# Patient Record
Sex: FEMALE | Race: BLACK OR AFRICAN AMERICAN | NOT HISPANIC OR LATINO | Employment: UNEMPLOYED | URBAN - METROPOLITAN AREA
[De-identification: names, ages, dates, MRNs, and addresses within clinical notes are randomized per-mention and may not be internally consistent; named-entity substitution may affect disease eponyms.]

---

## 2017-01-04 ENCOUNTER — DOCUMENTATION (OUTPATIENT)
Dept: BARIATRICS/WEIGHT MGMT | Facility: HOSPITAL | Age: 37
End: 2017-01-04

## 2017-01-29 ENCOUNTER — APPOINTMENT (OUTPATIENT)
Dept: PREADMISSION TESTING | Facility: HOSPITAL | Age: 37
End: 2017-01-29

## 2017-01-29 LAB
DEPRECATED RDW RBC AUTO: 39.7 FL (ref 37–54)
ERYTHROCYTE [DISTWIDTH] IN BLOOD BY AUTOMATED COUNT: 12.8 % (ref 11.3–14.5)
HBA1C MFR BLD: 8.8 % (ref 4.8–5.6)
HCT VFR BLD AUTO: 43.9 % (ref 34.5–44)
HGB BLD-MCNC: 15.1 G/DL (ref 11.5–15.5)
MCH RBC QN AUTO: 29.4 PG (ref 27–31)
MCHC RBC AUTO-ENTMCNC: 34.4 G/DL (ref 32–36)
MCV RBC AUTO: 85.4 FL (ref 80–99)
PLATELET # BLD AUTO: 207 10*3/MM3 (ref 150–450)
PMV BLD AUTO: 9.9 FL (ref 6–12)
RBC # BLD AUTO: 5.14 10*6/MM3 (ref 3.89–5.14)
WBC NRBC COR # BLD: 6.52 10*3/MM3 (ref 3.5–10.8)

## 2017-01-29 PROCEDURE — 36415 COLL VENOUS BLD VENIPUNCTURE: CPT

## 2017-01-29 PROCEDURE — 93005 ELECTROCARDIOGRAM TRACING: CPT

## 2017-01-29 PROCEDURE — 93010 ELECTROCARDIOGRAM REPORT: CPT | Performed by: INTERNAL MEDICINE

## 2017-01-29 PROCEDURE — 83036 HEMOGLOBIN GLYCOSYLATED A1C: CPT | Performed by: ANESTHESIOLOGY

## 2017-01-29 PROCEDURE — 85027 COMPLETE CBC AUTOMATED: CPT | Performed by: ANESTHESIOLOGY

## 2017-01-29 RX ORDER — MELOXICAM 7.5 MG/1
7.5 TABLET ORAL AS NEEDED
COMMUNITY
Start: 2013-03-15 | End: 2018-07-21 | Stop reason: HOSPADM

## 2017-01-29 RX ORDER — TIZANIDINE 4 MG/1
TABLET ORAL
Refills: 5 | COMMUNITY
Start: 2016-11-16 | End: 2017-10-10

## 2017-01-29 RX ORDER — PRAVASTATIN SODIUM 40 MG
TABLET ORAL
Refills: 5 | COMMUNITY
Start: 2016-11-06 | End: 2017-01-29

## 2017-01-29 RX ORDER — FLUOXETINE HYDROCHLORIDE 20 MG/1
3 CAPSULE ORAL DAILY
COMMUNITY
Start: 2013-03-15 | End: 2017-01-29

## 2017-01-29 RX ORDER — IBUPROFEN 800 MG/1
800 TABLET ORAL AS NEEDED
COMMUNITY
Start: 2016-12-27 | End: 2018-07-21 | Stop reason: HOSPADM

## 2017-01-29 RX ORDER — HYDROXYZINE HYDROCHLORIDE 25 MG/1
TABLET, FILM COATED ORAL
COMMUNITY
Start: 2013-03-15 | End: 2017-01-29

## 2017-01-29 RX ORDER — BUSPIRONE HYDROCHLORIDE 15 MG/1
TABLET ORAL 2 TIMES DAILY
COMMUNITY
Start: 2013-03-15 | End: 2017-01-29

## 2017-01-29 RX ORDER — BLOOD-GLUCOSE METER
KIT MISCELLANEOUS
COMMUNITY
Start: 2016-12-17 | End: 2017-01-29

## 2017-01-29 RX ORDER — WARFARIN SODIUM 7.5 MG/1
TABLET ORAL
COMMUNITY
Start: 2013-03-15 | End: 2017-01-29

## 2017-01-29 RX ORDER — TRAZODONE HYDROCHLORIDE 50 MG/1
50 TABLET ORAL
COMMUNITY
Start: 2013-03-15 | End: 2017-01-29

## 2017-02-01 ENCOUNTER — ANESTHESIA EVENT (OUTPATIENT)
Dept: GASTROENTEROLOGY | Facility: HOSPITAL | Age: 37
End: 2017-02-01

## 2017-02-01 RX ORDER — FAMOTIDINE 20 MG/1
20 TABLET, FILM COATED ORAL ONCE
Status: CANCELLED | OUTPATIENT
Start: 2017-02-01 | End: 2017-02-01

## 2017-02-01 RX ORDER — LIDOCAINE HYDROCHLORIDE 10 MG/ML
1 INJECTION, SOLUTION EPIDURAL; INFILTRATION; INTRACAUDAL; PERINEURAL ONCE
Status: CANCELLED | OUTPATIENT
Start: 2017-02-01 | End: 2017-02-01

## 2017-02-01 RX ORDER — SODIUM CHLORIDE 0.9 % (FLUSH) 0.9 %
1-10 SYRINGE (ML) INJECTION AS NEEDED
Status: CANCELLED | OUTPATIENT
Start: 2017-02-01

## 2017-02-01 RX ORDER — SODIUM CHLORIDE, SODIUM LACTATE, POTASSIUM CHLORIDE, CALCIUM CHLORIDE 600; 310; 30; 20 MG/100ML; MG/100ML; MG/100ML; MG/100ML
9 INJECTION, SOLUTION INTRAVENOUS CONTINUOUS
Status: CANCELLED | OUTPATIENT
Start: 2017-02-01

## 2017-02-02 ENCOUNTER — HOSPITAL ENCOUNTER (OUTPATIENT)
Facility: HOSPITAL | Age: 37
Setting detail: HOSPITAL OUTPATIENT SURGERY
Discharge: HOME | End: 2017-02-02
Attending: SURGERY | Admitting: SURGERY

## 2017-02-02 ENCOUNTER — ANESTHESIA (OUTPATIENT)
Dept: GASTROENTEROLOGY | Facility: HOSPITAL | Age: 37
End: 2017-02-02

## 2017-02-02 VITALS
TEMPERATURE: 98 F | OXYGEN SATURATION: 98 % | SYSTOLIC BLOOD PRESSURE: 110 MMHG | HEART RATE: 93 BPM | DIASTOLIC BLOOD PRESSURE: 59 MMHG | RESPIRATION RATE: 18 BRPM

## 2017-02-02 DIAGNOSIS — K21.9 GASTROESOPHAGEAL REFLUX DISEASE, ESOPHAGITIS PRESENCE NOT SPECIFIED: ICD-10-CM

## 2017-02-02 LAB
B-HCG UR QL: NEGATIVE
GLUCOSE BLDC GLUCOMTR-MCNC: 230 MG/DL (ref 70–130)
GLUCOSE BLDC GLUCOMTR-MCNC: NORMAL MG/DL (ref 70–130)
INTERNAL NEGATIVE CONTROL: NORMAL
INTERNAL POSITIVE CONTROL: REACTIVE
Lab: NORMAL

## 2017-02-02 PROCEDURE — 88305 TISSUE EXAM BY PATHOLOGIST: CPT | Performed by: SURGERY

## 2017-02-02 PROCEDURE — 25010000002 PROPOFOL 10 MG/ML EMULSION: Performed by: NURSE ANESTHETIST, CERTIFIED REGISTERED

## 2017-02-02 PROCEDURE — 43239 EGD BIOPSY SINGLE/MULTIPLE: CPT | Performed by: SURGERY

## 2017-02-02 PROCEDURE — 82962 GLUCOSE BLOOD TEST: CPT

## 2017-02-02 RX ORDER — FAMOTIDINE 10 MG/ML
20 INJECTION, SOLUTION INTRAVENOUS ONCE
Status: COMPLETED | OUTPATIENT
Start: 2017-02-02 | End: 2017-02-02

## 2017-02-02 RX ORDER — NICOTINE 21 MG/24HR
1 PATCH, TRANSDERMAL 24 HOURS TRANSDERMAL EVERY 24 HOURS
COMMUNITY
End: 2018-04-06

## 2017-02-02 RX ORDER — SODIUM CHLORIDE 0.9 % (FLUSH) 0.9 %
1-10 SYRINGE (ML) INJECTION AS NEEDED
Status: DISCONTINUED | OUTPATIENT
Start: 2017-02-02 | End: 2017-02-02 | Stop reason: HOSPADM

## 2017-02-02 RX ORDER — PROPOFOL 10 MG/ML
VIAL (ML) INTRAVENOUS AS NEEDED
Status: DISCONTINUED | OUTPATIENT
Start: 2017-02-02 | End: 2017-02-02 | Stop reason: SURG

## 2017-02-02 RX ORDER — LIDOCAINE HYDROCHLORIDE 10 MG/ML
INJECTION, SOLUTION INFILTRATION; PERINEURAL AS NEEDED
Status: DISCONTINUED | OUTPATIENT
Start: 2017-02-02 | End: 2017-02-02 | Stop reason: SURG

## 2017-02-02 RX ORDER — SODIUM CHLORIDE 9 MG/ML
150 INJECTION, SOLUTION INTRAVENOUS CONTINUOUS
Status: DISCONTINUED | OUTPATIENT
Start: 2017-02-02 | End: 2017-02-02 | Stop reason: HOSPADM

## 2017-02-02 RX ADMIN — FAMOTIDINE 20 MG: 10 INJECTION, SOLUTION INTRAVENOUS at 08:12

## 2017-02-02 RX ADMIN — SODIUM CHLORIDE: 9 INJECTION, SOLUTION INTRAVENOUS at 10:17

## 2017-02-02 RX ADMIN — PROPOFOL 250 MG: 10 INJECTION, EMULSION INTRAVENOUS at 10:26

## 2017-02-02 RX ADMIN — LIDOCAINE HYDROCHLORIDE 100 MG: 10 INJECTION, SOLUTION INFILTRATION; PERINEURAL at 10:20

## 2017-02-02 RX ADMIN — SODIUM CHLORIDE 150 ML/HR: 9 INJECTION, SOLUTION INTRAVENOUS at 08:13

## 2017-02-02 NOTE — PLAN OF CARE
Problem: GI Endoscopy (Adult)  Goal: Signs and Symptoms of Listed Potential Problems Will be Absent or Manageable (GI Endoscopy)  Outcome: Outcome(s) achieved Date Met:  02/02/17

## 2017-02-02 NOTE — BRIEF OP NOTE
ESOPHAGOGASTRODUODENOSCOPY  Procedure Note    Carlos Heller  2/2/2017    Pre-op Diagnosis:   Gastroesophageal reflux disease, esophagitis presence not specified [K21.9]    Post-op Diagnosis:     Post-Op Diagnosis Codes:     * Gastroesophageal reflux disease, esophagitis presence not specified [K21.9]    Procedure/CPT® Codes:  CT EGD TRANSORAL BIOPSY SINGLE/MULTIPLE [82396]    Procedure(s):  ESOPHAGOGASTRODUODENOSCOPY    Surgeon(s):  Jean Cardozo MD    Anesthesia: Monitor Anesthesia Care    Staff:   Circulator: Windy Da Silva RN  Endo Technician: Arlene Jones  Endo Nurse: Latia Encarnacion RN    Estimated Blood Loss: * No values recorded between 2/2/2017 10:16 AM and 2/2/2017 10:25 AM *    Specimens:                  ID Type Source Tests Collected by Time Destination   A : Antrum bx. Tissue Stomach TISSUE EXAM Jean Cardozo MD 2/2/2017 1023    B : Dist. Esoph. Bx. Tissue Esophagus, Distal TISSUE EXAM Jean Cardozo MD 2/2/2017 1023          Drains:           Findings:       Complications: none      Jean Cardozo MD     Date: 2/2/2017  Time: 10:25 AM

## 2017-02-02 NOTE — H&P
Chief Complaint: GERD, pursuing LSG        History of Present Illness: Carlos Heller is a 36 y.o. female who presents today for evaluation, education and consultation regarding weight loss surgery. The patient is interested in sleeve gastrectomy       Carlos has been overweight for at least 15 years, has been 35 pounds or more overweight for at least 15 years, has been 100 pounds or more overweight for 15 or more years and started dieting at age teenager. The most weight Carlos lost was 30 pounds and maintained the weight loss for 2 months.      Unsupervised Diet Attempts: Cabbage Soup      Supervised Diet Attempts: None      Over-the-Counter or Prescribed Medications for Weight Loss: Wellbutrin, Dexatrim and Prozac             Medical History              Past Medical History   Diagnosis Date   • Carpal tunnel syndrome            on Neurontin   • Fatigue      • GERD (gastroesophageal reflux disease)      • Hx MRSA infection          on (L) leg, treated w/ I&D, IV abx.   • Hyperlipidemia      • Joint pain      • Morbid obesity      • Pulmonary embolism          unknown etiology, tx w/ coumadin   • Sleep apnea            suspected, not yet tested   • Tobacco use      • Type 2 diabetes mellitus            dx , follows w/ Endocrinology, no insulin             Surgical History                Past Surgical History   Procedure Laterality Date   • Abdominal hysterectomy             d/t postpartum bleeding   • Bilateral breast reduction       • Carpal tunnel release Left    •  section    ,    • Laparoscopic salpingoopherectomy             for tubal pregnancy   • Other surgical history               denies anesthesia issues           Allergies no known allergies      Current Outpatient Prescriptions:   • gabapentin (NEURONTIN) 300 MG capsule, Take 300 mg by mouth Daily., Disp: , Rfl:   • glipiZIDE (GLUCOTROL) 10 MG tablet, Take 10 mg by mouth 2 (Two) Times a Day Before  Meals., Disp: , Rfl:   • linagliptin (TRADJENTA) 5 MG tablet tablet, Take 5 mg by mouth Daily., Disp: , Rfl:   • omeprazole (priLOSEC) 20 MG capsule, Take 20 mg by mouth Daily., Disp: , Rfl:   • pravastatin (PRAVACHOL) 10 MG tablet, Take 10 mg by mouth Daily., Disp: , Rfl:     Social History    Social History                 Social History   • Marital status: Unknown         Spouse name: N/A   • Number of children: N/A   • Years of education: N/A               Occupational History   • Caregiver w/ [employer]                    Social History Main Topics   • Smoking status: Current Every Day Smoker         Packs/day: 0.50         Years: 20.00         Types: Cigarettes   • Smokeless tobacco: Never Used   • Alcohol use No   • Drug use: No   • Sexual activity: Not on file            Other Topics Concern   • Not on file             Social History Narrative      Lives in [place], [place] w/ 3 children.                    Family History   Problem Relation Age of Onset   • Diabetes Mother 35   • Hypertension Brother      • Sleep apnea Brother                  Review of Systems:  Constitutional: The patient reports fatigue, weight gain and denies fevers and chills.  Cardiovascular: The patient reports HLD and denies HTN, heart disease and DVT.  Respiratory: The patient reports apnea and PE.  Gastrointestinal: The patient reports heartburn and denies liver disease.  Genitourinary: The patient denies renal insufficiency.   Musculoskeletal: The patient reports joint pain and denies fibromyalgia and autoimmune disease.  Neurological: The patient denies seizure and stroke.  Psychiatric: The patient reports anxiety and denies depression and bipolar disorder.  Endocrine: The patient reports diabetes and denies thyroid disease.  Hematologic: The patient denies anemia and bleeding disorder.  Skin: The patient reports MRSA.      Physical Exam:  Vital Signs:  Weight: (!) 321 lb 8 oz (146 kg)   Body mass index is 50.35 kg/(m^2).  Temp:  99 °F (37.2 °C)   Heart Rate: 79   BP: 137/88       Physical Exam   Constitutional: She is oriented to person, place, and time. She appears well-developed and well-nourished.   HENT:   Head: Normocephalic and atraumatic.   Eyes: No scleral icterus.   Neck: Neck supple.   Cardiovascular: Normal rate and regular rhythm.   No murmur heard.  Pulmonary/Chest: Breath sounds normal. No respiratory distress. She has no wheezes. She has no rales.   Abdominal: Soft. Bowel sounds are normal. She exhibits no distension and no mass. There is no tenderness. No hernia.   Scars: Periumbilical, pfannenstiel   Musculoskeletal: Normal range of motion.   Neurological: She is alert and oriented to person, place, and time.   Skin: Skin is warm and dry.   Psychiatric: She has a normal mood and affect.   Vitals reviewed.                    Patient Active Problem List   Diagnosis   • Type 2 diabetes mellitus   • Tobacco use   • Sleep apnea   • Pulmonary embolism   • Morbid obesity   • Joint pain   • Hyperlipidemia   • Hx MRSA infection   • GERD (gastroesophageal reflux disease)   • Fatigue           Assessment:      Adolfomagaly Heller is a 36 y.o. year old female with medically complicated obesity pursuing sleeve gastrectomy.      Weight loss surgery is deemed medically necessary given the following obesity related comorbidities including sleep disturbance possible apnea, GERD, diabetes, hyperlipidemia, and joint pain with current Weight: (!) 321 lb 8 oz (146 kg) and Body mass index is 50.35 kg/(m^2)..          Plan:  EGD for further eval of GERD.          NORA Rangel for Dr. Cas CHIRINOS

## 2017-02-02 NOTE — OP NOTE
DATE OF PROCEDURE:  02/02/2017    PREOPERATIVE DIAGNOSIS:  Gastroesophageal reflux disease.      POSTOPERATIVE DIAGNOSIS: Gastroesophageal reflux disease.     PROCEDURE PERFORMED: Esophagogastroduodenoscopy with biopsy x two.     SURGEON: Jean Cardozo MD     ANESTHESIA: MAC.     SPECIMENS: Antrum and distal esophageal biopsies.     COMPLICATIONS: None.     FINDINGS: Fairly unremarkable exam. Z-line 40 cm. No obvious hiatal hernia.     INDICATIONS: This is a 36-year-old super morbidly obese black female interested in laparoscopic sleeve gastrectomy.  Both she and her  are well aware I do not offer sleeve gastrectomy to anyone who cannot be tobacco and secondhand smoke-free for at least 2 weeks preoperatively and 6 weeks postoperatively as the risk of leak is prohibitive. They voiced understanding and still wished to proceed. She has a long-standing history of severe GE reflux disease, not controlled with proton pump inhibitors. She also takes TUMS on a regular basis without much relief. No prior upper GI evaluation. No dysphagia. Please see our office notes. Risks, benefits and alternative therapies were discussed and she wished to proceed with diagnostic possible therapeutic EGD.     DESCRIPTION OF PROCEDURE:  The patient was brought to the endoscopy suite and placed supine upon the stretcher. A bite-block was placed. She was sedated by the anesthesiology staff and a standard flexible endoscope was advanced under direct visualization into the posterior pharynx. The vocal cords appeared normal. The esophagus was intubated. The endoscope advanced easily through the esophagus and no strictures, no retained secretions and no tertiary spasms. On reaching the distal esophagus, no visible hiatal hernia, Chaidez's esophagus or gross esophagitis. The gastric cavity was entered. The gastric mucosa appeared grossly normal. The pylorus was readily identified. It was not deformed. It was not in spasm. It was  easily intubated, and the endoscope advanced through the 1st and 2nd portions of the duodenum.  Duodenal mucosa was unremarkable. The endoscope was withdrawn to the antrum and a biopsy was obtained. There was some prepyloric antritis. The patient does take anti-inflammatories. Retroflexed examination revealed no significant abnormalities of the cardia, body or fundus. No visible hiatal hernia from the retroflex view. The endoscope was withdrawn to the Z line. It was 40 cm from the incisors. Photo documentation of the area and a biopsy above the Z line was obtained. The endoscope was slowly withdrawn. The remainder of examination showed no new abnormalities. The patient tolerated the procedure well without complication and was taken to the recovery room in stable condition.           MD LILLIAM Perez/leroy  DD: 02/02/2017 10:31:38  DT: 02/02/2017 12:28:52  Voice Rec. ID #[id number]  Voice Original ID #[id number]  Doc ID #[id number]  Rev. #0  cc:

## 2017-02-02 NOTE — ANESTHESIA POSTPROCEDURE EVALUATION
Patient: Carlos Heller    Procedure Summary     Date Anesthesia Start Anesthesia Stop Room / [facility]    02/02/17 1017 1032  [facility]       Procedure Diagnosis Surgeon Provider    ESOPHAGOGASTRODUODENOSCOPY (N/A Esophagus) Gastroesophageal reflux disease, esophagitis presence not specified  (Gastroesophageal reflux disease, esophagitis presence not specified [K21.9]) MD Anastacio Perez MD          Anesthesia Type: general  Last vitals  /59 (02/02/17 1030)    Temp 98 °F (36.7 °C) (02/02/17 1030)    Pulse 93 (02/02/17 1030)   Resp 18 (02/02/17 1030)    SpO2 98 % (02/02/17 1030)      Post Anesthesia Care and Evaluation    Patient location during evaluation: PACU  Patient participation: complete - patient participated  Level of consciousness: awake and alert  Pain score: 0  Pain management: adequate  Airway patency: patent  Anesthetic complications: No anesthetic complications  PONV Status: none  Cardiovascular status: hemodynamically stable and acceptable  Respiratory status: nonlabored ventilation, acceptable and nasal cannula  Hydration status: acceptable

## 2017-02-02 NOTE — ANESTHESIA PREPROCEDURE EVALUATION
Anesthesia Evaluation      Airway   Mallampati: I  TM distance: >3 FB  Neck ROM: full  no difficulty expected  Dental      Pulmonary    (+) pulmonary embolism, sleep apnea,   Cardiovascular     Rhythm: regular  Rate: normal    Neuro/Psych  GI/Hepatic/Renal/Endo    (+) morbid obesity, GERD, diabetes mellitus,     Musculoskeletal     Abdominal    Substance History      OB/GYN          Other                             Anesthesia Plan    ASA 3     general     intravenous induction   Anesthetic plan and risks discussed with patient.    Plan discussed with CRNA.

## 2017-02-03 LAB
CYTO UR: NORMAL
LAB AP CASE REPORT: NORMAL
LAB AP CLINICAL INFORMATION: NORMAL
Lab: NORMAL
PATH REPORT.FINAL DX SPEC: NORMAL
PATH REPORT.GROSS SPEC: NORMAL

## 2017-10-10 ENCOUNTER — OFFICE VISIT (OUTPATIENT)
Dept: RETAIL CLINIC | Facility: CLINIC | Age: 37
End: 2017-10-10

## 2017-10-10 VITALS — RESPIRATION RATE: 20 BRPM | TEMPERATURE: 98.1 F | HEART RATE: 78 BPM | OXYGEN SATURATION: 99 %

## 2017-10-10 DIAGNOSIS — M54.50 ACUTE LEFT-SIDED LOW BACK PAIN WITHOUT SCIATICA: ICD-10-CM

## 2017-10-10 DIAGNOSIS — S39.012A STRAIN OF LUMBAR REGION, INITIAL ENCOUNTER: Primary | ICD-10-CM

## 2017-10-10 LAB
BILIRUB BLD-MCNC: NEGATIVE MG/DL
CLARITY, POC: CLEAR
COLOR UR: YELLOW
GLUCOSE UR STRIP-MCNC: ABNORMAL MG/DL
KETONES UR QL: NEGATIVE
LEUKOCYTE EST, POC: NEGATIVE
NITRITE UR-MCNC: NEGATIVE MG/ML
PH UR: 5.5 [PH] (ref 5–8)
PROT UR STRIP-MCNC: NEGATIVE MG/DL
RBC # UR STRIP: NEGATIVE /UL
SP GR UR: 1.02 (ref 1–1.03)
UROBILINOGEN UR QL: NORMAL

## 2017-10-10 PROCEDURE — 99213 OFFICE O/P EST LOW 20 MIN: CPT | Performed by: NURSE PRACTITIONER

## 2017-10-10 PROCEDURE — 81003 URINALYSIS AUTO W/O SCOPE: CPT | Performed by: NURSE PRACTITIONER

## 2017-10-10 RX ORDER — CYCLOBENZAPRINE HCL 10 MG
10 TABLET ORAL 3 TIMES DAILY PRN
Qty: 20 TABLET | Refills: 0 | Status: SHIPPED | OUTPATIENT
Start: 2017-10-10 | End: 2018-04-06

## 2017-10-10 NOTE — PATIENT INSTRUCTIONS
Back Pain, Adult  Back pain is very common in adults. The cause of back pain is rarely dangerous and the pain often gets better over time. The cause of your back pain may not be known. Some common causes of back pain include:  · Strain of the muscles or ligaments supporting the spine.  · Wear and tear (degeneration) of the spinal disks.  · Arthritis.  · Direct injury to the back.  For many people, back pain may return. Since back pain is rarely dangerous, most people can learn to manage this condition on their own.  HOME CARE INSTRUCTIONS  Watch your back pain for any changes. The following actions may help to lessen any discomfort you are feeling:  · Remain active. It is stressful on your back to sit or  one place for long periods of time. Do not sit, drive, or  one place for more than 30 minutes at a time. Take short walks on even surfaces as soon as you are able. Try to increase the length of time you walk each day.  · Exercise regularly as directed by your health care provider. Exercise helps your back heal faster. It also helps avoid future injury by keeping your muscles strong and flexible.  · Do not stay in bed. Resting more than 1-2 days can delay your recovery.  · Pay attention to your body when you bend and lift. The most comfortable positions are those that put less stress on your recovering back. Always use proper lifting techniques, including:    Bending your knees.    Keeping the load close to your body.    Avoiding twisting.  · Find a comfortable position to sleep. Use a firm mattress and lie on your side with your knees slightly bent. If you lie on your back, put a pillow under your knees.  · Avoid feeling anxious or stressed. Stress increases muscle tension and can worsen back pain. It is important to recognize when you are anxious or stressed and learn ways to manage it, such as with exercise.  · Take medicines only as directed by your health care provider. Over-the-counter  medicines to reduce pain and inflammation are often the most helpful. Your health care provider may prescribe muscle relaxant drugs. These medicines help dull your pain so you can more quickly return to your normal activities and healthy exercise.  · Apply ice to the injured area:    Put ice in a plastic bag.    Place a towel between your skin and the bag.    Leave the ice on for 20 minutes, 2-3 times a day for the first 2-3 days. After that, ice and heat may be alternated to reduce pain and spasms.  · Maintain a healthy weight. Excess weight puts extra stress on your back and makes it difficult to maintain good posture.  SEEK MEDICAL CARE IF:  · You have pain that is not relieved with rest or medicine.  · You have increasing pain going down into the legs or buttocks.  · You have pain that does not improve in one week.  · You have night pain.  · You lose weight.  · You have a fever or chills.  SEEK IMMEDIATE MEDICAL CARE IF:   · You develop new bowel or bladder control problems.  · You have unusual weakness or numbness in your arms or legs.  · You develop nausea or vomiting.  · You develop abdominal pain.  · You feel faint.     This information is not intended to replace advice given to you by your health care provider. Make sure you discuss any questions you have with your health care provider.     Document Released: 12/18/2006 Document Revised: 01/08/2016 Document Reviewed: 04/21/2015  [employer] Interactive Patient Education ©2017 [employer]    Back Injury Prevention  Back injuries can be very painful. They can also be difficult to heal. After having one back injury, you are more likely to injure your back again. It is important to learn how to avoid injuring or re-injuring your back. The following tips can help you to prevent a back injury.  WHAT SHOULD I KNOW ABOUT PHYSICAL FITNESS?  · Exercise for 30 minutes per day on most days of the week or as directed by your health care provider. Make sure to:    Do  "aerobic exercises, such as walking, jogging, biking, or swimming.    Do exercises that increase balance and strength, such as lucille chi and yoga. These can decrease your risk of falling and injuring your back.    Do stretching exercises to help with flexibility.    Try to develop strong abdominal muscles. Your abdominal muscles provide a lot of the support that is needed by your back.  · Maintain a healthy weight.  This helps to decrease your risk of a back injury.  WHAT SHOULD I KNOW ABOUT MY DIET?  · Talk with your health care provider about your overall diet. Take supplements and vitamins only as directed by your health care provider.  · Talk with your health care provider about how much calcium and vitamin D you need each day. These nutrients help to prevent weakening of the bones (osteoporosis). Osteoporosis can cause broken (fractured) bones, which lead to back pain.  · Include good sources of calcium in your diet, such as dairy products, green leafy vegetables, and products that have had calcium added to them (fortified).  · Include good sources of vitamin D in your diet, such as milk and foods that are fortified with vitamin D.  WHAT SHOULD I KNOW ABOUT MY POSTURE?  · Sit up straight and stand up straight. Avoid leaning forward when you sit or hunching over when you stand.  · Choose chairs that have good low-back (lumbar) support.  · If you work at a desk, sit close to it so you do not need to lean over. Keep your chin tucked in. Keep your neck drawn back, and keep your elbows bent at a right angle. Your arms should look like the letter \"L.\"  · Sit high and close to the steering wheel when you drive. Add a lumbar support to your car seat, if needed.  · Avoid sitting or standing in one position for very long. Take breaks to get up, stretch, and walk around at least one time every hour. Take breaks every hour if you are driving for long periods of time.  · Sleep on your side with your knees slightly bent, or " sleep on your back with a pillow under your knees. Do not lie on the front of your body to sleep.  WHAT SHOULD I KNOW ABOUT LIFTING, TWISTING, AND REACHING?  Lifting and Heavy Lifting  · Avoid heavy lifting, especially repetitive heavy lifting. If you must do heavy lifting:    Stretch before lifting.    Work slowly.    Rest between lifts.    Use a tool such as a cart or a odessa to move objects if one is available.    Make several small trips instead of carrying one heavy load.    Ask for help when you need it, especially when moving big objects.  · Follow these steps when lifting:    Stand with your feet shoulder-width apart.    Get as close to the object as you can. Do not try to  a heavy object that is far from your body.    Use handles or lifting straps if they are available.    Bend at your knees. Squat down, but keep your heels off the floor.    Keep your shoulders pulled back, your chin tucked in, and your back straight.    Lift the object slowly while you tighten the muscles in your legs, abdomen, and buttocks. Keep the object as close to the center of your body as possible.  · Follow these steps when putting down a heavy load:    Stand with your feet shoulder-width apart.    Lower the object slowly while you tighten the muscles in your legs, abdomen, and buttocks. Keep the object as close to the center of your body as possible.    Keep your shoulders pulled back, your chin tucked in, and your back straight.    Bend at your knees. Squat down, but keep your heels off the floor.    Use handles or lifting straps if they are available.  Twisting and Reaching  · Avoid lifting heavy objects above your waist.  · Do not twist at your waist while you are lifting or carrying a load. If you need to turn, move your feet.  · Do not bend over without bending at your knees.  · Avoid reaching over your head, across a table, or for an object on a high surface.  WHAT ARE SOME OTHER TIPS?  · Avoid wet floors and icy  ground. Keep sidewalks clear of ice to prevent falls.  · Do not sleep on a mattress that is too soft or too hard.  · Keep items that are used frequently within easy reach.  · Put heavier objects on shelves at waist level, and put lighter objects on lower or higher shelves.  · Find ways to decrease your stress, such as exercise, massage, or relaxation techniques. Stress can build up in your muscles. Tense muscles are more vulnerable to injury.  · Talk with your health care provider if you feel anxious or depressed. These conditions can make back pain worse.  · Wear flat heel shoes with cushioned soles.  · Avoid sudden movements.  · Use both shoulder straps when carrying a backpack.  · Do not use any tobacco products, including cigarettes, chewing tobacco, or electronic cigarettes. If you need help quitting, ask your health care provider.     This information is not intended to replace advice given to you by your health care provider. Make sure you discuss any questions you have with your health care provider.     Document Released: 01/25/2006 Document Revised: 04/10/2017 Document Reviewed: 12/22/2015  [employer] Patient Education ©2017 [employer]      Continue Ibuprofen every 8 hours.  You may alternate with tylenol in between if needed.  Add flexeril.  Caution due to risk of drowsiness. Follow up with PCP if symptoms persist or worsen.  If you develop other symptoms such as fever,increased pain, blood in urine, etc - you should go to ER.

## 2017-10-10 NOTE — PROGRESS NOTES
"Rodrigo Heller is a 37 y.o. female.   Chief Complaint   Patient presents with   • Back Pain      HPI Comments: Spent the weekend moving heavy furniture/beds, cleaning, etc.   Then 2-3 days ago started having left lower back pain.  Pain worse with movement (worse with bending and twisting of trunk, position change from sitting to laying).   Taking Ibuprofen 800mg every 6-8 hours and does relieve pain.  Pain does not radiate into hip or legs.   Very localized.  Has not had any urinary symptoms, but was worried about possible UTI.   Has hx of recurrent \"skin boils\", and PCP has given her rx for bactrim to have on hand.  Started having skin flare up 4 days ago on legs (better now), so she started bactrim.   No fever or other symptoms.     Back Pain   Pertinent negatives include no abdominal pain, chest pain, dysuria, fever or pelvic pain.        The following portions of the patient's history were reviewed and updated as appropriate: allergies, current medications, past family history, past medical history, past social history, past surgical history and problem list.    Current Outpatient Prescriptions:   •  cyclobenzaprine (FLEXERIL) 10 MG tablet, Take 1 tablet by mouth 3 (Three) Times a Day As Needed for Muscle Spasms., Disp: 20 tablet, Rfl: 0  •  gabapentin (NEURONTIN) 300 MG capsule, Take 600 mg by mouth As Needed (muscle pain)., Disp: , Rfl:   •  ibuprofen (ADVIL,MOTRIN) 800 MG tablet, Take 800 mg by mouth As Needed for mild pain (1-3)., Disp: , Rfl:   •  linagliptin (TRADJENTA) 5 MG tablet tablet, Take 5 mg by mouth Daily., Disp: , Rfl:   •  meloxicam (MOBIC) 7.5 MG tablet, Take 7.5 mg by mouth As Needed., Disp: , Rfl:   •  nicotine (NICODERM CQ) 21 MG/24HR patch, Place 1 patch on the skin Daily., Disp: , Rfl:   •  omeprazole (priLOSEC) 20 MG capsule, Take 20 mg by mouth 2 (Two) Times a Day As Needed (heartburn)., Disp: , Rfl:   •  pravastatin (PRAVACHOL) 10 MG tablet, Take 80 mg by mouth " Daily., Disp: , Rfl:     Review of Systems   Constitutional: Negative for appetite change, chills, fatigue and fever.   HENT: Negative for congestion and sore throat.    Respiratory: Negative for cough, chest tightness and shortness of breath.    Cardiovascular: Negative for chest pain.   Gastrointestinal: Negative for abdominal distention, abdominal pain, constipation, diarrhea, nausea and vomiting.   Endocrine: Negative for polydipsia, polyphagia and polyuria.   Genitourinary: Negative for decreased urine volume, difficulty urinating, dysuria, flank pain, frequency, genital sores, hematuria, pelvic pain, urgency, vaginal bleeding, vaginal discharge and vaginal pain.   Musculoskeletal: Positive for back pain. Negative for gait problem.   Skin: Negative for rash.   Neurological: Negative for dizziness.     Pulse 78  Temp 98.1 °F (36.7 °C)  Resp 20  SpO2 99%    Objective   Allergies   Allergen Reactions   • Influenza Vaccines GI Intolerance       Physical Exam   Constitutional: Vital signs are normal. She is cooperative. She does not appear ill. No distress.   HENT:   Mouth/Throat: Uvula is midline, oropharynx is clear and moist and mucous membranes are normal.   Eyes: Conjunctivae are normal.   Neck: Full passive range of motion without pain.   Cardiovascular: Regular rhythm.    Pulmonary/Chest: Effort normal and breath sounds normal.   Abdominal: Normal appearance and bowel sounds are normal. There is no tenderness.   Musculoskeletal:        Arms:  No CVA tenderness    Also, no current tenderness on exam.  Full ROM.  But, pt reports has pain in indicated area when it is time for Ibuprofen again   Neurological: She is alert.       Assessment/Plan   Carlos was seen today for back pain.    Diagnoses and all orders for this visit:    Strain of lumbar region, initial encounter    Acute left-sided low back pain without sciatica  -     POCT urinalysis dipstick, automated    Other orders  -     cyclobenzaprine  (FLEXERIL) 10 MG tablet; Take 1 tablet by mouth 3 (Three) Times a Day As Needed for Muscle Spasms.

## 2018-03-15 ENCOUNTER — DOCUMENTATION (OUTPATIENT)
Dept: BARIATRICS/WEIGHT MGMT | Facility: CLINIC | Age: 38
End: 2018-03-15

## 2018-03-15 DIAGNOSIS — E11.69 DIABETES MELLITUS TYPE 2 IN OBESE (HCC): ICD-10-CM

## 2018-03-15 DIAGNOSIS — R06.00 DYSPNEA, UNSPECIFIED TYPE: Primary | ICD-10-CM

## 2018-03-15 DIAGNOSIS — R10.13 DYSPEPSIA: ICD-10-CM

## 2018-03-15 DIAGNOSIS — R53.83 FATIGUE, UNSPECIFIED TYPE: ICD-10-CM

## 2018-03-15 DIAGNOSIS — E66.9 DIABETES MELLITUS TYPE 2 IN OBESE (HCC): ICD-10-CM

## 2018-04-05 DIAGNOSIS — E66.9 DIABETES MELLITUS TYPE 2 IN OBESE (HCC): ICD-10-CM

## 2018-04-05 DIAGNOSIS — R10.13 DYSPEPSIA: ICD-10-CM

## 2018-04-05 DIAGNOSIS — R06.00 DYSPNEA, UNSPECIFIED TYPE: ICD-10-CM

## 2018-04-05 DIAGNOSIS — E11.69 DIABETES MELLITUS TYPE 2 IN OBESE (HCC): ICD-10-CM

## 2018-04-05 DIAGNOSIS — R53.83 FATIGUE, UNSPECIFIED TYPE: ICD-10-CM

## 2018-04-06 ENCOUNTER — OFFICE VISIT (OUTPATIENT)
Dept: BARIATRICS/WEIGHT MGMT | Facility: CLINIC | Age: 38
End: 2018-04-06

## 2018-04-06 ENCOUNTER — DOCUMENTATION (OUTPATIENT)
Dept: BARIATRICS/WEIGHT MGMT | Facility: CLINIC | Age: 38
End: 2018-04-06

## 2018-04-06 VITALS
DIASTOLIC BLOOD PRESSURE: 86 MMHG | RESPIRATION RATE: 18 BRPM | WEIGHT: 293 LBS | HEIGHT: 65 IN | HEART RATE: 67 BPM | BODY MASS INDEX: 48.82 KG/M2 | TEMPERATURE: 97.8 F | SYSTOLIC BLOOD PRESSURE: 146 MMHG | OXYGEN SATURATION: 99 %

## 2018-04-06 DIAGNOSIS — M79.7 FIBROMYALGIA: ICD-10-CM

## 2018-04-06 DIAGNOSIS — Z87.891 FORMER SMOKER: ICD-10-CM

## 2018-04-06 DIAGNOSIS — E78.5 HYPERLIPIDEMIA, UNSPECIFIED HYPERLIPIDEMIA TYPE: Primary | ICD-10-CM

## 2018-04-06 DIAGNOSIS — E11.8 TYPE 2 DIABETES MELLITUS WITH COMPLICATION, WITHOUT LONG-TERM CURRENT USE OF INSULIN (HCC): ICD-10-CM

## 2018-04-06 DIAGNOSIS — G56.00 CARPAL TUNNEL SYNDROME, UNSPECIFIED LATERALITY: ICD-10-CM

## 2018-04-06 DIAGNOSIS — F32.A DEPRESSION, UNSPECIFIED DEPRESSION TYPE: ICD-10-CM

## 2018-04-06 DIAGNOSIS — E66.01 MORBID OBESITY (HCC): ICD-10-CM

## 2018-04-06 DIAGNOSIS — G47.33 OSA (OBSTRUCTIVE SLEEP APNEA): ICD-10-CM

## 2018-04-06 DIAGNOSIS — G47.30 SLEEP APNEA, UNSPECIFIED TYPE: ICD-10-CM

## 2018-04-06 DIAGNOSIS — I26.99 OTHER PULMONARY EMBOLISM WITHOUT ACUTE COR PULMONALE, UNSPECIFIED CHRONICITY (HCC): ICD-10-CM

## 2018-04-06 DIAGNOSIS — K21.9 GASTROESOPHAGEAL REFLUX DISEASE, ESOPHAGITIS PRESENCE NOT SPECIFIED: ICD-10-CM

## 2018-04-06 DIAGNOSIS — M25.50 ARTHRALGIA, UNSPECIFIED JOINT: ICD-10-CM

## 2018-04-06 DIAGNOSIS — R53.83 OTHER FATIGUE: ICD-10-CM

## 2018-04-06 PROCEDURE — 99214 OFFICE O/P EST MOD 30 MIN: CPT | Performed by: PHYSICIAN ASSISTANT

## 2018-04-06 RX ORDER — INSULIN GLARGINE 100 [IU]/ML
INJECTION, SOLUTION SUBCUTANEOUS
Refills: 2 | COMMUNITY
Start: 2018-03-16 | End: 2018-07-21 | Stop reason: HOSPADM

## 2018-04-06 RX ORDER — DICLOFENAC SODIUM 75 MG/1
75 TABLET, DELAYED RELEASE ORAL 2 TIMES DAILY
Refills: 1 | COMMUNITY
Start: 2018-02-26 | End: 2018-06-19

## 2018-04-06 RX ORDER — DULOXETIN HYDROCHLORIDE 30 MG/1
30 CAPSULE, DELAYED RELEASE ORAL DAILY
COMMUNITY
Start: 2018-04-04 | End: 2021-08-05

## 2018-04-06 RX ORDER — ERGOCALCIFEROL 1.25 MG/1
CAPSULE ORAL
Refills: 1 | COMMUNITY
Start: 2018-03-20 | End: 2021-08-05

## 2018-04-06 NOTE — PROGRESS NOTES
[facility]  [address]  [facility] [address] [phone]      Patient  Name:  Carlos Heller  :  1980      Date of Visit: 2018      Chief Complaint:  weight gain; unable to maintain weight loss    History of Present Illness:  Carlos Heller is a 37 y.o. female who presents today for evaluation, education and consultation regarding weight loss surgery. The patient is interested in sleeve gastrectomy.     Carlos has been overweight for at least 10 years, has been 35 pounds or more overweight for at least 15 years, has been 100 pounds or more overweight for 15 or more years and started dieting at age 20.      Previous diet attempts include: Low Fat, Cabbage Soup and Slim Fast; None; Wellbutrin.  The most weight Carlos lost was 50 pounds with stopped drinking pop but was only able to maintain that weight loss for 2 months.  Her maximum lifetime weight is 350 pounds.    As above, patient has been overweight for many years, with numerous failed dietary/weight loss attempts.  She now has obesity related comorbidities and as such has decided to pursue weight loss surgery. Pursuing WLS to live a healthier life.  Her cousin has also had WLS with Dr. Cardozo.     Patient has h/o PE in , unknown etiology.   Hospitalized x one week. Treated with warfarin x 6 months. Did not see hematology.  No anticoagulation currently. No DVT since.      She does have chronic joint pain which she feels is related to obesity. Has had corticosteroid injections of knee, last 3/2018. Also with carpal tunnel, steroid injection of hand 2018. Taking daily NSAIDS.  Ibuprofen, Voltaren and mobic all on med list, patient is a little uncertain which she is taking, mobic seemingly a new Rx.     GI history of GERD, not fully controlled on BID PPI, does take tums prn. Patient had EGD apart of bariatric workup 2017 fairly unremarkable. No findings of HH.   Otherwise, no GI complaints of nausea, vomiting, abd pain, dysphagia, bowel issues. Still has GB.     She is a former smoker. 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 She has a couple client's currently smoking.     All past medical, surgical, social and family history have been obtained and discussed as pertinent to bariatric surgery as below.     Past Medical History:   Diagnosis Date   • Carpal tunnel syndrome     NSAIDS daily, also gets steroid injection of right hand, last2018   • Depression    • Fatigue    • Fibromyalgia     patient thinks related to obesity   • Former smoker    • GERD (gastroesophageal reflux disease)     omeprazole BID and tums prn.  EGD with Dr. Cardozo.  No h/o HH or h pylori.    • Hx MRSA infection     on (L) leg, treated w/ I&D, IV abx.      • Hyperlipidemia    • Joint pain     back, shoulder, hips. Recented Rx mobic. Steroid Injection of  L knee 3/2018   • Morbid obesity    • ADDI (obstructive sleep apnea)     semi compliant with CPAP   • Pulmonary embolism     unknown etiology, tx w/ coumadin   • Type 2 diabetes mellitus     dx , follows w/ Endocrinology, on insulin since  checks sugar once a week   • Wears glasses      Past Surgical History:   Procedure Laterality Date   • ABDOMINAL HYSTERECTOMY      d/t postpartum bleeding  with ,  partial    • BILATERAL BREAST REDUCTION     • CARPAL TUNNEL RELEASE Left    •  SECTION  , 2009    x2, horizontal   • ENDOSCOPY N/A 2017    Procedure: ESOPHAGOGASTRODUODENOSCOPY;  Surgeon: Jean Cardozo MD;  [facility]: [facility] ENDOSCOPY;  Service:    • LAPAROSCOPIC SALPINGOOPHERECTOMY      for tubal pregnancy   • OTHER SURGICAL HISTORY      denies anesthesia issues   • WISDOM TOOTH EXTRACTION         Allergies   Allergen Reactions   • Influenza Vaccines GI Intolerance   • Wellbutrin [Bupropion] Palpitations       Current Outpatient Prescriptions:    •  DULoxetine (CYMBALTA) 30 MG capsule, , Disp: , Rfl:   •  ibuprofen (ADVIL,MOTRIN) 800 MG tablet, Take 800 mg by mouth As Needed for mild pain (1-3)., Disp: , Rfl:   •  Insulin Glulisine (APIDRA IJ), Inject  as directed., Disp: , Rfl:   •  LANTUS SOLOSTAR 100 UNIT/ML injection pen, INJECT 40 UNITS EVERY A.M. AND 40 UNITS EVERY BEDTIME, Disp: , Rfl: 2  •  meloxicam (MOBIC) 7.5 MG tablet, Take 7.5 mg by mouth As Needed., Disp: , Rfl:   •  omeprazole (priLOSEC) 20 MG capsule, Take 20 mg by mouth 2 (Two) Times a Day As Needed (heartburn)., Disp: , Rfl:   •  pravastatin (PRAVACHOL) 10 MG tablet, Take 80 mg by mouth Daily., Disp: , Rfl:   •  vitamin D (ERGOCALCIFEROL) 65429 units capsule capsule, TAKE 1 CAPSULE(S) EVERY WEEK BY ORAL ROUTE FOR 30 DAYS., Disp: , Rfl: 1  •  diclofenac (VOLTAREN) 75 MG EC tablet, Take 75 mg by mouth 2 (Two) Times a Day., Disp: , Rfl: 1    Social History     Social History   • Marital status: Single     Spouse name: N/A   • Number of children: 3   • Years of education: GED     Occupational History   • Caregiver w/ [employer]      Social History Main Topics   • Smoking status: Former Smoker     Packs/day: 0.50     Years: 20.00     Types: Cigarettes     Quit date: 01/2017   • Smokeless tobacco: Never Used   • Alcohol use No   • Drug use: No   • Sexual activity: Not on file      Comment: no hormones     Other Topics Concern   • Not on file     Social History Narrative    Lives in [place], [place] w/ 3 children.  Works as caregiver.      Family History   Problem Relation Age of Onset   • Diabetes Mother 35   • Hypertension Brother    • Sleep apnea Brother    • No Known Problems Father    • No Known Problems Sister    • No Known Problems Maternal Grandmother    • No Known Problems Maternal Grandfather    • No Known Problems Paternal Grandmother    • No Known Problems Paternal Grandfather        Review of Systems:  Constitutional:  The patient reports fatigue, weight gain and denies fevers and  chills.  Cardiovascular:  The patient reports HLD, DVT and denies HTN, CP, MI, heart disease and edema.  Respiratory:  The patient reports apnea, PE and denies asthma.  Gastrointestinal:  The patient reports heartburn and denies pancreatitis, liver disease and IBS.  Genitourinary:  The patient denies renal insufficiency.    Musculoskeletal:  The patient reports joint pain, back pain, fibromyalgia, arthritis and denies autoimmune disease.  Neurological:  The patient reports none and denies seizure and stroke.  Psychiatric:  The patient reports depression and denies anxiety and bipolar disorder.  Endocrine:  The patient reports diabetes and denies thyroid disease and gout.  Hematologic:  The patient reports none and denies anemia and bleeding disorder.  Skin:  The patient reports MRSA.    Physical Exam:  Vital Signs:  Weight: (!) 152 kg (335 lb 0.2 oz)   Body mass index is 56.62 kg/m².  Temp: 97.8 °F (36.6 °C)   Heart Rate: 67   BP: 146/86     Physical Exam   Constitutional: She is oriented to person, place, and time. She appears well-developed and well-nourished.   HENT:   Head: Normocephalic and atraumatic.   Mouth/Throat: Oropharynx is clear and moist.   Eyes: EOM are normal.   Neck: Normal range of motion. Neck supple. No thyromegaly present.   Cardiovascular: Normal rate, regular rhythm and normal heart sounds.    Pulmonary/Chest: Effort normal and breath sounds normal. No respiratory distress. She has no wheezes.   Abdominal: Soft. Bowel sounds are normal. She exhibits no distension. There is no tenderness.   Lower lap scars.  Pfannenstiel.    Musculoskeletal: Normal range of motion.   Neurological: She is alert and oriented to person, place, and time.   Skin: Skin is warm and dry.   Psychiatric: She has a normal mood and affect. Her behavior is normal. Judgment and thought content normal.   Vitals reviewed.      Patient Active Problem List   Diagnosis   • Type 2 diabetes mellitus   • Sleep apnea   • Morbid  obesity   • Joint pain   • Hyperlipidemia   • Hx MRSA infection   • GERD (gastroesophageal reflux disease)   • Fatigue   • Former smoker   • Carpal tunnel syndrome   • ADDI (obstructive sleep apnea)   • Pulmonary embolism   • Depression   • Fibromyalgia   • Wears glasses       Assessment:    Carlos Heller is a 37 y.o. year old female with medically complicated obesity pursuing sleeve gastrectomy.    Weight loss surgery is deemed medically necessary given the following obesity related comorbidities including sleep apnea, diabetes, dyslipidemia, osteoarthritis, back pain, knee pain, fibromyalgia, GERD and depression with current Weight: (!) 152 kg (335 lb 0.2 oz) and Body mass index is 56.62 kg/m²..    Plan:  The consultation plan and program requirements were reviewed with the patient.  The patient has been advised that a letter of medical support must be obtained from her primary care physician or referring provider. A psychological evaluation will be arranged.  A nutritional evaluation will be performed.  The patient was advised to start a high protein and low carbohydrate diet.  Necessary lifestyle modifications were discussed.  Instructions on how to access WOODROW was given to the patient.  WOODROW is an internet based educational video that explains the surgical procedure chosen and answers basic questions regarding that procedure.     Preoperative testing will include: CBC, CMP, Fasting Lipids, TSH, HgA1C, H.Pylori, Pulmonary Function Testing, CXR, EKG and EGD. EGD was completed 2/2017 by Dr. Cardozo.    Additional preop clearances required prior to surgery: Cardiac.  Patient already has cardiac clearance.     The patient has been educated on expected postoperative lifestyle changes, including commitment to high protein diet, vitamin regimen, and exercise program.  They are aware that support groups are encouraged for optimal weight loss results. Patient understands that bariatric surgery is not cosmetic  surgery but rather a tool to help make a lifelong commitment to lifestyle changes including diet, exercise, behavior modifications, and healthy habits. The procedure was discussed with the patient and all questions were answered. The importance of avoiding ASA/ NSAIDS/ steroids/ tobacco/ hormones/ immunomodulators perioperatively was discussed. 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         Madeline Adamson PA-C

## 2018-04-06 NOTE — PROGRESS NOTES
"Weight Loss Surgery  Presurgical Nutrition Assessment     Carlos Heller  2018  [id number]  [id number]  1980  female    Surgery desired: Sleeve Gastrectomy    Ht 163.8 cm (64.5 \"); Wt 152 kg (335 #); BMI 56.6  Past Medical History:   Diagnosis Date   • Carpal tunnel syndrome     NSAIDS daily, also gets steroid injection of right hand, last2018   • Depression    • Fatigue    • Fibromyalgia     patient thinks related to obesity   • Former smoker    • GERD (gastroesophageal reflux disease)     omeprazole BID and tums prn.  EGD with Dr. Cardozo.  No h/o HH or h pylori.    • Hx MRSA infection     on (L) leg, treated w/ I&D, IV abx.      • Hyperlipidemia    • Joint pain     back, shoulder, hips. Recented Rx mobic. Steroid Injection of  L knee 3/2018   • Morbid obesity    • ADDI (obstructive sleep apnea)     semi compliant with CPAP   • Pulmonary embolism     unknown etiology, tx w/ coumadin   • Type 2 diabetes mellitus     dx , follows w/ Endocrinology, on insulin since  checks sugar once a week   • Wears glasses      Past Surgical History:   Procedure Laterality Date   • ABDOMINAL HYSTERECTOMY      d/t postpartum bleeding  with ,  partial    • BILATERAL BREAST REDUCTION     • CARPAL TUNNEL RELEASE Left    •  SECTION  , 2009    x2, horizontal   • ENDOSCOPY N/A 2017    Procedure: ESOPHAGOGASTRODUODENOSCOPY;  Surgeon: Jean Cardozo MD;  [facility]: [facility] ENDOSCOPY;  Service:    • LAPAROSCOPIC SALPINGOOPHERECTOMY      for tubal pregnancy   • OTHER SURGICAL HISTORY      denies anesthesia issues   • WISDOM TOOTH EXTRACTION       Allergies   Allergen Reactions   • Influenza Vaccines GI Intolerance   • Wellbutrin [Bupropion] Palpitations       Current Outpatient Prescriptions:   •  diclofenac (VOLTAREN) 75 MG EC tablet, Take 75 mg by mouth 2 (Two) Times a Day., Disp: , Rfl: 1  •  DULoxetine (CYMBALTA) 30 MG capsule, , Disp: , Rfl:   •  ibuprofen " (ADVIL,MOTRIN) 800 MG tablet, Take 800 mg by mouth As Needed for mild pain (1-3)., Disp: , Rfl:   •  Insulin Glulisine (APIDRA IJ), Inject  as directed., Disp: , Rfl:   •  LANTUS SOLOSTAR 100 UNIT/ML injection pen, INJECT 40 UNITS EVERY A.M. AND 40 UNITS EVERY BEDTIME, Disp: , Rfl: 2  •  meloxicam (MOBIC) 7.5 MG tablet, Take 7.5 mg by mouth As Needed., Disp: , Rfl:   •  omeprazole (priLOSEC) 20 MG capsule, Take 20 mg by mouth 2 (Two) Times a Day As Needed (heartburn)., Disp: , Rfl:   •  pravastatin (PRAVACHOL) 10 MG tablet, Take 80 mg by mouth Daily., Disp: , Rfl:   •  vitamin D (ERGOCALCIFEROL) 30897 units capsule capsule, TAKE 1 CAPSULE(S) EVERY WEEK BY ORAL ROUTE FOR 30 DAYS., Disp: , Rfl: 1      Nutrition Assessment    Estimated energy needs:  2210 kcal    Estimated calories for weight loss:  1500 kcal    IBW (Pounds):  145 #        Excess body weight (Pounds):  185 #       Nutrition Recall  24 Hour recall: (B) (L) (D) -  Reviewed and discussed with patient.  Usually has pkg of 6 cheese crax /c a banana for brkfast and 1 cup coffee /c mocha cream.  Excessive intake of processed food:  Hot dog /c bun & 1 slice personal pan pepperoni pizza) at noon for lunch; 3 pm Ham [place] & chips; 8 pm chicken sydnee pasta salad /c Ranch dressing.Four mo ago gave up drinking 3-20 oz bottles of Pepsi, but has substituted Hawaiian punch.         Exercise  never      Education    Provided information packet re:  Sleeve Gastrectomy  1. Reviewed guidelines for higher protein, limited carbohydrate diet to promote weight loss.  Encouraged patient to incorporate these principles of healthy eating from now until approximately 2 weeks prior to bariatric surgery date, when an even lower carbohydrate “liver-shrinking” regimen will be followed. (Information sheet re pre-op diet given).  Explained that after recovery from surgery this diet will again be followed to ensure further loss and for weight maintenance.    2. Encouraged patient  to choose an acceptable protein supplement powder or shake for post-surgery liquid diet.  Provided product guidelines and examples.    3. Explained importance of goal setting to help in changing eating behaviors that are not conducive to weight loss.  Targeted several on a worksheet which also included spaces for patient to work on issues specific to them.  4. Provided follow-up options for support, including contact information for dietitians here, if desired.  Web-based support information and apps for smart phones and computers given.  Noted that monthly support group is offered at this clinic, and that support is associated with successful weight loss.    Recommend that team proceed with surgery and follow per protocol.      Nutrition Goals   Dietary Guidelines per information packet as described above  Protein goal:  grams per day   Carbohydrate goal:  100-140 grams per day  Eliminate soda, sweet tea, etc.     Exercise Goals  Continue current exercise routine   Add 15-30 minutes of activity per day as tolerated      Corie Espinoza, [place]  04/06/2018  3:22 PM

## 2018-04-09 LAB
ALBUMIN SERPL-MCNC: 4.3 G/DL (ref 3.2–4.8)
ALBUMIN/GLOB SERPL: 1.7 G/DL (ref 1.5–2.5)
ALP SERPL-CCNC: 55 U/L (ref 25–100)
ALT SERPL-CCNC: 39 U/L (ref 7–40)
AST SERPL-CCNC: 26 U/L (ref 0–33)
BILIRUB SERPL-MCNC: 1 MG/DL (ref 0.3–1.2)
BUN SERPL-MCNC: 10 MG/DL (ref 9–23)
BUN/CREAT SERPL: 11.1 (ref 7–25)
CALCIUM SERPL-MCNC: 9.5 MG/DL (ref 8.7–10.4)
CHLORIDE SERPL-SCNC: 105 MMOL/L (ref 99–109)
CHOLEST SERPL-MCNC: 193 MG/DL (ref 0–200)
CO2 SERPL-SCNC: 27 MMOL/L (ref 20–31)
CREAT SERPL-MCNC: 0.9 MG/DL (ref 0.6–1.3)
ERYTHROCYTE [DISTWIDTH] IN BLOOD BY AUTOMATED COUNT: 13.4 % (ref 11.3–14.5)
GFR SERPLBLD CREATININE-BSD FMLA CKD-EPI: 71 ML/MIN/1.73
GFR SERPLBLD CREATININE-BSD FMLA CKD-EPI: 86 ML/MIN/1.73
GLOBULIN SER CALC-MCNC: 2.6 GM/DL
GLUCOSE SERPL-MCNC: 145 MG/DL (ref 70–100)
H PYLORI IGA SER-ACNC: <9 UNITS (ref 0–8.9)
H PYLORI IGG SER IA-ACNC: 0.33 INDEX VALUE (ref 0–0.79)
H PYLORI IGM SER-ACNC: <9 UNITS (ref 0–8.9)
HBA1C MFR BLD: 7.8 % (ref 4.8–5.6)
HCT VFR BLD AUTO: 43.8 % (ref 34.5–44)
HDLC SERPL-MCNC: 44 MG/DL (ref 40–60)
HGB BLD-MCNC: 14.5 G/DL (ref 11.5–15.5)
LDLC SERPL CALC-MCNC: 110 MG/DL (ref 0–100)
MCH RBC QN AUTO: 28.7 PG (ref 27–31)
MCHC RBC AUTO-ENTMCNC: 33.1 G/DL (ref 32–36)
MCV RBC AUTO: 86.7 FL (ref 80–99)
PLATELET # BLD AUTO: 244 10*3/MM3 (ref 150–450)
POTASSIUM SERPL-SCNC: 4.2 MMOL/L (ref 3.5–5.5)
PROT SERPL-MCNC: 6.9 G/DL (ref 5.7–8.2)
RBC # BLD AUTO: 5.05 10*6/MM3 (ref 3.89–5.14)
SODIUM SERPL-SCNC: 141 MMOL/L (ref 132–146)
TRIGL SERPL-MCNC: 196 MG/DL (ref 0–150)
TSH SERPL DL<=0.005 MIU/L-ACNC: 1.25 MIU/ML (ref 0.35–5.35)
VLDLC SERPL CALC-MCNC: 39.2 MG/DL
WBC # BLD AUTO: 7.03 10*3/MM3 (ref 3.5–10.8)

## 2018-06-08 DIAGNOSIS — R53.83 FATIGUE, UNSPECIFIED TYPE: ICD-10-CM

## 2018-06-08 DIAGNOSIS — R06.00 DYSPNEA, UNSPECIFIED TYPE: ICD-10-CM

## 2018-06-08 DIAGNOSIS — R06.00 DYSPNEA, UNSPECIFIED TYPE: Primary | ICD-10-CM

## 2018-06-19 ENCOUNTER — CONSULT (OUTPATIENT)
Dept: BARIATRICS/WEIGHT MGMT | Facility: CLINIC | Age: 38
End: 2018-06-19

## 2018-06-19 VITALS
OXYGEN SATURATION: 99 % | WEIGHT: 293 LBS | DIASTOLIC BLOOD PRESSURE: 86 MMHG | BODY MASS INDEX: 48.82 KG/M2 | HEART RATE: 56 BPM | RESPIRATION RATE: 18 BRPM | SYSTOLIC BLOOD PRESSURE: 139 MMHG | TEMPERATURE: 97.2 F | HEIGHT: 65 IN

## 2018-06-19 DIAGNOSIS — E66.01 MORBID OBESITY WITH BODY MASS INDEX (BMI) OF 50.0 TO 59.9 IN ADULT (HCC): Primary | ICD-10-CM

## 2018-06-19 PROCEDURE — 99214 OFFICE O/P EST MOD 30 MIN: CPT | Performed by: SURGERY

## 2018-07-01 RX ORDER — SCOLOPAMINE TRANSDERMAL SYSTEM 1 MG/1
1 PATCH, EXTENDED RELEASE TRANSDERMAL ONCE
Status: CANCELLED | OUTPATIENT
Start: 2018-07-01 | End: 2018-07-01

## 2018-07-01 RX ORDER — ACETAMINOPHEN 325 MG/1
650 TABLET ORAL ONCE
Status: CANCELLED | OUTPATIENT
Start: 2018-07-01 | End: 2018-07-01

## 2018-07-01 RX ORDER — PANTOPRAZOLE SODIUM 40 MG/10ML
40 INJECTION, POWDER, LYOPHILIZED, FOR SOLUTION INTRAVENOUS ONCE
Status: CANCELLED | OUTPATIENT
Start: 2018-07-01

## 2018-07-01 RX ORDER — CHLORHEXIDINE GLUCONATE 0.12 MG/ML
15 RINSE ORAL ONCE
Status: CANCELLED | OUTPATIENT
Start: 2018-07-01

## 2018-07-01 RX ORDER — SODIUM CHLORIDE 9 MG/ML
150 INJECTION, SOLUTION INTRAVENOUS CONTINUOUS
Status: CANCELLED | OUTPATIENT
Start: 2018-07-01

## 2018-07-01 RX ORDER — SODIUM CHLORIDE 0.9 % (FLUSH) 0.9 %
1-10 SYRINGE (ML) INJECTION AS NEEDED
Status: CANCELLED | OUTPATIENT
Start: 2018-07-01

## 2018-07-01 NOTE — PROGRESS NOTES
"[facility] BARIATRIC SURGERY  [address]  [facility] [id number] [phone]      Patient  Name:  Carlos Heller  :  1980      Date of Visit: 18    Chief Complaint:  weight gain; unable to maintain weight loss.  Preop LSG    History of Present Illness:  Carlos Heller is a 37 y.o. female who presents today for evaluation, education and consultation regarding weight loss surgery. Since last seen 18 she has lost 1 lb.  The patient returns for final visit prior to LSG.  Original intake evaluation   reviewed.  Aware at her BMI LSG may be 1st stage proc.  Was going to do this last year but couldn't quit smoking.  I did her cousin's LSG 2 years ago \"she stays on me\"  Says she's \"real thin\".   The patient has had issues with morbid obesity for years and only temporary success with non-surgical methods of weight loss.  The patient is seeking LSG to help with the morbid obesity related conditions of depression, fatigue, fibromyalgia, GERD, HLD, joint pain, ADDI, hx PE, IDDM, elevated ALT, thyrmegaly.  She is exposed to 2nd hand smoke at work but says she will avoid for the requisite period.  Patient voiced understanding that it is my strong recommendation NOT to proceed with LSG if cannot be tobacco and 2nd hand smoke free for at least 2 wks pre and 6 wks postoperatively as the risk of leak is prohibitive.  Also explained that I am aware of one delayed leak where the only risk factor was nicotene and I recommended they avoid this as well for 2 weeks prior and 6 weeks after LSG to minimize the risk of leak.      From 's eval :    Patient has h/o PE in , unknown etiology.   Hospitalized x one week. Treated with warfarin x 6 months. Did not see hematology.  No anticoagulation currently. No DVT since.       She does have chronic joint pain which she feels is related to obesity. Has had corticosteroid injections of knee, last 3/2018. Also with " carpal tunnel, steroid injection of hand 2018. Taking daily NSAIDS.  Ibuprofen, Voltaren and mobic all on med list, patient is a little uncertain which she is taking, mobic seemingly a new Rx.      GI history of GERD, not fully controlled on BID PPI, does take tums prn. Patient had EGD apart of bariatric workup 2017 fairly unremarkable. No findings of HH.  Otherwise, no GI complaints of nausea, vomiting, abd pain, dysphagia, bowel issues. Still has GB.      She is a former smoker. 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 She has a couple client's currently smoking.       Past Medical History:   Diagnosis Date   • Carpal tunnel syndrome     NSAIDS daily, also gets steroid injection of right hand, last2018   • Depression    • Elevated ALT measurement    • Fatigue    • Fibromyalgia     patient thinks related to obesity   • Former smoker    • GERD (gastroesophageal reflux disease)     omeprazole BID and tums prn.  EGD GDW  40 cm, no HH, path sugg IGNACIO. serum h. pyl neg   • Hx MRSA infection     on (L) leg, treated w/ I&D, IV abx.      • Hyperlipidemia    • Joint pain     back, shoulder, hips. Recented Rx mobic. Steroid Injection of  L knee 3/2018   • Morbid obesity    • ADDI (obstructive sleep apnea)     semi compliant with CPAP   • Pulmonary embolism     unknown etiology, tx w/ coumadin   • Type 2 diabetes mellitus     dx , follows w/ Endocrinology, on insulin since  checks sugar once a week   • Wears glasses      Past Surgical History:   Procedure Laterality Date   • ABDOMINAL HYSTERECTOMY      d/t postpartum bleeding  with ,  partial    • BILATERAL BREAST REDUCTION     • CARPAL TUNNEL RELEASE Left    •  SECTION  , 2009    x2, horizontal   • ENDOSCOPY N/A 2017    Procedure: ESOPHAGOGASTRODUODENOSCOPY;  Surgeon: Jean Cardozo MD;  [facility];  Service:    • LAPAROSCOPIC SALPINGOOPHERECTOMY   2000    for tubal pregnancy   • OTHER SURGICAL HISTORY      denies anesthesia issues   • WISDOM TOOTH EXTRACTION  1998       Allergies   Allergen Reactions   • Influenza Vaccines GI Intolerance   • Wellbutrin [Bupropion] Palpitations       Current Outpatient Prescriptions:   •  DULoxetine (CYMBALTA) 30 MG capsule, , Disp: , Rfl:   •  Insulin Glulisine (APIDRA IJ), Inject  as directed., Disp: , Rfl:   •  LANTUS SOLOSTAR 100 UNIT/ML injection pen, INJECT 40 UNITS EVERY A.M. AND 40 UNITS EVERY BEDTIME, Disp: , Rfl: 2  •  omeprazole (priLOSEC) 20 MG capsule, Take 20 mg by mouth 2 (Two) Times a Day As Needed (heartburn)., Disp: , Rfl:   •  pravastatin (PRAVACHOL) 10 MG tablet, Take 80 mg by mouth Daily., Disp: , Rfl:   •  vitamin D (ERGOCALCIFEROL) 28685 units capsule capsule, TAKE 1 CAPSULE(S) EVERY WEEK BY ORAL ROUTE FOR 30 DAYS., Disp: , Rfl: 1  •  ibuprofen (ADVIL,MOTRIN) 800 MG tablet, Take 800 mg by mouth As Needed for mild pain (1-3)., Disp: , Rfl:   •  meloxicam (MOBIC) 7.5 MG tablet, Take 7.5 mg by mouth As Needed., Disp: , Rfl:     Social History     Social History   • Marital status: Single     Spouse name: N/A   • Number of children: 3   • Years of education: GED     Occupational History   • Caregiver w/ [employer]      Social History Main Topics   • Smoking status: Former Smoker     Packs/day: 0.50     Years: 20.00     Types: Cigarettes     Quit date: 01/2017   • Smokeless tobacco: Never Used   • Alcohol use No   • Drug use: No   • Sexual activity: Not on file      Comment: no hormones     Other Topics Concern   • Not on file     Social History Narrative    Lives in [place], [place] w/ 3 children.  Works as caregiver.      Family History   Problem Relation Age of Onset   • Diabetes Mother 35   • Hypertension Brother    • Sleep apnea Brother    • No Known Problems Father    • No Known Problems Sister    • No Known Problems Maternal Grandmother    • No Known Problems Maternal Grandfather    • No Known Problems  Paternal Grandmother    • No Known Problems Paternal Grandfather        Review of Systems   Constitutional: Positive for fatigue. Negative for chills, diaphoresis, fever, unexpected weight gain and unexpected weight loss.   HENT: Negative for congestion and facial swelling.    Eyes: Negative for blurred vision, double vision and discharge.   Respiratory: Negative for chest tightness, shortness of breath and stridor.    Cardiovascular: Negative for chest pain, palpitations and leg swelling.   Gastrointestinal: Negative for blood in stool.   Endocrine: Negative for polydipsia.   Genitourinary: Negative for hematuria.   Musculoskeletal: Positive for arthralgias.   Skin: Negative for color change.   Allergic/Immunologic: Negative for immunocompromised state.   Neurological: Negative for confusion.   Psychiatric/Behavioral: Negative for self-injury.       I have reviewed the ROS and confirm that it's accurate today.    Physical Exam:  Vital Signs:  Weight: (!) 152 kg (334 lb 0.2 oz)   Body mass index is 56.45 kg/m².  Temp: 97.2 °F (36.2 °C)   Heart Rate: 56   BP: 139/86     Physical Exam   Constitutional: She is oriented to person, place, and time. She appears well-developed and well-nourished.   HENT:   Head: Normocephalic and atraumatic.   Nose: Nose normal.   Eyes: Conjunctivae and EOM are normal. Pupils are equal, round, and reactive to light.   Neck: Normal range of motion. Neck supple. Carotid bruit is not present. No tracheal deviation present. Thyromegaly present.   Cardiovascular: Normal rate, regular rhythm and normal heart sounds.    Pulmonary/Chest: Effort normal and breath sounds normal. No respiratory distress.   Abdominal: Soft. She exhibits no distension. There is no hepatosplenomegaly. There is no tenderness.   pfannenstiel   Musculoskeletal: Normal range of motion. She exhibits no edema or deformity.   Neurological: She is alert and oriented to person, place, and time. No cranial nerve deficit.  Coordination normal.   Skin: Skin is warm and dry. No rash noted.   Tattoo left foot   Psychiatric: She has a normal mood and affect. Her behavior is normal. Judgment and thought content normal.   Vitals reviewed.      Patient Active Problem List   Diagnosis   • Type 2 diabetes mellitus   • Sleep apnea   • Morbid obesity   • Joint pain   • Hyperlipidemia   • Hx MRSA infection   • GERD (gastroesophageal reflux disease)   • Fatigue   • Former smoker   • Carpal tunnel syndrome   • ADDI (obstructive sleep apnea)   • Pulmonary embolism   • Depression   • Fibromyalgia   • Wears glasses   Psych Nadine Stroud PhD 1/18, 2/18 approp  [facility]    Assessment:    Carlos Heller is a 37 y.o. year old female with medically complicated obesity.    Weight loss surgery is deemed medically necessary given the following obesity related comorbidities including depression, fatigue, fibromyalgia, GERD, HLD, joint pain, ADDI, hx PE, IDDM, elevated ALT, thyrmegaly with current Weight: (!) 152 kg (334 lb 0.2 oz) and Body mass index is 56.45 kg/m²..    Patient is aware that surgery is a tool, and that weight loss is not guaranteed but only seen in the context of appropriate use, follow up and exercise.    The patient was present for an approximately a 2.5 hour discussion of the purpose of weight loss surgery, how WLS is a tool to assist in achieving weight loss goals, the most common complications and how best to avoid them, and the strategies for short and long term weight loss.  Ample opportunity to discuss questions was available both in group and during the time of individual examination.    I reviewed CBC, CMP, EKG, CXR, EGD, HP, Cardiac Clearance, PFT's, Psych evaluation and RUTHY.  Please see scanned records that I have reviewed and signed off on today.  All of this in addition to the patient's unique history and exam has been taken into consideration in determining their appropriate candidacy for weight loss  "surgery.    Complications  of laparoscopic/possible robotic gastric sleeve were discussed. The patient is well aware of the potential complications of surgery that include but not limited to bleeding, infections, deep venous thrombosis, pulmonary embolism, pulmonary complications such as pneumonia, cardiac events, hernias, small bowel obstruction, damage to the spleen or other organs, bowel injury, disfiguring scars, failure to lose weight, need for additional surgery, conversion to an open procedure, and death. Patient is also aware of complications which apply in this particular procedure that can include but are not limited to a \"leak\" at the staple line which in some instances may require conversion to gastric bypass.    The patient is aware if a hiatal hernia is encountered, it likely will be repaired.  R/B/A Rx to hiatal hernia repair were discussed as outlined in our long consent form.  Briefly risks in addition to those for LSG include recurrent hernia, IGNACIO, dysphagia, esophageal injury, pneumothorax, injury to the vagus nerves, injury to the thoracic duct, aorta or vena cava.    I discussed avoiding all tobacco products and second hand smoke at least 2 weeks pre-operatively and 6 weeks post-operatively to minimize the risk of sleeve leak.  This included discussing the importance of avoiding even secondhand smoke as the risk of leak is increased.  Examples discussed:  I made it very clear that the patient understands they should avoid even riding in a car where someone has previously smoked in the last 2 weeks, living in a house where someone smokes (even if it's in a separate room/patio/attached garage, etc.) we discussed that they should not have a conversation with a group of people who are smoking even if it's outside.  They can be around wood burning fires and barbecue.  I told them I do not know if marijuana has a same effects but my overall recommendation is to avoid it for 2 weeks prior in 6 weeks " after surgery.  They also are aware that nicotine may also increase the risk of leak and I strongly encouraged him to avoid that as well for 2 weeks prior in 6 weeks after surgery.    Discussed the risks, benefits and alternative therapies at great length as outlined in our extensive consent forms, consent videos, and educational teaching process under the direction of the center's .    A copy of the patient's signed informed consent is on file.    R/B/A Rx discussed to postop anticoagulation incl but not limited to bleeding, drug reaction, venothromboembolic events, etc. and agreeable to taking post op  Eliquis 2.5 mg po Q 12 hrs #42      Plan:  Laparoscopic sleeve gastrectomy, poss lap HHR.  Check PCR.  Tobacco concerns as above.  Super MO. IDDM. Semi-compliant with CPAP.  All increase risk.        Jean Cardozo MD

## 2018-07-02 PROBLEM — E66.01 MORBID OBESITY WITH BODY MASS INDEX (BMI) OF 50.0 TO 59.9 IN ADULT: Status: ACTIVE | Noted: 2018-07-02

## 2018-07-17 ENCOUNTER — APPOINTMENT (OUTPATIENT)
Dept: PREADMISSION TESTING | Facility: HOSPITAL | Age: 38
End: 2018-07-17

## 2018-07-17 DIAGNOSIS — E66.01 MORBID OBESITY WITH BODY MASS INDEX (BMI) OF 50.0 TO 59.9 IN ADULT (HCC): ICD-10-CM

## 2018-07-17 LAB
ABO GROUP BLD: NORMAL
BLD GP AB SCN SERPL QL: NEGATIVE
DEPRECATED RDW RBC AUTO: 39.7 FL (ref 37–54)
ERYTHROCYTE [DISTWIDTH] IN BLOOD BY AUTOMATED COUNT: 13.1 % (ref 11.3–14.5)
HBA1C MFR BLD: 8.1 % (ref 4.8–5.6)
HCT VFR BLD AUTO: 42.5 % (ref 34.5–44)
HGB BLD-MCNC: 14.6 G/DL (ref 11.5–15.5)
MCH RBC QN AUTO: 28.7 PG (ref 27–31)
MCHC RBC AUTO-ENTMCNC: 34.4 G/DL (ref 32–36)
MCV RBC AUTO: 83.5 FL (ref 80–99)
MRSA DNA SPEC QL NAA+PROBE: NEGATIVE
PLATELET # BLD AUTO: 234 10*3/MM3 (ref 150–450)
PMV BLD AUTO: 10.3 FL (ref 6–12)
RBC # BLD AUTO: 5.09 10*6/MM3 (ref 3.89–5.14)
RH BLD: POSITIVE
T&S EXPIRATION DATE: NORMAL
WBC NRBC COR # BLD: 6.11 10*3/MM3 (ref 3.5–10.8)

## 2018-07-17 PROCEDURE — 85027 COMPLETE CBC AUTOMATED: CPT | Performed by: ANESTHESIOLOGY

## 2018-07-17 PROCEDURE — 86900 BLOOD TYPING SEROLOGIC ABO: CPT | Performed by: SURGERY

## 2018-07-17 PROCEDURE — 36415 COLL VENOUS BLD VENIPUNCTURE: CPT

## 2018-07-17 PROCEDURE — 87641 MR-STAPH DNA AMP PROBE: CPT | Performed by: SURGERY

## 2018-07-17 PROCEDURE — 86901 BLOOD TYPING SEROLOGIC RH(D): CPT | Performed by: SURGERY

## 2018-07-17 PROCEDURE — 83036 HEMOGLOBIN GLYCOSYLATED A1C: CPT | Performed by: ANESTHESIOLOGY

## 2018-07-17 PROCEDURE — 86850 RBC ANTIBODY SCREEN: CPT | Performed by: SURGERY

## 2018-07-17 RX ORDER — PRAVASTATIN SODIUM 80 MG/1
80 TABLET ORAL DAILY
COMMUNITY
End: 2021-08-05

## 2018-07-17 NOTE — DISCHARGE INSTRUCTIONS

## 2018-07-18 ENCOUNTER — ANESTHESIA (OUTPATIENT)
Dept: PERIOP | Facility: HOSPITAL | Age: 38
End: 2018-07-18

## 2018-07-18 ENCOUNTER — ANESTHESIA EVENT (OUTPATIENT)
Dept: PERIOP | Facility: HOSPITAL | Age: 38
End: 2018-07-18

## 2018-07-18 ENCOUNTER — HOSPITAL ENCOUNTER (INPATIENT)
Facility: HOSPITAL | Age: 38
LOS: 3 days | Discharge: HOME | End: 2018-07-21
Attending: SURGERY | Admitting: SURGERY

## 2018-07-18 DIAGNOSIS — E66.01 MORBID OBESITY WITH BODY MASS INDEX (BMI) OF 50.0 TO 59.9 IN ADULT (HCC): ICD-10-CM

## 2018-07-18 LAB
GLUCOSE BLDC GLUCOMTR-MCNC: 138 MG/DL (ref 70–130)
GLUCOSE BLDC GLUCOMTR-MCNC: 168 MG/DL (ref 70–130)
GLUCOSE BLDC GLUCOMTR-MCNC: 186 MG/DL (ref 70–130)
GLUCOSE BLDC GLUCOMTR-MCNC: 90 MG/DL (ref 70–130)

## 2018-07-18 PROCEDURE — 25010000002 ONDANSETRON PER 1 MG: Performed by: NURSE ANESTHETIST, CERTIFIED REGISTERED

## 2018-07-18 PROCEDURE — 25010000002 DEXAMETHASONE PER 1 MG: Performed by: NURSE ANESTHETIST, CERTIFIED REGISTERED

## 2018-07-18 PROCEDURE — 25010000002 BUPRENORPHINE PER 0.1 MG: Performed by: NURSE ANESTHETIST, CERTIFIED REGISTERED

## 2018-07-18 PROCEDURE — 25010000002 NEOSTIGMINE 10 MG/10ML SOLUTION: Performed by: NURSE ANESTHETIST, CERTIFIED REGISTERED

## 2018-07-18 PROCEDURE — 25010000002 DEXAMETHASONE SODIUM PHOSPHATE 10 MG/ML SOLUTION 1 ML VIAL: Performed by: NURSE ANESTHETIST, CERTIFIED REGISTERED

## 2018-07-18 PROCEDURE — 25010000002 MORPHINE PER 10 MG: Performed by: SURGERY

## 2018-07-18 PROCEDURE — 25010000002 PROMETHAZINE PER 50 MG: Performed by: NURSE ANESTHETIST, CERTIFIED REGISTERED

## 2018-07-18 PROCEDURE — 25010000002 FENTANYL CITRATE (PF) 100 MCG/2ML SOLUTION: Performed by: NURSE ANESTHETIST, CERTIFIED REGISTERED

## 2018-07-18 PROCEDURE — 0DJ08ZZ INSPECTION OF UPPER INTESTINAL TRACT, VIA NATURAL OR ARTIFICIAL OPENING ENDOSCOPIC: ICD-10-PCS | Performed by: SURGERY

## 2018-07-18 PROCEDURE — 25010000002 ONDANSETRON PER 1 MG: Performed by: SURGERY

## 2018-07-18 PROCEDURE — 0DB64Z3 EXCISION OF STOMACH, PERCUTANEOUS ENDOSCOPIC APPROACH, VERTICAL: ICD-10-PCS | Performed by: SURGERY

## 2018-07-18 PROCEDURE — 94799 UNLISTED PULMONARY SVC/PX: CPT

## 2018-07-18 PROCEDURE — 25010000002 PROPOFOL 10 MG/ML EMULSION: Performed by: NURSE ANESTHETIST, CERTIFIED REGISTERED

## 2018-07-18 PROCEDURE — 25010000003 CEFAZOLIN IN DEXTROSE 2-4 GM/100ML-% SOLUTION: Performed by: SURGERY

## 2018-07-18 PROCEDURE — 88307 TISSUE EXAM BY PATHOLOGIST: CPT | Performed by: SURGERY

## 2018-07-18 PROCEDURE — 25010000002 PROMETHAZINE PER 50 MG: Performed by: SURGERY

## 2018-07-18 PROCEDURE — 25010000002 PROPOFOL 1000 MG/ML EMULSION: Performed by: NURSE ANESTHETIST, CERTIFIED REGISTERED

## 2018-07-18 PROCEDURE — 25010000002 MIDAZOLAM PER 1 MG: Performed by: ANESTHESIOLOGY

## 2018-07-18 PROCEDURE — 43775 LAP SLEEVE GASTRECTOMY: CPT | Performed by: SURGERY

## 2018-07-18 PROCEDURE — 82962 GLUCOSE BLOOD TEST: CPT

## 2018-07-18 PROCEDURE — 25010000002 METOCLOPRAMIDE PER 10 MG: Performed by: SURGERY

## 2018-07-18 PROCEDURE — 25010000002 ENOXAPARIN PER 10 MG: Performed by: SURGERY

## 2018-07-18 RX ORDER — SODIUM CHLORIDE 0.9 % (FLUSH) 0.9 %
1-10 SYRINGE (ML) INJECTION AS NEEDED
Status: DISCONTINUED | OUTPATIENT
Start: 2018-07-18 | End: 2018-07-18 | Stop reason: HOSPADM

## 2018-07-18 RX ORDER — ONDANSETRON 2 MG/ML
4 INJECTION INTRAMUSCULAR; INTRAVENOUS EVERY 6 HOURS PRN
Status: DISCONTINUED | OUTPATIENT
Start: 2018-07-18 | End: 2018-07-21 | Stop reason: HOSPADM

## 2018-07-18 RX ORDER — HYDROCODONE BITARTRATE AND ACETAMINOPHEN 7.5; 325 MG/1; MG/1
1 TABLET ORAL EVERY 4 HOURS PRN
Status: DISCONTINUED | OUTPATIENT
Start: 2018-07-18 | End: 2018-07-21 | Stop reason: HOSPADM

## 2018-07-18 RX ORDER — SODIUM CHLORIDE 9 MG/ML
INJECTION, SOLUTION INTRAVENOUS AS NEEDED
Status: DISCONTINUED | OUTPATIENT
Start: 2018-07-18 | End: 2018-07-18 | Stop reason: HOSPADM

## 2018-07-18 RX ORDER — PROMETHAZINE HYDROCHLORIDE 25 MG/ML
12.5 INJECTION, SOLUTION INTRAMUSCULAR; INTRAVENOUS EVERY 6 HOURS PRN
Status: DISCONTINUED | OUTPATIENT
Start: 2018-07-18 | End: 2018-07-21 | Stop reason: HOSPADM

## 2018-07-18 RX ORDER — PROMETHAZINE HYDROCHLORIDE 25 MG/1
25 SUPPOSITORY RECTAL ONCE AS NEEDED
Status: COMPLETED | OUTPATIENT
Start: 2018-07-18 | End: 2018-07-18

## 2018-07-18 RX ORDER — HYDROMORPHONE HYDROCHLORIDE 2 MG/1
2 TABLET ORAL EVERY 4 HOURS PRN
Status: DISCONTINUED | OUTPATIENT
Start: 2018-07-18 | End: 2018-07-21 | Stop reason: HOSPADM

## 2018-07-18 RX ORDER — DULOXETIN HYDROCHLORIDE 30 MG/1
30 CAPSULE, DELAYED RELEASE ORAL DAILY
Status: DISCONTINUED | OUTPATIENT
Start: 2018-07-19 | End: 2018-07-21 | Stop reason: HOSPADM

## 2018-07-18 RX ORDER — CHLORHEXIDINE GLUCONATE 0.12 MG/ML
15 RINSE ORAL ONCE
Status: COMPLETED | OUTPATIENT
Start: 2018-07-18 | End: 2018-07-18

## 2018-07-18 RX ORDER — LORAZEPAM 1 MG/1
1 TABLET ORAL EVERY 12 HOURS PRN
Status: DISCONTINUED | OUTPATIENT
Start: 2018-07-18 | End: 2018-07-21 | Stop reason: HOSPADM

## 2018-07-18 RX ORDER — CLONIDINE HYDROCHLORIDE 0.1 MG/1
0.1 TABLET ORAL EVERY 6 HOURS PRN
Status: DISCONTINUED | OUTPATIENT
Start: 2018-07-18 | End: 2018-07-21 | Stop reason: HOSPADM

## 2018-07-18 RX ORDER — PROPOFOL 10 MG/ML
VIAL (ML) INTRAVENOUS AS NEEDED
Status: DISCONTINUED | OUTPATIENT
Start: 2018-07-18 | End: 2018-07-18 | Stop reason: SURG

## 2018-07-18 RX ORDER — SODIUM CHLORIDE 9 MG/ML
INJECTION, SOLUTION INTRAVENOUS CONTINUOUS PRN
Status: DISCONTINUED | OUTPATIENT
Start: 2018-07-18 | End: 2018-07-18 | Stop reason: SURG

## 2018-07-18 RX ORDER — ONDANSETRON 4 MG/1
4 TABLET, FILM COATED ORAL EVERY 6 HOURS PRN
Status: DISCONTINUED | OUTPATIENT
Start: 2018-07-18 | End: 2018-07-21 | Stop reason: HOSPADM

## 2018-07-18 RX ORDER — PROMETHAZINE HYDROCHLORIDE 25 MG/1
25 TABLET ORAL ONCE AS NEEDED
Status: COMPLETED | OUTPATIENT
Start: 2018-07-18 | End: 2018-07-18

## 2018-07-18 RX ORDER — LABETALOL HYDROCHLORIDE 5 MG/ML
10 INJECTION, SOLUTION INTRAVENOUS
Status: DISCONTINUED | OUTPATIENT
Start: 2018-07-18 | End: 2018-07-21 | Stop reason: HOSPADM

## 2018-07-18 RX ORDER — GLYCOPYRROLATE 0.2 MG/ML
INJECTION INTRAMUSCULAR; INTRAVENOUS AS NEEDED
Status: DISCONTINUED | OUTPATIENT
Start: 2018-07-18 | End: 2018-07-18 | Stop reason: SURG

## 2018-07-18 RX ORDER — LIDOCAINE HYDROCHLORIDE 10 MG/ML
0.5 INJECTION, SOLUTION EPIDURAL; INFILTRATION; INTRACAUDAL; PERINEURAL ONCE AS NEEDED
Status: COMPLETED | OUTPATIENT
Start: 2018-07-18 | End: 2018-07-18

## 2018-07-18 RX ORDER — ONDANSETRON 2 MG/ML
INJECTION INTRAMUSCULAR; INTRAVENOUS AS NEEDED
Status: DISCONTINUED | OUTPATIENT
Start: 2018-07-18 | End: 2018-07-18 | Stop reason: SURG

## 2018-07-18 RX ORDER — DIPHENHYDRAMINE HYDROCHLORIDE 50 MG/ML
25 INJECTION INTRAMUSCULAR; INTRAVENOUS EVERY 4 HOURS PRN
Status: DISCONTINUED | OUTPATIENT
Start: 2018-07-18 | End: 2018-07-21 | Stop reason: HOSPADM

## 2018-07-18 RX ORDER — SIMETHICONE 80 MG
80 TABLET,CHEWABLE ORAL 4 TIMES DAILY PRN
Status: DISCONTINUED | OUTPATIENT
Start: 2018-07-18 | End: 2018-07-21 | Stop reason: HOSPADM

## 2018-07-18 RX ORDER — MAGNESIUM HYDROXIDE 1200 MG/15ML
LIQUID ORAL AS NEEDED
Status: DISCONTINUED | OUTPATIENT
Start: 2018-07-18 | End: 2018-07-18 | Stop reason: HOSPADM

## 2018-07-18 RX ORDER — PANTOPRAZOLE SODIUM 40 MG/10ML
40 INJECTION, POWDER, LYOPHILIZED, FOR SOLUTION INTRAVENOUS ONCE
Status: COMPLETED | OUTPATIENT
Start: 2018-07-18 | End: 2018-07-18

## 2018-07-18 RX ORDER — PROMETHAZINE HYDROCHLORIDE 25 MG/ML
INJECTION, SOLUTION INTRAMUSCULAR; INTRAVENOUS AS NEEDED
Status: DISCONTINUED | OUTPATIENT
Start: 2018-07-18 | End: 2018-07-18 | Stop reason: SURG

## 2018-07-18 RX ORDER — CYANOCOBALAMIN 1000 UG/ML
1000 INJECTION, SOLUTION INTRAMUSCULAR; SUBCUTANEOUS ONCE
Status: COMPLETED | OUTPATIENT
Start: 2018-07-19 | End: 2018-07-19

## 2018-07-18 RX ORDER — PROMETHAZINE HYDROCHLORIDE 25 MG/ML
6.25 INJECTION, SOLUTION INTRAMUSCULAR; INTRAVENOUS ONCE AS NEEDED
Status: COMPLETED | OUTPATIENT
Start: 2018-07-18 | End: 2018-07-18

## 2018-07-18 RX ORDER — SODIUM CHLORIDE, SODIUM LACTATE, POTASSIUM CHLORIDE, CALCIUM CHLORIDE 600; 310; 30; 20 MG/100ML; MG/100ML; MG/100ML; MG/100ML
9 INJECTION, SOLUTION INTRAVENOUS CONTINUOUS
Status: DISCONTINUED | OUTPATIENT
Start: 2018-07-18 | End: 2018-07-18

## 2018-07-18 RX ORDER — FAMOTIDINE 20 MG/1
20 TABLET, FILM COATED ORAL ONCE
Status: DISCONTINUED | OUTPATIENT
Start: 2018-07-18 | End: 2018-07-18

## 2018-07-18 RX ORDER — MORPHINE SULFATE 4 MG/ML
6 INJECTION, SOLUTION INTRAMUSCULAR; INTRAVENOUS
Status: DISCONTINUED | OUTPATIENT
Start: 2018-07-18 | End: 2018-07-21 | Stop reason: HOSPADM

## 2018-07-18 RX ORDER — SODIUM CHLORIDE 9 MG/ML
150 INJECTION, SOLUTION INTRAVENOUS CONTINUOUS
Status: DISCONTINUED | OUTPATIENT
Start: 2018-07-18 | End: 2018-07-18 | Stop reason: HOSPADM

## 2018-07-18 RX ORDER — MIDAZOLAM HYDROCHLORIDE 1 MG/ML
2 INJECTION INTRAMUSCULAR; INTRAVENOUS
Status: DISCONTINUED | OUTPATIENT
Start: 2018-07-18 | End: 2018-07-18 | Stop reason: HOSPADM

## 2018-07-18 RX ORDER — LORAZEPAM 2 MG/ML
0.5 INJECTION INTRAMUSCULAR EVERY 12 HOURS PRN
Status: DISCONTINUED | OUTPATIENT
Start: 2018-07-18 | End: 2018-07-21 | Stop reason: HOSPADM

## 2018-07-18 RX ORDER — CEFAZOLIN SODIUM 2 G/100ML
2 INJECTION, SOLUTION INTRAVENOUS ONCE
Status: COMPLETED | OUTPATIENT
Start: 2018-07-18 | End: 2018-07-18

## 2018-07-18 RX ORDER — NALOXONE HCL 0.4 MG/ML
0.4 VIAL (ML) INJECTION
Status: DISCONTINUED | OUTPATIENT
Start: 2018-07-18 | End: 2018-07-21 | Stop reason: HOSPADM

## 2018-07-18 RX ORDER — MIDAZOLAM HYDROCHLORIDE 1 MG/ML
1 INJECTION INTRAMUSCULAR; INTRAVENOUS
Status: DISCONTINUED | OUTPATIENT
Start: 2018-07-18 | End: 2018-07-18 | Stop reason: HOSPADM

## 2018-07-18 RX ORDER — SCOLOPAMINE TRANSDERMAL SYSTEM 1 MG/1
1 PATCH, EXTENDED RELEASE TRANSDERMAL ONCE
Status: DISCONTINUED | OUTPATIENT
Start: 2018-07-18 | End: 2018-07-21

## 2018-07-18 RX ORDER — PANTOPRAZOLE SODIUM 40 MG/10ML
40 INJECTION, POWDER, LYOPHILIZED, FOR SOLUTION INTRAVENOUS
Status: DISCONTINUED | OUTPATIENT
Start: 2018-07-19 | End: 2018-07-19

## 2018-07-18 RX ORDER — CEFAZOLIN SODIUM 2 G/100ML
2 INJECTION, SOLUTION INTRAVENOUS EVERY 8 HOURS
Status: COMPLETED | OUTPATIENT
Start: 2018-07-18 | End: 2018-07-19

## 2018-07-18 RX ORDER — DEXAMETHASONE SODIUM PHOSPHATE 10 MG/ML
INJECTION INTRAMUSCULAR; INTRAVENOUS AS NEEDED
Status: DISCONTINUED | OUTPATIENT
Start: 2018-07-18 | End: 2018-07-18 | Stop reason: SURG

## 2018-07-18 RX ORDER — NEOSTIGMINE METHYLSULFATE 1 MG/ML
INJECTION, SOLUTION INTRAVENOUS AS NEEDED
Status: DISCONTINUED | OUTPATIENT
Start: 2018-07-18 | End: 2018-07-18 | Stop reason: SURG

## 2018-07-18 RX ORDER — LIDOCAINE HYDROCHLORIDE 10 MG/ML
INJECTION, SOLUTION EPIDURAL; INFILTRATION; INTRACAUDAL; PERINEURAL AS NEEDED
Status: DISCONTINUED | OUTPATIENT
Start: 2018-07-18 | End: 2018-07-18 | Stop reason: SURG

## 2018-07-18 RX ORDER — FAMOTIDINE 10 MG/ML
20 INJECTION, SOLUTION INTRAVENOUS ONCE
Status: DISCONTINUED | OUTPATIENT
Start: 2018-07-18 | End: 2018-07-18

## 2018-07-18 RX ORDER — ALBUTEROL SULFATE 2.5 MG/3ML
2.5 SOLUTION RESPIRATORY (INHALATION) EVERY 4 HOURS PRN
Status: DISCONTINUED | OUTPATIENT
Start: 2018-07-18 | End: 2018-07-21 | Stop reason: HOSPADM

## 2018-07-18 RX ORDER — FENTANYL CITRATE 50 UG/ML
50 INJECTION, SOLUTION INTRAMUSCULAR; INTRAVENOUS
Status: DISCONTINUED | OUTPATIENT
Start: 2018-07-18 | End: 2018-07-18 | Stop reason: HOSPADM

## 2018-07-18 RX ORDER — ACETAMINOPHEN 325 MG/1
650 TABLET ORAL ONCE
Status: COMPLETED | OUTPATIENT
Start: 2018-07-18 | End: 2018-07-18

## 2018-07-18 RX ORDER — SODIUM CHLORIDE AND POTASSIUM CHLORIDE 150; 450 MG/100ML; MG/100ML
125 INJECTION, SOLUTION INTRAVENOUS CONTINUOUS
Status: DISCONTINUED | OUTPATIENT
Start: 2018-07-19 | End: 2018-07-21 | Stop reason: HOSPADM

## 2018-07-18 RX ORDER — MORPHINE SULFATE 4 MG/ML
4 INJECTION, SOLUTION INTRAMUSCULAR; INTRAVENOUS
Status: DISCONTINUED | OUTPATIENT
Start: 2018-07-18 | End: 2018-07-21 | Stop reason: HOSPADM

## 2018-07-18 RX ORDER — SODIUM CHLORIDE, SODIUM LACTATE, POTASSIUM CHLORIDE, CALCIUM CHLORIDE 600; 310; 30; 20 MG/100ML; MG/100ML; MG/100ML; MG/100ML
150 INJECTION, SOLUTION INTRAVENOUS CONTINUOUS
Status: DISCONTINUED | OUTPATIENT
Start: 2018-07-18 | End: 2018-07-19

## 2018-07-18 RX ORDER — FENTANYL CITRATE 50 UG/ML
INJECTION, SOLUTION INTRAMUSCULAR; INTRAVENOUS AS NEEDED
Status: DISCONTINUED | OUTPATIENT
Start: 2018-07-18 | End: 2018-07-18 | Stop reason: SURG

## 2018-07-18 RX ORDER — METOCLOPRAMIDE HYDROCHLORIDE 5 MG/ML
10 INJECTION INTRAMUSCULAR; INTRAVENOUS EVERY 6 HOURS PRN
Status: DISCONTINUED | OUTPATIENT
Start: 2018-07-18 | End: 2018-07-21 | Stop reason: HOSPADM

## 2018-07-18 RX ORDER — VECURONIUM BROMIDE 1 MG/ML
INJECTION, POWDER, LYOPHILIZED, FOR SOLUTION INTRAVENOUS AS NEEDED
Status: DISCONTINUED | OUTPATIENT
Start: 2018-07-18 | End: 2018-07-18 | Stop reason: SURG

## 2018-07-18 RX ADMIN — ONDANSETRON 4 MG: 2 INJECTION INTRAMUSCULAR; INTRAVENOUS at 16:15

## 2018-07-18 RX ADMIN — ONDANSETRON 4 MG: 2 INJECTION INTRAMUSCULAR; INTRAVENOUS at 22:43

## 2018-07-18 RX ADMIN — LIDOCAINE HYDROCHLORIDE 0.5 ML: 10 INJECTION, SOLUTION EPIDURAL; INFILTRATION; INTRACAUDAL; PERINEURAL at 11:54

## 2018-07-18 RX ADMIN — SODIUM CHLORIDE, POTASSIUM CHLORIDE, SODIUM LACTATE AND CALCIUM CHLORIDE 150 ML/HR: 600; 310; 30; 20 INJECTION, SOLUTION INTRAVENOUS at 15:54

## 2018-07-18 RX ADMIN — GLYCOPYRROLATE 0.4 MG: 0.2 INJECTION, SOLUTION INTRAMUSCULAR; INTRAVENOUS at 14:14

## 2018-07-18 RX ADMIN — DEXAMETHASONE SODIUM PHOSPHATE 8 MG: 10 INJECTION INTRAMUSCULAR; INTRAVENOUS at 13:07

## 2018-07-18 RX ADMIN — FENTANYL CITRATE 100 MCG: 50 INJECTION, SOLUTION INTRAMUSCULAR; INTRAVENOUS at 12:52

## 2018-07-18 RX ADMIN — PROMETHAZINE HYDROCHLORIDE 10 MG: 25 INJECTION INTRAMUSCULAR; INTRAVENOUS at 14:28

## 2018-07-18 RX ADMIN — SCOPALAMINE 1 PATCH: 1 PATCH, EXTENDED RELEASE TRANSDERMAL at 11:48

## 2018-07-18 RX ADMIN — METOCLOPRAMIDE 10 MG: 5 INJECTION, SOLUTION INTRAMUSCULAR; INTRAVENOUS at 17:54

## 2018-07-18 RX ADMIN — LIDOCAINE HYDROCHLORIDE 50 MG: 10 INJECTION, SOLUTION EPIDURAL; INFILTRATION; INTRACAUDAL; PERINEURAL at 12:52

## 2018-07-18 RX ADMIN — PROMETHAZINE HYDROCHLORIDE 12.5 MG: 25 INJECTION INTRAMUSCULAR; INTRAVENOUS at 19:18

## 2018-07-18 RX ADMIN — CEFAZOLIN SODIUM 2 G: 2 INJECTION, SOLUTION INTRAVENOUS at 12:46

## 2018-07-18 RX ADMIN — PROMETHAZINE HYDROCHLORIDE: 25 INJECTION INTRAMUSCULAR; INTRAVENOUS at 15:20

## 2018-07-18 RX ADMIN — LORAZEPAM 1 MG: 1 TABLET ORAL at 22:43

## 2018-07-18 RX ADMIN — SODIUM CHLORIDE: 9 INJECTION, SOLUTION INTRAVENOUS at 12:46

## 2018-07-18 RX ADMIN — ACETAMINOPHEN 650 MG: 325 TABLET, FILM COATED ORAL at 11:48

## 2018-07-18 RX ADMIN — VECURONIUM BROMIDE 10 MG: 1 INJECTION, POWDER, LYOPHILIZED, FOR SOLUTION INTRAVENOUS at 12:52

## 2018-07-18 RX ADMIN — PROPOFOL 25 MCG/KG/MIN: 10 INJECTION, EMULSION INTRAVENOUS at 12:54

## 2018-07-18 RX ADMIN — CHLORHEXIDINE GLUCONATE 15 ML: 1.2 RINSE ORAL at 11:55

## 2018-07-18 RX ADMIN — PROPOFOL 300 MG: 10 INJECTION, EMULSION INTRAVENOUS at 12:52

## 2018-07-18 RX ADMIN — PANTOPRAZOLE SODIUM 40 MG: 40 INJECTION, POWDER, FOR SOLUTION INTRAVENOUS at 11:48

## 2018-07-18 RX ADMIN — CHLORHEXIDINE GLUCONATE 15 ML: 1.2 RINSE ORAL at 11:48

## 2018-07-18 RX ADMIN — DEXAMETHASONE SODIUM PHOSPHATE 60 ML: 10 INJECTION, SOLUTION INTRAMUSCULAR; INTRAVENOUS at 12:53

## 2018-07-18 RX ADMIN — MORPHINE SULFATE 4 MG: 4 INJECTION, SOLUTION INTRAMUSCULAR; INTRAVENOUS at 19:18

## 2018-07-18 RX ADMIN — ONDANSETRON 4 MG: 2 INJECTION INTRAMUSCULAR; INTRAVENOUS at 14:14

## 2018-07-18 RX ADMIN — SODIUM CHLORIDE 150 ML/HR: 9 INJECTION, SOLUTION INTRAVENOUS at 12:07

## 2018-07-18 RX ADMIN — SODIUM CHLORIDE 1000 ML: 9 INJECTION, SOLUTION INTRAVENOUS at 11:48

## 2018-07-18 RX ADMIN — NEOSTIGMINE METHYLSULFATE 3 MG: 1 INJECTION, SOLUTION INTRAVENOUS at 14:14

## 2018-07-18 RX ADMIN — CEFAZOLIN SODIUM 2 G: 2 INJECTION, SOLUTION INTRAVENOUS at 20:42

## 2018-07-18 RX ADMIN — MIDAZOLAM HYDROCHLORIDE 2 MG: 1 INJECTION, SOLUTION INTRAMUSCULAR; INTRAVENOUS at 12:32

## 2018-07-18 NOTE — ANESTHESIA PROCEDURE NOTES
Airway  Urgency: elective    Airway not difficult    General Information and Staff    Patient location during procedure: OR  CRNA: TALI BUSTAMANTE    Indications and Patient Condition  Indications for airway management: airway protection    Preoxygenated: yes  MILS maintained throughout  Mask difficulty assessment: 2 - vent by mask + OA or adjuvant +/- NMBA    Final Airway Details  Final airway type: endotracheal airway      Successful airway: ETT  Cuffed: yes   Successful intubation technique: direct laryngoscopy  Facilitating devices/methods: intubating stylet  Endotracheal tube insertion site: oral  Blade: Spear  Blade size: #2  ETT size: 7.0 mm  Cormack-Lehane Classification: grade I - full view of glottis  Placement verified by: chest auscultation and capnometry   Measured from: lips  ETT to lips (cm): 22  Number of attempts at approach: 1    Additional Comments  Pt to OR 20. Pt moved self to OR table. ASA monitors placed. Pre-O2 with 100% Oxygen. SIVI. Atraumatic intubation.

## 2018-07-18 NOTE — ANESTHESIA PREPROCEDURE EVALUATION
Anesthesia Evaluation     Patient summary reviewed and Nursing notes reviewed                Airway   Mallampati: II  Dental      Pulmonary    (+) pulmonary embolism,   Cardiovascular     (+) hyperlipidemia,       Neuro/Psych- negative ROS  GI/Hepatic/Renal/Endo    (+) morbid obesity, GERD,      Musculoskeletal (-) negative ROS    Abdominal    Substance History - negative use     OB/GYN negative ob/gyn ROS         Other                        Anesthesia Plan    ASA 3     general and regional     intravenous induction   Anesthetic plan and risks discussed with patient.

## 2018-07-18 NOTE — ANESTHESIA PROCEDURE NOTES
Tap block    Patient location during procedure: OR  Reason for block: at surgeon's request and post-op pain management  Performed by  CRNA: TALI BUSTAMANTE  Assisted by: ARUN ESPINOZA  Preanesthetic Checklist  Completed: patient identified, site marked, surgical consent, pre-op evaluation, timeout performed, IV checked, risks and benefits discussed and monitors and equipment checked  Prep:  Pt Position: supine  Sterile barriers:cap, gloves, sterile barriers and mask  Prep: ChloraPrep  Patient monitoring: blood pressure monitoring, continuous pulse oximetry and EKG  Procedure  Sedation:yes  Performed under: general  Guidance:ultrasound guided  Images:still images obtained    Laterality:Bilateral  Block Type:TAP  Injection Technique:single-shot  Needle Type:short-bevel and echogenic  Needle Gauge:20 G    Medications  Comment:Block Injection:  LA dose divided between Right and Left block       Adjuncts:  Decadron 4mg PSF, Buprenex 0.3mg (Per total volume of LA)  Local Injected:bupivacaine 0.25% Local Amount Injected:60mL  Post Assessment  Injection Assessment: negative aspiration for heme, incremental injection and no paresthesia on injection  Patient Tolerance:comfortable throughout block  Complications:no  Additional Notes      Under Ultrasound guidance, a BBraun 4inch 360 degree needle was advanced with Normal Saline hydro dissection of tissue.  The Internal Oblique and Transversus Abdominus muscles where visualized.  At or before the aponeurosis of Internal Oblique, local anesthetic spread was visualized in the Transversus Abdominus Plane. Injection was made incrementally with aspiration every 5 mls.  There was no  intravascular injection,  injection pressure was normal, there was no neural injection, and the procedure was completed without difficulty.  Thank You.

## 2018-07-18 NOTE — H&P (VIEW-ONLY)
"[facility] BARIATRIC SURGERY  [address]  [facility] [address] [phone]      Patient  Name:  Carlos Heller  :  1980      Date of Visit: 18    Chief Complaint:  weight gain; unable to maintain weight loss.  Preop LSG    History of Present Illness:  Carlos Heller is a 37 y.o. female who presents today for evaluation, education and consultation regarding weight loss surgery. Since last seen 18 she has lost 1 lb.  The patient returns for final visit prior to LSG.  Original intake evaluation   reviewed.  Aware at her BMI LSG may be 1st stage proc.  Was going to do this last year but couldn't quit smoking.  I did her cousin's LSG 2 years ago \"she stays on me\"  Says she's \"real thin\".   The patient has had issues with morbid obesity for years and only temporary success with non-surgical methods of weight loss.  The patient is seeking LSG to help with the morbid obesity related conditions of depression, fatigue, fibromyalgia, GERD, HLD, joint pain, ADDI, hx PE, IDDM, elevated ALT, thyrmegaly.  She is exposed to 2nd hand smoke at work but says she will avoid for the requisite period.  Patient voiced understanding that it is my strong recommendation NOT to proceed with LSG if cannot be tobacco and 2nd hand smoke free for at least 2 wks pre and 6 wks postoperatively as the risk of leak is prohibitive.  Also explained that I am aware of one delayed leak where the only risk factor was nicotene and I recommended they avoid this as well for 2 weeks prior and 6 weeks after LSG to minimize the risk of leak.      From 's eval :    Patient has h/o PE in , unknown etiology.   Hospitalized x one week. Treated with warfarin x 6 months. Did not see hematology.  No anticoagulation currently. No DVT since.       She does have chronic joint pain which she feels is related to obesity. Has had corticosteroid injections of knee, last 3/2018. Also with " carpal tunnel, steroid injection of hand 2018. Taking daily NSAIDS.  Ibuprofen, Voltaren and mobic all on med list, patient is a little uncertain which she is taking, mobic seemingly a new Rx.      GI history of GERD, not fully controlled on BID PPI, does take tums prn. Patient had EGD apart of bariatric workup 2017 fairly unremarkable. No findings of HH.  Otherwise, no GI complaints of nausea, vomiting, abd pain, dysphagia, bowel issues. Still has GB.      She is a former smoker. 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 She has a couple client's currently smoking.       Past Medical History:   Diagnosis Date   • Carpal tunnel syndrome     NSAIDS daily, also gets steroid injection of right hand, last2018   • Depression    • Elevated ALT measurement    • Fatigue    • Fibromyalgia     patient thinks related to obesity   • Former smoker    • GERD (gastroesophageal reflux disease)     omeprazole BID and tums prn.  EGD GDW  40 cm, no HH, path sugg IGNACIO. serum h. pyl neg   • Hx MRSA infection     on (L) leg, treated w/ I&D, IV abx.      • Hyperlipidemia    • Joint pain     back, shoulder, hips. Recented Rx mobic. Steroid Injection of  L knee 3/2018   • Morbid obesity    • ADDI (obstructive sleep apnea)     semi compliant with CPAP   • Pulmonary embolism     unknown etiology, tx w/ coumadin   • Type 2 diabetes mellitus     dx , follows w/ Endocrinology, on insulin since  checks sugar once a week   • Wears glasses      Past Surgical History:   Procedure Laterality Date   • ABDOMINAL HYSTERECTOMY      d/t postpartum bleeding  with ,  partial    • BILATERAL BREAST REDUCTION     • CARPAL TUNNEL RELEASE Left    •  SECTION  , 2009    x2, horizontal   • ENDOSCOPY N/A 2017    Procedure: ESOPHAGOGASTRODUODENOSCOPY;  Surgeon: Jean Cardozo MD;  [facility]: [facility] ENDOSCOPY;  Service:    • LAPAROSCOPIC SALPINGOOPHERECTOMY   2000    for tubal pregnancy   • OTHER SURGICAL HISTORY      denies anesthesia issues   • WISDOM TOOTH EXTRACTION  1998       Allergies   Allergen Reactions   • Influenza Vaccines GI Intolerance   • Wellbutrin [Bupropion] Palpitations       Current Outpatient Prescriptions:   •  DULoxetine (CYMBALTA) 30 MG capsule, , Disp: , Rfl:   •  Insulin Glulisine (APIDRA IJ), Inject  as directed., Disp: , Rfl:   •  LANTUS SOLOSTAR 100 UNIT/ML injection pen, INJECT 40 UNITS EVERY A.M. AND 40 UNITS EVERY BEDTIME, Disp: , Rfl: 2  •  omeprazole (priLOSEC) 20 MG capsule, Take 20 mg by mouth 2 (Two) Times a Day As Needed (heartburn)., Disp: , Rfl:   •  pravastatin (PRAVACHOL) 10 MG tablet, Take 80 mg by mouth Daily., Disp: , Rfl:   •  vitamin D (ERGOCALCIFEROL) 40737 units capsule capsule, TAKE 1 CAPSULE(S) EVERY WEEK BY ORAL ROUTE FOR 30 DAYS., Disp: , Rfl: 1  •  ibuprofen (ADVIL,MOTRIN) 800 MG tablet, Take 800 mg by mouth As Needed for mild pain (1-3)., Disp: , Rfl:   •  meloxicam (MOBIC) 7.5 MG tablet, Take 7.5 mg by mouth As Needed., Disp: , Rfl:     Social History     Social History   • Marital status: Single     Spouse name: N/A   • Number of children: 3   • Years of education: GED     Occupational History   • Caregiver w/ [employer]      Social History Main Topics   • Smoking status: Former Smoker     Packs/day: 0.50     Years: 20.00     Types: Cigarettes     Quit date: 01/2017   • Smokeless tobacco: Never Used   • Alcohol use No   • Drug use: No   • Sexual activity: Not on file      Comment: no hormones     Other Topics Concern   • Not on file     Social History Narrative    Lives in [place], [place] w/ 3 children.  Works as caregiver.      Family History   Problem Relation Age of Onset   • Diabetes Mother 35   • Hypertension Brother    • Sleep apnea Brother    • No Known Problems Father    • No Known Problems Sister    • No Known Problems Maternal Grandmother    • No Known Problems Maternal Grandfather    • No Known Problems  Paternal Grandmother    • No Known Problems Paternal Grandfather        Review of Systems   Constitutional: Positive for fatigue. Negative for chills, diaphoresis, fever, unexpected weight gain and unexpected weight loss.   HENT: Negative for congestion and facial swelling.    Eyes: Negative for blurred vision, double vision and discharge.   Respiratory: Negative for chest tightness, shortness of breath and stridor.    Cardiovascular: Negative for chest pain, palpitations and leg swelling.   Gastrointestinal: Negative for blood in stool.   Endocrine: Negative for polydipsia.   Genitourinary: Negative for hematuria.   Musculoskeletal: Positive for arthralgias.   Skin: Negative for color change.   Allergic/Immunologic: Negative for immunocompromised state.   Neurological: Negative for confusion.   Psychiatric/Behavioral: Negative for self-injury.       I have reviewed the ROS and confirm that it's accurate today.    Physical Exam:  Vital Signs:  Weight: (!) 152 kg (334 lb 0.2 oz)   Body mass index is 56.45 kg/m².  Temp: 97.2 °F (36.2 °C)   Heart Rate: 56   BP: 139/86     Physical Exam   Constitutional: She is oriented to person, place, and time. She appears well-developed and well-nourished.   HENT:   Head: Normocephalic and atraumatic.   Nose: Nose normal.   Eyes: Conjunctivae and EOM are normal. Pupils are equal, round, and reactive to light.   Neck: Normal range of motion. Neck supple. Carotid bruit is not present. No tracheal deviation present. Thyromegaly present.   Cardiovascular: Normal rate, regular rhythm and normal heart sounds.    Pulmonary/Chest: Effort normal and breath sounds normal. No respiratory distress.   Abdominal: Soft. She exhibits no distension. There is no hepatosplenomegaly. There is no tenderness.   pfannenstiel   Musculoskeletal: Normal range of motion. She exhibits no edema or deformity.   Neurological: She is alert and oriented to person, place, and time. No cranial nerve deficit.  Coordination normal.   Skin: Skin is warm and dry. No rash noted.   Tattoo left foot   Psychiatric: She has a normal mood and affect. Her behavior is normal. Judgment and thought content normal.   Vitals reviewed.      Patient Active Problem List   Diagnosis   • Type 2 diabetes mellitus   • Sleep apnea   • Morbid obesity   • Joint pain   • Hyperlipidemia   • Hx MRSA infection   • GERD (gastroesophageal reflux disease)   • Fatigue   • Former smoker   • Carpal tunnel syndrome   • ADDI (obstructive sleep apnea)   • Pulmonary embolism   • Depression   • Fibromyalgia   • Wears glasses   Psych Nadine Stroud PhD 1/18, 2/18 approp  [facility]    Assessment:    Carlos Heller is a 37 y.o. year old female with medically complicated obesity.    Weight loss surgery is deemed medically necessary given the following obesity related comorbidities including depression, fatigue, fibromyalgia, GERD, HLD, joint pain, ADDI, hx PE, IDDM, elevated ALT, thyrmegaly with current Weight: (!) 152 kg (334 lb 0.2 oz) and Body mass index is 56.45 kg/m²..    Patient is aware that surgery is a tool, and that weight loss is not guaranteed but only seen in the context of appropriate use, follow up and exercise.    The patient was present for an approximately a 2.5 hour discussion of the purpose of weight loss surgery, how WLS is a tool to assist in achieving weight loss goals, the most common complications and how best to avoid them, and the strategies for short and long term weight loss.  Ample opportunity to discuss questions was available both in group and during the time of individual examination.    I reviewed CBC, CMP, EKG, CXR, EGD, HP, Cardiac Clearance, PFT's, Psych evaluation and RUTHY.  Please see scanned records that I have reviewed and signed off on today.  All of this in addition to the patient's unique history and exam has been taken into consideration in determining their appropriate candidacy for weight loss  "surgery.    Complications  of laparoscopic/possible robotic gastric sleeve were discussed. The patient is well aware of the potential complications of surgery that include but not limited to bleeding, infections, deep venous thrombosis, pulmonary embolism, pulmonary complications such as pneumonia, cardiac events, hernias, small bowel obstruction, damage to the spleen or other organs, bowel injury, disfiguring scars, failure to lose weight, need for additional surgery, conversion to an open procedure, and death. Patient is also aware of complications which apply in this particular procedure that can include but are not limited to a \"leak\" at the staple line which in some instances may require conversion to gastric bypass.    The patient is aware if a hiatal hernia is encountered, it likely will be repaired.  R/B/A Rx to hiatal hernia repair were discussed as outlined in our long consent form.  Briefly risks in addition to those for LSG include recurrent hernia, IGNACIO, dysphagia, esophageal injury, pneumothorax, injury to the vagus nerves, injury to the thoracic duct, aorta or vena cava.    I discussed avoiding all tobacco products and second hand smoke at least 2 weeks pre-operatively and 6 weeks post-operatively to minimize the risk of sleeve leak.  This included discussing the importance of avoiding even secondhand smoke as the risk of leak is increased.  Examples discussed:  I made it very clear that the patient understands they should avoid even riding in a car where someone has previously smoked in the last 2 weeks, living in a house where someone smokes (even if it's in a separate room/patio/attached garage, etc.) we discussed that they should not have a conversation with a group of people who are smoking even if it's outside.  They can be around wood burning fires and barbecue.  I told them I do not know if marijuana has a same effects but my overall recommendation is to avoid it for 2 weeks prior in 6 weeks " after surgery.  They also are aware that nicotine may also increase the risk of leak and I strongly encouraged him to avoid that as well for 2 weeks prior in 6 weeks after surgery.    Discussed the risks, benefits and alternative therapies at great length as outlined in our extensive consent forms, consent videos, and educational teaching process under the direction of the center's .    A copy of the patient's signed informed consent is on file.    R/B/A Rx discussed to postop anticoagulation incl but not limited to bleeding, drug reaction, venothromboembolic events, etc. and agreeable to taking post op  Eliquis 2.5 mg po Q 12 hrs #42      Plan:  Laparoscopic sleeve gastrectomy, poss lap HHR.  Check PCR.  Tobacco concerns as above.  Super MO. IDDM. Semi-compliant with CPAP.  All increase risk.        Jean Cardozo MD

## 2018-07-18 NOTE — OP NOTE
Preoperative Diagnosis:   Super Morbid Obesity (359 lbs/BMI 56.3) with Multiple Co-Morbidities    Postoperative Diagnosis:   Same    Procedure:                                                      Laparoscopic Sleeve Gastrectomy (85% subtotal vertical gastrectomy) over a 36 Slovak Bougie Dilator                                                                        EGD                                                                       Surgeon:                                                       NAVJOT Cardozo MD    Anesthesia:                                                   GETA    EBL:                                                              50 cc    Fluids:                                                           Crystalloid    Specimens:                                                   Subtotal gastrectomy    Drains:                                                           None    Counts:                                                          Correct    Complications:                                               None    Indications:   This is a 37 year-old super morbidly obese black female (334 lbs/BMI 56.45) who presents for elective laparoscopic sleeve gastrectomy.  She's undergone our extensive preoperative education teaching and consent process everything's in order and she wishes to proceed.      Operative technique:     The patient was brought to the operating room, and placed supine upon the operating room table.  SCD hose were placed, she underwent uneventful general endotracheal anesthesia per the anesthesiology staff, she received IV Ancef and subcutaneous Lovenox, the anesthesiology staff performed a tap block, and her abdomen was prepped and draped with ChloraPrep in a sterile fashion, an Ioban was used as well, a Corbett catheter was not placed.    The peritoneal cavity was entered in the upper abdomen to the left of midline using an 11 mm trocar and an Optiview technique and the  abdomen was insufflated to a pressure of 15 mmHg with CO2 gas.  Exploratory laparoscopy revealed no evidence of injury from the entrance technique, an enlarged, finely nodular, patchy discoloration pathces c/w prob MARTINEZ/mild fibrosis, normal appearing gallbladder, an omental adhesion deep in the right pelvis, no other abnormalities noted.  Remaining trocars were placed under direct visualization including an additional 11 mm trocar in the left mid abdomen, 5 mm extra long trocar in the right upper quadrant and 5 mm standard length trocar in the left subcostal position, and in the upper abdomen to the right of midline a 15 mm trocar was placed.  Through a stab incision in the epigastrium and Robb retractors used to elevate the left lobe of the liver exposing the hiatus.  There was no visible hiatal hernia from the anterior view and this was photodocumented.  Beginning approximately two thirds of the way around the greater curvature the stomach, the gastrocolic vessels were divided with the LigaSure device.  This proceeded proximally taking down all the short gastric vessels and exposing the left edson.  There were no posterior hernias or lipomas and this was photodocumented.  Gastrocolic vessels were then divided medially to a couple cm proximal to the pylorus.  Some of the filmy attachments of the posterior stomach to the pancreas and retroperitoneum were divided.  The anesthesiology staff passed a 36 Syrian blunt tip bougie dilator which was manipulated along the lesser curvature into the distal antrum.  The 85% subtotal vertical sleeve gastrectomy was then performed over the 36 Syrian bougie dilator using a  Medtronic electronic linear stapler with dual absorbable strips.  The first firing was a 60 mm black load, the next 4 firings were 60 mm purple loads, the final firing was a 45 mm purple load.   After clamping down the final load and prior to firing it the bougie dilator was removed.  The sleeve was  performed such that it was uniform in size, no hourglassing or narrowing, especially at the angularis, and the final firing was done a centimeter away from the angle of His to hopefully avoid incorporating esophageal fibers.  The subtotal gastrectomy specimen was placed into a large retrieval bag and withdrawn and placed on a separate Roe stand, it was a larger than average size specimen.  The sleeve was submerged under saline.  Upper endoscopy was performed, and the endoscope was advanced into the duodenal bulb.  No air bubbles or leak seen, no bleeding at the staple line, no narrowing at the angularis, no pyloric spasm or deformity, no gastritis, no hiatal hernia or Chaidez's esophagus, and the endoscope was withdrawn.  The subtotal gastrectomy specimen was inspected, staples were well formed confirming laparoscopic findings, and it was sent unopened to pathology for permanent section.  Irrigation fluid was suctioned free.  The sleeve was resting nicely and hemostatic.  Photodocumentation of the sleeve was obtained.  The Robb retractor was removed.  Fascia at the 15 mm trocar site incision was closed with a horizontal mattress 0 Vicryl suture placed with a suture passer under direct visualization and tying the knot extracorporeally.  Remaining trocars were removed under direct visualization, no bleeding noted from their sites.  Subcutaneous tissue in the 15 mm incision was closed with a figure-of-eight 2-0 Vicryl plus suture, and skin in each incision was closed using 3-0 Monocryl plus in an interrupted subcuticular stitch followed by skin-a-fix.  The patient tolerated the procedure well without complication, was taken to the recovery room in stable condition.

## 2018-07-18 NOTE — BRIEF OP NOTE
GASTRIC SLEEVE LAPAROSCOPIC, ESOPHAGOGASTRODUODENOSCOPY  Progress Note    Carlos Heller  7/18/2018    Pre-op Diagnosis:   Morbid obesity with body mass index (BMI) of 50.0 to 59.9 in adult [E66.01, Z68.43]       Post-Op Diagnosis Codes:     * Morbid obesity with body mass index (BMI) of 50.0 to 59.9 in adult (CMS/[facility]) [E66.01, Z68.43]    Procedure/CPT® Codes:  MA LAP, KARRIE RESTRICT PROC, LONGITUDINAL GASTRECTOMY [83426]  MA ESOPHAGOGASTRODUODENOSCOPY TRANSORAL DIAGNOSTIC [07616]    Procedure(s):  GASTRIC SLEEVE LAPAROSCOPICHIATAL  ESOPHAGOGASTRODUODENOSCOPY    Surgeon(s):  MD Edwin Perez MD  PGY-4    Anesthesia: General with Block    Staff:   Circulator: Hetal Giron RN  Scrub Person: Nila Bashir RN  Nursing Assistant: Noemí Leach    Estimated Blood Loss: minimal    Urine Voided: * No values recorded between 7/18/2018 12:44 PM and 7/18/2018  2:16 PM *    Specimens:                ID Type Source Tests Collected by Time   A : SUB-TOTAL GASTRECTOMY Tissue Stomach TISSUE PATHOLOGY EXAM Jean Cardozo MD 7/18/2018 1325         Drains:      Findings:     Complications: none      Jean Cardozo MD     Date: 7/18/2018  Time: 2:16 PM

## 2018-07-18 NOTE — PLAN OF CARE
Problem: Patient Care Overview  Goal: Plan of Care Review  Outcome: Ongoing (interventions implemented as appropriate)   07/18/18 1604   Coping/Psychosocial   Plan of Care Reviewed With patient   Plan of Care Review   Progress improving   OTHER   Outcome Summary pt is doing ok except nausea, on continuous IVF, encouraged to use IS and to walk       Problem: Bariatric Surgery (Adult,Pediatric)  Goal: Signs and Symptoms of Listed Potential Problems Will be Absent, Minimized or Managed (Bariatric Surgery)  Outcome: Ongoing (interventions implemented as appropriate)   07/18/18 1604   Goal/Outcome Evaluation   Problems Assessed (Bariatric Surgery) pain;postoperative nausea and vomiting   Problems Present (Bariatric Surgery) pain;postoperative nausea and vomiting

## 2018-07-19 ENCOUNTER — APPOINTMENT (OUTPATIENT)
Dept: GENERAL RADIOLOGY | Facility: HOSPITAL | Age: 38
End: 2018-07-19
Attending: SURGERY

## 2018-07-19 LAB
ALBUMIN SERPL-MCNC: 4.05 G/DL (ref 3.2–4.8)
ALBUMIN/GLOB SERPL: 1.4 G/DL (ref 1.5–2.5)
ALP SERPL-CCNC: 40 U/L (ref 25–100)
ALT SERPL W P-5'-P-CCNC: 56 U/L (ref 7–40)
ANION GAP SERPL CALCULATED.3IONS-SCNC: 8 MMOL/L (ref 3–11)
AST SERPL-CCNC: 28 U/L (ref 0–33)
BASOPHILS # BLD AUTO: 0 10*3/MM3 (ref 0–0.2)
BASOPHILS NFR BLD AUTO: 0 % (ref 0–1)
BILIRUB SERPL-MCNC: 0.9 MG/DL (ref 0.3–1.2)
BUN BLD-MCNC: 5 MG/DL (ref 9–23)
BUN/CREAT SERPL: 6.3 (ref 7–25)
CALCIUM SPEC-SCNC: 8.8 MG/DL (ref 8.7–10.4)
CHLORIDE SERPL-SCNC: 108 MMOL/L (ref 99–109)
CO2 SERPL-SCNC: 24 MMOL/L (ref 20–31)
CREAT BLD-MCNC: 0.8 MG/DL (ref 0.6–1.3)
DEPRECATED RDW RBC AUTO: 39.4 FL (ref 37–54)
EOSINOPHIL # BLD AUTO: 0 10*3/MM3 (ref 0–0.3)
EOSINOPHIL NFR BLD AUTO: 0 % (ref 0–3)
ERYTHROCYTE [DISTWIDTH] IN BLOOD BY AUTOMATED COUNT: 13.1 % (ref 11.3–14.5)
GFR SERPL CREATININE-BSD FRML MDRD: 99 ML/MIN/1.73
GLOBULIN UR ELPH-MCNC: 2.9 GM/DL
GLUCOSE BLD-MCNC: 164 MG/DL (ref 70–100)
GLUCOSE BLDC GLUCOMTR-MCNC: 121 MG/DL (ref 70–130)
GLUCOSE BLDC GLUCOMTR-MCNC: 124 MG/DL (ref 70–130)
GLUCOSE BLDC GLUCOMTR-MCNC: 126 MG/DL (ref 70–130)
GLUCOSE BLDC GLUCOMTR-MCNC: 133 MG/DL (ref 70–130)
GLUCOSE BLDC GLUCOMTR-MCNC: 155 MG/DL (ref 70–130)
HCT VFR BLD AUTO: 40 % (ref 34.5–44)
HGB BLD-MCNC: 13.4 G/DL (ref 11.5–15.5)
IMM GRANULOCYTES # BLD: 0.02 10*3/MM3 (ref 0–0.03)
IMM GRANULOCYTES NFR BLD: 0.2 % (ref 0–0.6)
IRON 24H UR-MRATE: 35 MCG/DL (ref 50–175)
LYMPHOCYTES # BLD AUTO: 1.64 10*3/MM3 (ref 0.6–4.8)
LYMPHOCYTES NFR BLD AUTO: 17 % (ref 24–44)
MCH RBC QN AUTO: 28.1 PG (ref 27–31)
MCHC RBC AUTO-ENTMCNC: 33.5 G/DL (ref 32–36)
MCV RBC AUTO: 83.9 FL (ref 80–99)
MONOCYTES # BLD AUTO: 0.87 10*3/MM3 (ref 0–1)
MONOCYTES NFR BLD AUTO: 9 % (ref 0–12)
NEUTROPHILS # BLD AUTO: 7.14 10*3/MM3 (ref 1.5–8.3)
NEUTROPHILS NFR BLD AUTO: 74 % (ref 41–71)
PLATELET # BLD AUTO: 224 10*3/MM3 (ref 150–450)
PMV BLD AUTO: 10 FL (ref 6–12)
POTASSIUM BLD-SCNC: 3.9 MMOL/L (ref 3.5–5.5)
PROT SERPL-MCNC: 6.9 G/DL (ref 5.7–8.2)
RBC # BLD AUTO: 4.77 10*6/MM3 (ref 3.89–5.14)
SODIUM BLD-SCNC: 140 MMOL/L (ref 132–146)
WBC NRBC COR # BLD: 9.65 10*3/MM3 (ref 3.5–10.8)

## 2018-07-19 PROCEDURE — 83540 ASSAY OF IRON: CPT | Performed by: SURGERY

## 2018-07-19 PROCEDURE — 80053 COMPREHEN METABOLIC PANEL: CPT | Performed by: SURGERY

## 2018-07-19 PROCEDURE — 25010000002 METOCLOPRAMIDE PER 10 MG: Performed by: SURGERY

## 2018-07-19 PROCEDURE — 25010000002 CYANOCOBALAMIN PER 1000 MCG: Performed by: SURGERY

## 2018-07-19 PROCEDURE — 25010000002 MORPHINE PER 10 MG: Performed by: SURGERY

## 2018-07-19 PROCEDURE — 25010000002 THIAMINE PER 100 MG: Performed by: SURGERY

## 2018-07-19 PROCEDURE — 25010000002 NA FERRIC GLUC CPLX PER 12.5 MG: Performed by: SURGERY

## 2018-07-19 PROCEDURE — 25010000002 ONDANSETRON PER 1 MG: Performed by: SURGERY

## 2018-07-19 PROCEDURE — 74241: CPT

## 2018-07-19 PROCEDURE — 0 DIATRIZOATE MEGLUMINE & SODIUM PER 1 ML: Performed by: SURGERY

## 2018-07-19 PROCEDURE — 82962 GLUCOSE BLOOD TEST: CPT

## 2018-07-19 PROCEDURE — 99024 POSTOP FOLLOW-UP VISIT: CPT | Performed by: SURGERY

## 2018-07-19 PROCEDURE — 25010000002 ENOXAPARIN PER 10 MG: Performed by: SURGERY

## 2018-07-19 PROCEDURE — 94799 UNLISTED PULMONARY SVC/PX: CPT

## 2018-07-19 PROCEDURE — 25010000003 CEFAZOLIN IN DEXTROSE 2-4 GM/100ML-% SOLUTION: Performed by: SURGERY

## 2018-07-19 PROCEDURE — 25810000003 POTASSIUM CHLORIDE PER 2 MEQ: Performed by: SURGERY

## 2018-07-19 PROCEDURE — 85025 COMPLETE CBC W/AUTO DIFF WBC: CPT | Performed by: SURGERY

## 2018-07-19 PROCEDURE — 25010000002 PROMETHAZINE PER 50 MG: Performed by: SURGERY

## 2018-07-19 RX ORDER — PANTOPRAZOLE SODIUM 40 MG/1
40 TABLET, DELAYED RELEASE ORAL
Status: DISCONTINUED | OUTPATIENT
Start: 2018-07-20 | End: 2018-07-21 | Stop reason: HOSPADM

## 2018-07-19 RX ADMIN — Medication 30 ML: at 09:07

## 2018-07-19 RX ADMIN — POTASSIUM CHLORIDE AND SODIUM CHLORIDE 125 ML/HR: 450; 150 INJECTION, SOLUTION INTRAVENOUS at 13:21

## 2018-07-19 RX ADMIN — PROMETHAZINE HYDROCHLORIDE 12.5 MG: 25 INJECTION INTRAMUSCULAR; INTRAVENOUS at 11:28

## 2018-07-19 RX ADMIN — SIMETHICONE CHEW TAB 80 MG 80 MG: 80 TABLET ORAL at 21:46

## 2018-07-19 RX ADMIN — CEFAZOLIN SODIUM 2 G: 2 INJECTION, SOLUTION INTRAVENOUS at 04:19

## 2018-07-19 RX ADMIN — ONDANSETRON 4 MG: 4 TABLET, FILM COATED ORAL at 21:50

## 2018-07-19 RX ADMIN — PANTOPRAZOLE SODIUM 40 MG: 40 INJECTION, POWDER, FOR SOLUTION INTRAVENOUS at 05:00

## 2018-07-19 RX ADMIN — SODIUM CHLORIDE 125 MG: 9 INJECTION, SOLUTION INTRAVENOUS at 17:20

## 2018-07-19 RX ADMIN — METOCLOPRAMIDE 10 MG: 5 INJECTION, SOLUTION INTRAMUSCULAR; INTRAVENOUS at 17:20

## 2018-07-19 RX ADMIN — METOCLOPRAMIDE 10 MG: 5 INJECTION, SOLUTION INTRAMUSCULAR; INTRAVENOUS at 00:26

## 2018-07-19 RX ADMIN — HYDROCODONE BITARTRATE AND ACETAMINOPHEN 1 TABLET: 7.5; 325 TABLET ORAL at 21:50

## 2018-07-19 RX ADMIN — ONDANSETRON 4 MG: 2 INJECTION INTRAMUSCULAR; INTRAVENOUS at 13:18

## 2018-07-19 RX ADMIN — ONDANSETRON 4 MG: 2 INJECTION INTRAMUSCULAR; INTRAVENOUS at 04:46

## 2018-07-19 RX ADMIN — FOLIC ACID 250 ML/HR: 5 INJECTION, SOLUTION INTRAMUSCULAR; INTRAVENOUS; SUBCUTANEOUS at 08:35

## 2018-07-19 RX ADMIN — HYDROMORPHONE HYDROCHLORIDE 2 MG: 2 TABLET ORAL at 04:18

## 2018-07-19 RX ADMIN — CYANOCOBALAMIN 1000 MCG: 1000 INJECTION, SOLUTION INTRAMUSCULAR; SUBCUTANEOUS at 08:35

## 2018-07-19 RX ADMIN — METOCLOPRAMIDE 10 MG: 5 INJECTION, SOLUTION INTRAMUSCULAR; INTRAVENOUS at 08:35

## 2018-07-19 RX ADMIN — PROMETHAZINE HYDROCHLORIDE 12.5 MG: 25 INJECTION INTRAMUSCULAR; INTRAVENOUS at 04:19

## 2018-07-19 RX ADMIN — POTASSIUM CHLORIDE AND SODIUM CHLORIDE 125 ML/HR: 450; 150 INJECTION, SOLUTION INTRAVENOUS at 22:29

## 2018-07-19 RX ADMIN — ENOXAPARIN SODIUM 40 MG: 40 INJECTION SUBCUTANEOUS at 17:29

## 2018-07-19 RX ADMIN — ENOXAPARIN SODIUM 40 MG: 40 INJECTION SUBCUTANEOUS at 05:00

## 2018-07-19 RX ADMIN — MORPHINE SULFATE 4 MG: 4 INJECTION, SOLUTION INTRAMUSCULAR; INTRAVENOUS at 17:19

## 2018-07-19 NOTE — PLAN OF CARE
Problem: Patient Care Overview  Goal: Plan of Care Review  Outcome: Ongoing (interventions implemented as appropriate)   07/19/18 0237   Coping/Psychosocial   Plan of Care Reviewed With patient   Plan of Care Review   Progress improving       Problem: Bariatric Surgery (Adult,Pediatric)  Goal: Signs and Symptoms of Listed Potential Problems Will be Absent, Minimized or Managed (Bariatric Surgery)  Outcome: Ongoing (interventions implemented as appropriate)   07/19/18 0237   Goal/Outcome Evaluation   Problems Assessed (Bariatric Surgery) all   Problems Present (Bariatric Surgery) postoperative nausea and vomiting;pain

## 2018-07-19 NOTE — PLAN OF CARE
Problem: Patient Care Overview  Goal: Individualization and Mutuality  Outcome: Ongoing (interventions implemented as appropriate)   07/19/18 1647   Individualization   Patient Specific Goals (Include Timeframe) Tolerate po intake without N/V   Patient Specific Interventions Administer antiemetics prn       Problem: Bariatric Surgery (Adult,Pediatric)  Goal: Signs and Symptoms of Listed Potential Problems Will be Absent, Minimized or Managed (Bariatric Surgery)  Outcome: Ongoing (interventions implemented as appropriate)   07/19/18 5460   Goal/Outcome Evaluation   Problems Assessed (Bariatric Surgery) situational response   Problems Present (Bariatric Surgery) situational response

## 2018-07-19 NOTE — PROGRESS NOTES
Cc:  POD#1  She is alone in the room.  Complains of severe nausea and inability to keep down anything by mouth.  Other than that no complaints.  She is ambulating and voiding well.  No pulmonary complaints.  No bowel movement or flatus.  Fever or tachycardia pulse 88 blood pressure 125/77 she is no apparent distress abdomen is soft, nontender, nondistended, bowel sounds are hypoactive.  Wounds look okay    CMP normal except for glucose of 164 BUNs 5 SGPT 56 iron is low at 35 white blood count normal at 9.7 with 74 segs 17 lymphs 9 monocytes H&H 13.4 and 40.0 balloon A1c 8.1 0 GI unremarkable    Impression: Severe nausea and inability to keep down oral intake.  Poorly controlled diabetes mellitus.  Iron deficiency without evidence of bleeding on Lovenox.    Plan: Continue liquids, out of bed, pulmonary toilet, VTE proph. IV iron. Anti-emetics prn. See orders

## 2018-07-20 LAB
ALBUMIN SERPL-MCNC: 3.85 G/DL (ref 3.2–4.8)
ALBUMIN/GLOB SERPL: 1.4 G/DL (ref 1.5–2.5)
ALP SERPL-CCNC: 38 U/L (ref 25–100)
ALT SERPL W P-5'-P-CCNC: 43 U/L (ref 7–40)
ANION GAP SERPL CALCULATED.3IONS-SCNC: 11 MMOL/L (ref 3–11)
AST SERPL-CCNC: 31 U/L (ref 0–33)
BASOPHILS # BLD AUTO: 0.01 10*3/MM3 (ref 0–0.2)
BASOPHILS NFR BLD AUTO: 0.1 % (ref 0–1)
BILIRUB SERPL-MCNC: 1.2 MG/DL (ref 0.3–1.2)
BUN BLD-MCNC: 5 MG/DL (ref 9–23)
BUN/CREAT SERPL: 7 (ref 7–25)
CALCIUM SPEC-SCNC: 8.7 MG/DL (ref 8.7–10.4)
CHLORIDE SERPL-SCNC: 107 MMOL/L (ref 99–109)
CO2 SERPL-SCNC: 22 MMOL/L (ref 20–31)
CREAT BLD-MCNC: 0.71 MG/DL (ref 0.6–1.3)
CYTO UR: NORMAL
DEPRECATED RDW RBC AUTO: 40.9 FL (ref 37–54)
EOSINOPHIL # BLD AUTO: 0.03 10*3/MM3 (ref 0–0.3)
EOSINOPHIL NFR BLD AUTO: 0.3 % (ref 0–3)
ERYTHROCYTE [DISTWIDTH] IN BLOOD BY AUTOMATED COUNT: 13.5 % (ref 11.3–14.5)
GFR SERPL CREATININE-BSD FRML MDRD: 114 ML/MIN/1.73
GLOBULIN UR ELPH-MCNC: 2.8 GM/DL
GLUCOSE BLD-MCNC: 100 MG/DL (ref 70–100)
GLUCOSE BLDC GLUCOMTR-MCNC: 110 MG/DL (ref 70–130)
GLUCOSE BLDC GLUCOMTR-MCNC: 114 MG/DL (ref 70–130)
GLUCOSE BLDC GLUCOMTR-MCNC: 81 MG/DL (ref 70–130)
GLUCOSE BLDC GLUCOMTR-MCNC: 92 MG/DL (ref 70–130)
HCT VFR BLD AUTO: 38.1 % (ref 34.5–44)
HGB BLD-MCNC: 12.7 G/DL (ref 11.5–15.5)
IMM GRANULOCYTES # BLD: 0.04 10*3/MM3 (ref 0–0.03)
IMM GRANULOCYTES NFR BLD: 0.4 % (ref 0–0.6)
LAB AP CASE REPORT: NORMAL
LAB AP CLINICAL INFORMATION: NORMAL
LYMPHOCYTES # BLD AUTO: 3.96 10*3/MM3 (ref 0.6–4.8)
LYMPHOCYTES NFR BLD AUTO: 37.7 % (ref 24–44)
MCH RBC QN AUTO: 28.2 PG (ref 27–31)
MCHC RBC AUTO-ENTMCNC: 33.3 G/DL (ref 32–36)
MCV RBC AUTO: 84.5 FL (ref 80–99)
MONOCYTES # BLD AUTO: 0.84 10*3/MM3 (ref 0–1)
MONOCYTES NFR BLD AUTO: 8 % (ref 0–12)
NEUTROPHILS # BLD AUTO: 5.66 10*3/MM3 (ref 1.5–8.3)
NEUTROPHILS NFR BLD AUTO: 53.9 % (ref 41–71)
PATH REPORT.FINAL DX SPEC: NORMAL
PATH REPORT.GROSS SPEC: NORMAL
PLATELET # BLD AUTO: 218 10*3/MM3 (ref 150–450)
PMV BLD AUTO: 10.3 FL (ref 6–12)
POTASSIUM BLD-SCNC: 3.8 MMOL/L (ref 3.5–5.5)
PROT SERPL-MCNC: 6.6 G/DL (ref 5.7–8.2)
RBC # BLD AUTO: 4.51 10*6/MM3 (ref 3.89–5.14)
SODIUM BLD-SCNC: 140 MMOL/L (ref 132–146)
WBC NRBC COR # BLD: 10.5 10*3/MM3 (ref 3.5–10.8)

## 2018-07-20 PROCEDURE — 25010000002 ENOXAPARIN PER 10 MG: Performed by: SURGERY

## 2018-07-20 PROCEDURE — 94799 UNLISTED PULMONARY SVC/PX: CPT

## 2018-07-20 PROCEDURE — 25010000002 METOCLOPRAMIDE PER 10 MG: Performed by: SURGERY

## 2018-07-20 PROCEDURE — 80053 COMPREHEN METABOLIC PANEL: CPT | Performed by: SURGERY

## 2018-07-20 PROCEDURE — 99024 POSTOP FOLLOW-UP VISIT: CPT | Performed by: SURGERY

## 2018-07-20 PROCEDURE — 82962 GLUCOSE BLOOD TEST: CPT

## 2018-07-20 PROCEDURE — 85025 COMPLETE CBC W/AUTO DIFF WBC: CPT | Performed by: SURGERY

## 2018-07-20 RX ADMIN — HYDROCODONE BITARTRATE AND ACETAMINOPHEN 1 TABLET: 7.5; 325 TABLET ORAL at 20:12

## 2018-07-20 RX ADMIN — HYDROMORPHONE HYDROCHLORIDE 2 MG: 2 TABLET ORAL at 23:04

## 2018-07-20 RX ADMIN — ONDANSETRON 4 MG: 4 TABLET, FILM COATED ORAL at 20:12

## 2018-07-20 RX ADMIN — SIMETHICONE CHEW TAB 80 MG 80 MG: 80 TABLET ORAL at 23:04

## 2018-07-20 RX ADMIN — ENOXAPARIN SODIUM 40 MG: 40 INJECTION SUBCUTANEOUS at 20:11

## 2018-07-20 RX ADMIN — PANTOPRAZOLE SODIUM 40 MG: 40 TABLET, DELAYED RELEASE ORAL at 06:37

## 2018-07-20 RX ADMIN — METOCLOPRAMIDE 10 MG: 5 INJECTION, SOLUTION INTRAMUSCULAR; INTRAVENOUS at 08:46

## 2018-07-20 RX ADMIN — ENOXAPARIN SODIUM 40 MG: 40 INJECTION SUBCUTANEOUS at 06:37

## 2018-07-20 RX ADMIN — SIMETHICONE CHEW TAB 80 MG 80 MG: 80 TABLET ORAL at 08:46

## 2018-07-20 NOTE — PLAN OF CARE
Problem: Patient Care Overview  Goal: Individualization and Mutuality  Outcome: Ongoing (interventions implemented as appropriate)      Problem: Bariatric Surgery (Adult,Pediatric)  Goal: Signs and Symptoms of Listed Potential Problems Will be Absent, Minimized or Managed (Bariatric Surgery)  Outcome: Ongoing (interventions implemented as appropriate)   07/20/18 5645   Goal/Outcome Evaluation   Problems Assessed (Bariatric Surgery) situational response   Problems Present (Bariatric Surgery) situational response

## 2018-07-20 NOTE — PROGRESS NOTES
Discharge Planning Assessment  [facility]     Patient Name: Carlos Heller  MRN: [id number]  Today's Date: 7/20/2018    Admit Date: 7/18/2018          Discharge Needs Assessment     Row Name 07/20/18 1612       Living Environment    Lives With significant other;child(yelitza), dependent    Current Living Arrangements home/apartment/condo    Primary Care Provided by self    Provides Primary Care For child(yelitza)    Family Caregiver if Needed significant other    Quality of Family Relationships supportive       Resource/Environmental Concerns    Transportation Concerns car, none       Transition Planning    Patient/Family Anticipates Transition to home with family    Patient/Family Anticipated Services at Transition     Transportation Anticipated family or friend will provide       Discharge Needs Assessment    Readmission Within the Last 30 Days no previous admission in last 30 days    Concerns to be Addressed no discharge needs identified;denies needs/concerns at this time    Equipment Currently Used at Home bipap/cpap   [employer]    Anticipated Changes Related to Illness none    Equipment Needed After Discharge none            Discharge Plan     Row Name 07/20/18 6907       Plan    Plan HOME    Patient/Family in Agreement with Plan yes    Plan Comments Met with pt at bedside.  She resides with her boyfriend and 3 children in [facility].  She is independent with ADLs.  She has a C-PAP provided by [employer].  No HH services.  Confirmed she has [employer] with rx coverage.  Goal is to return home upon DC.  No immediate needs identified/voiced.  CM will cont to follow.    Final Discharge Disposition Code 01 - home or self-care        Destination     No service coordination in this encounter.      Durable Medical Equipment     No service coordination in this encounter.      Dialysis/Infusion     No service coordination in this encounter.      Home Medical Care     No service  coordination in this encounter.      Social Care     No service coordination in this encounter.                Demographic Summary     Row Name 07/20/18 1612       General Information    Admission Type inpatient    Referral Source admission list    Reason for Consult discharge planning    Preferred Language English       Contact Information    Permission Granted to Share Info With     Contact Information Obtained for             Functional Status     Row Name 07/20/18 1612       Functional Status    Usual Activity Tolerance good       Functional Status, IADL    Medications independent    Meal Preparation independent    Housekeeping independent    Laundry independent    Shopping independent            Psychosocial    No documentation.           Abuse/Neglect    No documentation.           Legal    No documentation.           Substance Abuse    No documentation.           Patient Forms    No documentation.         Darcy Steele

## 2018-07-20 NOTE — PROGRESS NOTES
Cc: POD#1  She is alone in the room.  She says she feels much better but still a lot of nausea and poor oral intake.  She is passing flatus.  No fever or tachycardia.  She is ambulating and voiding well.  No pulmonary complaints abdomen is soft and benign bowel sounds are active.  Wounds look okay  labs unremarkable    Impression: Doing okay except for nausea and poor oral intake    Plan: Continue in-hospital obs until able to adequately maintain hydration po, indra w super MO.  Cont liquids, OOB, PT, VTE proph.  Suspect home tomorrow

## 2018-07-21 VITALS
HEART RATE: 81 BPM | RESPIRATION RATE: 16 BRPM | SYSTOLIC BLOOD PRESSURE: 99 MMHG | DIASTOLIC BLOOD PRESSURE: 54 MMHG | WEIGHT: 293 LBS | HEIGHT: 65 IN | BODY MASS INDEX: 48.82 KG/M2 | OXYGEN SATURATION: 98 % | TEMPERATURE: 98.5 F

## 2018-07-21 LAB
GLUCOSE BLDC GLUCOMTR-MCNC: 72 MG/DL (ref 70–130)
GLUCOSE BLDC GLUCOMTR-MCNC: 75 MG/DL (ref 70–130)
GLUCOSE BLDC GLUCOMTR-MCNC: 85 MG/DL (ref 70–130)

## 2018-07-21 PROCEDURE — 99024 POSTOP FOLLOW-UP VISIT: CPT | Performed by: SURGERY

## 2018-07-21 PROCEDURE — 94799 UNLISTED PULMONARY SVC/PX: CPT

## 2018-07-21 PROCEDURE — 82962 GLUCOSE BLOOD TEST: CPT

## 2018-07-21 PROCEDURE — 25010000002 ENOXAPARIN PER 10 MG: Performed by: SURGERY

## 2018-07-21 RX ORDER — PROMETHAZINE HYDROCHLORIDE 12.5 MG/1
12.5 TABLET ORAL EVERY 4 HOURS PRN
Qty: 20 TABLET | Refills: 0 | Status: SHIPPED | OUTPATIENT
Start: 2018-07-21 | End: 2021-08-05

## 2018-07-21 RX ORDER — HYDROCODONE BITARTRATE AND ACETAMINOPHEN 7.5; 325 MG/1; MG/1
1 TABLET ORAL EVERY 4 HOURS PRN
Qty: 30 TABLET | Refills: 0 | Status: SHIPPED | OUTPATIENT
Start: 2018-07-21 | End: 2018-07-31

## 2018-07-21 RX ORDER — OMEPRAZOLE 20 MG/1
40 CAPSULE, DELAYED RELEASE ORAL 2 TIMES DAILY PRN
Qty: 60 CAPSULE | Refills: 0 | Status: SHIPPED | OUTPATIENT
Start: 2018-07-21 | End: 2018-07-25

## 2018-07-21 RX ORDER — ONDANSETRON 4 MG/1
4 TABLET, FILM COATED ORAL EVERY 4 HOURS PRN
Qty: 20 TABLET | Refills: 0 | Status: SHIPPED | OUTPATIENT
Start: 2018-07-21 | End: 2021-08-05

## 2018-07-21 RX ADMIN — ENOXAPARIN SODIUM 40 MG: 40 INJECTION SUBCUTANEOUS at 06:03

## 2018-07-21 RX ADMIN — PANTOPRAZOLE SODIUM 40 MG: 40 TABLET, DELAYED RELEASE ORAL at 06:04

## 2018-07-21 RX ADMIN — SIMETHICONE CHEW TAB 80 MG 80 MG: 80 TABLET ORAL at 11:27

## 2018-07-21 RX ADMIN — ENOXAPARIN SODIUM 40 MG: 40 INJECTION SUBCUTANEOUS at 17:12

## 2018-07-21 NOTE — PROGRESS NOTES
Cc: POD#3  She finally feels better and wants to go home her nausea is much improved.  She's passing flatus and had a bowel movement.  She is ambulating and voiding well.  No pulmonary complaints.  No fever or tachycardia pulse 81 blood pressure 99/54 she is no apparent distress abdomen is soft and benign bowel sounds are active wounds look okay    Impression: Doing okay    Plan: Discharge home.  Discharge instructions were discussed.  She says she has her Eliquis at home.  See orders

## 2018-07-21 NOTE — PLAN OF CARE
Problem: Bariatric Surgery (Adult,Pediatric)  Goal: Signs and Symptoms of Listed Potential Problems Will be Absent, Minimized or Managed (Bariatric Surgery)   07/21/18 0632   Goal/Outcome Evaluation   Problems Assessed (Bariatric Surgery) all;bowel motility decreased;wound healing impaired   Problems Present (Bariatric Surgery) none;pain;respiratory compromise

## 2018-07-22 NOTE — DISCHARGE SUMMARY
Admission Diagnosis:   Super Morbid Obesity with Multiple Co-morbidities    Discharge Diagnosis:  Same    Principal Procedure Performed:   Laparoscopic sleeve gastrectomy,  EGD    Other procedures performed: Upper GI    Complications: None    Consultations: None    History of present illness:  This is a 38-year-old super morbidly obese patient who presents for elective laparoscopic sleeve gastrectomy.  The preoperative testing and evaluation is in order and the patient is admitted for elective surgery.    Hospital Course:  The patient was admitted and underwent uneventful surgery as described.  Postoperatively the patient was transferred to the bariatric telemetry unit where the postoperative course was uneventful.  Upper GI on postoperative day #1 was unremarkable.    She had a lot of nauea and poor po intake that took until the 3rd post op day to resolve.  At the time of discharge on postoperative day #3 the patient is tolerating a diet and pain is controlled with oral medication.  The patient is afebrile and abdominal exam is appropriate, wounds look okay.  The patient is discharged home in good condition.  Discharge instructions were discussed.  The medication reconciliation has been completed.  The patient is to follow-up in 1-2 weeks in the office.  Eliquis post op.

## 2018-07-23 NOTE — PAYOR COMM NOTE
"Carlos Martinez (38 y.o. Female)     Date of Birth Social Security Number Address Home Phone MRN    1980  [address] [place] [address] [phone] [id number]    Episcopal Marital Status          Zoroastrianism Single       Admission Date Admission Type Admitting Provider Attending Provider Department, Room/Bed    7/18/18 Elective Jean Cardozo MD  [address], S[address]/1    Discharge Date Discharge Disposition Discharge Destination        7/21/2018 Home or Self Care              Attending Provider:  (none)   Allergies:  Influenza Vaccines, Wellbutrin [Bupropion]    Isolation:  None   Infection:  None   Code Status:  Prior    Ht:  163.8 cm (64.5\")   Wt:  152 kg (334 lb 0.2 oz)    Admission Cmt:  None   Principal Problem:  Morbid obesity with body mass index (BMI) of 50.0 to 59.9 in adult (CMS/[facility]) [E66.01,Z68.43] More...                 Active Insurance as of 7/18/2018     Primary Coverage     Payor Plan Insurance Group Employer/Plan Group    [employer] MEDICAID [employer] [facility]     Payor Plan Address Payor Plan Phone Number Effective From Effective To    PO  [phone] 10/9/2016     [facility] [id number]       Subscriber Name Subscriber Birth Date Member ID       CARLOS MARTINEZ 1980 [id number]                 Emergency Contacts      (Rel.) Home Phone Work Phone Mobile Phone    Gabi Martinez (Mother) [phone] -- --        Jean Cardozo MD Physician Signed Surgery  Progress Notes Date of Service: 7/20/2018  5:02 PM         []Manual[]Template  []Copied  Cc: POD#1  She is alone in the room.  She says she feels much better but still a lot of nausea and poor oral intake.  She is passing flatus.  No fever or tachycardia.  She is ambulating and voiding well.  No pulmonary complaints abdomen is soft and benign bowel sounds are active.  Wounds look okay  labs unremarkable     Impression: Doing okay except for nausea and poor oral " intake     Plan: Continue in-hospital obs until able to adequately maintain hydration po, indra w super MO.  Cont liquids, OOB, PT, VTE proph.  Suspect home tomorrow          Jean Cardozo MD Physician Signed Surgery  Progress Notes Date of Service: 7/21/2018  6:11 PM         []Manual[]Template  []Copied  Cc: POD#3  She finally feels better and wants to go home her nausea is much improved.  She's passing flatus and had a bowel movement.  She is ambulating and voiding well.  No pulmonary complaints.  No fever or tachycardia pulse 81 blood pressure 99/54 she is no apparent distress abdomen is soft and benign bowel sounds are active wounds look okay     Impression: Doing okay     Plan: Discharge home.  Discharge instructions were discussed.  She says she has her Eliquis at home.  See orders             Discharge Summary      Jean Cardozo MD at 7/21/2018  6:42 PM        Admission Diagnosis:   Super Morbid Obesity with Multiple Co-morbidities    Discharge Diagnosis:  Same    Principal Procedure Performed:   Laparoscopic sleeve gastrectomy,  EGD    Other procedures performed: Upper GI    Complications: None    Consultations: None    History of present illness:  This is a 38-year-old super morbidly obese patient who presents for elective laparoscopic sleeve gastrectomy.  The preoperative testing and evaluation is in order and the patient is admitted for elective surgery.    Hospital Course:  The patient was admitted and underwent uneventful surgery as described.  Postoperatively the patient was transferred to the bariatric telemetry unit where the postoperative course was uneventful.  Upper GI on postoperative day #1 was unremarkable.    She had a lot of nauea and poor po intake that took until the 3rd post op day to resolve.  At the time of discharge on postoperative day #3 the patient is tolerating a diet and pain is controlled with oral medication.  The patient is afebrile and abdominal exam is appropriate,  wounds look okay.  The patient is discharged home in good condition.  Discharge instructions were discussed.  The medication reconciliation has been completed.  The patient is to follow-up in 1-2 weeks in the office.  Eliquis post op.        Electronically signed by Jean Cardozo MD at 7/22/2018  3:29 PM

## 2018-07-25 ENCOUNTER — OFFICE VISIT (OUTPATIENT)
Dept: BARIATRICS/WEIGHT MGMT | Facility: CLINIC | Age: 38
End: 2018-07-25

## 2018-07-25 VITALS
DIASTOLIC BLOOD PRESSURE: 72 MMHG | SYSTOLIC BLOOD PRESSURE: 118 MMHG | RESPIRATION RATE: 18 BRPM | HEART RATE: 73 BPM | HEIGHT: 65 IN | BODY MASS INDEX: 48.82 KG/M2 | TEMPERATURE: 97.9 F | OXYGEN SATURATION: 99 % | WEIGHT: 293 LBS

## 2018-07-25 DIAGNOSIS — M25.50 ARTHRALGIA, UNSPECIFIED JOINT: ICD-10-CM

## 2018-07-25 DIAGNOSIS — E78.5 HYPERLIPIDEMIA, UNSPECIFIED HYPERLIPIDEMIA TYPE: ICD-10-CM

## 2018-07-25 DIAGNOSIS — G47.30 SLEEP APNEA, UNSPECIFIED TYPE: ICD-10-CM

## 2018-07-25 DIAGNOSIS — R53.83 FATIGUE, UNSPECIFIED TYPE: Primary | ICD-10-CM

## 2018-07-25 DIAGNOSIS — E11.8 TYPE 2 DIABETES MELLITUS WITH COMPLICATION, WITHOUT LONG-TERM CURRENT USE OF INSULIN (HCC): ICD-10-CM

## 2018-07-25 DIAGNOSIS — M79.7 FIBROMYALGIA: ICD-10-CM

## 2018-07-25 DIAGNOSIS — E11.9 TYPE 2 DIABETES MELLITUS WITHOUT COMPLICATION, WITHOUT LONG-TERM CURRENT USE OF INSULIN (HCC): ICD-10-CM

## 2018-07-25 DIAGNOSIS — K21.9 GASTROESOPHAGEAL REFLUX DISEASE, ESOPHAGITIS PRESENCE NOT SPECIFIED: ICD-10-CM

## 2018-07-25 DIAGNOSIS — E66.01 MORBID OBESITY WITH BODY MASS INDEX (BMI) OF 50.0 TO 59.9 IN ADULT (HCC): ICD-10-CM

## 2018-07-25 PROCEDURE — 99024 POSTOP FOLLOW-UP VISIT: CPT | Performed by: SURGERY

## 2018-07-25 RX ORDER — URSODIOL 300 MG/1
300 CAPSULE ORAL 2 TIMES DAILY
Qty: 60 CAPSULE | Refills: 5 | Status: SHIPPED | OUTPATIENT
Start: 2018-07-25 | End: 2018-08-24

## 2018-07-25 RX ORDER — OMEPRAZOLE 40 MG/1
40 CAPSULE, DELAYED RELEASE ORAL DAILY
Qty: 60 CAPSULE | Refills: 0 | Status: SHIPPED | OUTPATIENT
Start: 2018-07-25 | End: 2018-09-17 | Stop reason: SDUPTHER

## 2018-07-25 NOTE — PROGRESS NOTES
[facility] Bariatric Surgery  [address]  [facility] [id number] [phone]      Patient Name:  Carlos Heller.  :  1980      Date of Visit: 2018      Reason for Visit:  POD #7    HPI:  Carlos Heller is a 38 y.o. female s/p 18 LSG by Dr. Cardozo.    Doing well.  No major issues/concerns. Denies reflux, nausea, vomiting, abdominal pain, pulmonary issues and fevers.  Feels dysphagia to liquids.  Only on 20 mg bid of omeprazole.  Tolerating diet progression - on stage 1.  Getting 10g prot/day.  Reports this is difficult due to dysphagia.  Drinking 16 fluid oz/day.  Can tolerate some Jello.  Taking MVI, B12, Calcium, Vit D, iron and Vit C.  Not taking B1 b/c couldn't find at [facility].  On Omeprazole .  Holding ASA , NSAIDs  and Steroids.  Ambulating.     Presurgery weight: 334 pounds.  Today's weight is (!) 140 kg (309 lb 8 oz) pounds, today's  Body mass index is 52.31 kg/m²., and her weight loss since surgery is 25 pounds.       Path reviewed with patient:  Final Diagnosis    STOMACH, SUBTOTAL GASTRECTOMY:  No histopathologic abnormality.         Past Medical History:   Diagnosis Date   • Carpal tunnel syndrome     NSAIDS daily, also gets steroid injection of right hand, last2018   • Depression    • Elevated ALT measurement    • Fatigue    • Fibromyalgia     patient thinks related to obesity   • Fibromyalgia    • Former smoker    • GERD (gastroesophageal reflux disease)     omeprazole BID and tums prn.  EGD GDW  40 cm, no HH, path sugg IGNACIO. serum h. pyl neg   • GERD (gastroesophageal reflux disease)    • Hx MRSA infection     on (L) leg, treated w/ I&D, IV abx.      • Hyperlipidemia    • Joint pain     back, shoulder, hips. Recented Rx mobic. Steroid Injection of  L knee 3/2018   • Morbid obesity (CMS/HCC)    • ADDI (obstructive sleep apnea)     semi compliant with CPAP   • Psoriasis of scalp    • Pulmonary embolism (CMS/HCC)     unknown  etiology, tx w/ coumadin   • Spinal headache    • Type 2 diabetes mellitus (CMS/HCC)     dx , follows w/ Endocrinology, on insulin since 2017 checks sugar once a week   • Wears glasses    • Wears glasses      Past Surgical History:   Procedure Laterality Date   • ABDOMINAL HYSTERECTOMY      d/t postpartum bleeding  with ,  partial    • BILATERAL BREAST REDUCTION     • CARPAL TUNNEL RELEASE Left    •  SECTION  , 2009    x2, horizontal   • ENDOSCOPY N/A 2017    Procedure: ESOPHAGOGASTRODUODENOSCOPY;  Surgeon: Jean Cardozo MD;  [facility]:  [facility];  Service:    • ENDOSCOPY N/A 2018    Procedure: ESOPHAGOGASTRODUODENOSCOPY;  Surgeon: Jean Cardozo MD;  [facility]:  [facility];  Service: Bariatric   • GASTRIC SLEEVE LAPAROSCOPIC N/A 2018    Procedure: GASTRIC SLEEVE LAPAROSCOPICHIATAL;  Surgeon: Jean Cardozo MD;  [facility]:  [facility];  Service: Bariatric   • LAPAROSCOPIC SALPINGOOPHERECTOMY      for tubal pregnancy   • OTHER SURGICAL HISTORY      denies anesthesia issues   • WISDOM TOOTH EXTRACTION       Outpatient Prescriptions Marked as Taking for the 18 encounter (Office Visit) with Roseline Shannon MD   Medication Sig Dispense Refill   • HYDROcodone-acetaminophen (NORCO) 7.5-325 MG per tablet Take 1 tablet by mouth Every 4 (Four) Hours As Needed for Moderate Pain  for up to 10 days. 30 tablet 0   • ondansetron (ZOFRAN) 4 MG tablet Take 1 tablet by mouth Every 4 (Four) Hours As Needed for Nausea. 20 tablet 0   • promethazine (PHENERGAN) 12.5 MG tablet Take 1 tablet by mouth Every 4 (Four) Hours As Needed for Nausea. 20 tablet 0   • [DISCONTINUED] omeprazole (priLOSEC) 20 MG capsule Take 2 capsules by mouth 2 (Two) Times a Day As Needed (heartburn). 60 capsule 0     Allergies   Allergen Reactions   • Influenza Vaccines GI Intolerance   • Wellbutrin [Bupropion] Palpitations       Social History     Social History   • Marital  "status: Single     Spouse name: N/A   • Number of children: 3   • Years of education: GED     Occupational History   • Caregiver w/ [employer]      Social History Main Topics   • Smoking status: Former Smoker     Packs/day: 0.50     Years: 20.00     Types: Cigarettes     Quit date: 01/2017   • Smokeless tobacco: Never Used   • Alcohol use No   • Drug use: No   • Sexual activity: Defer      Comment: no hormones     Other Topics Concern   • Not on file     Social History Narrative    Lives in [place], [place] w/ 3 children.  Works as caregiver.        /72 (BP [facility]: Left arm, Patient Position: Sitting, Cuff Size: Large Adult)   Pulse 73   Temp 97.9 °F (36.6 °C) (Temporal Artery )   Resp 18   Ht 163.8 cm (64.5\")   Wt (!) 140 kg (309 lb 8 oz)   SpO2 99%   BMI 52.31 kg/m²   Physical Exam   Constitutional: She is oriented to person, place, and time. She appears well-developed and well-nourished. No distress.   HENT:   Head: Normocephalic and atraumatic.   Mouth/Throat: No oropharyngeal exudate.   Eyes: Pupils are equal, round, and reactive to light. Conjunctivae and EOM are normal.   Pulmonary/Chest: Effort normal. No respiratory distress.   Abdominal: Soft. She exhibits no distension.   Neurological: She is alert and oriented to person, place, and time. No cranial nerve deficit. Coordination normal.   Skin: Skin is warm and dry. No rash noted. She is not diaphoretic. No erythema.   Psychiatric: She has a normal mood and affect. Her behavior is normal. Judgment and thought content normal.         Assessment:   POD # 7      Plan:  Doing well. Continue to advance diet per manual.  Increase protein intake to 100g/day--reeducated on importance of protein and water, risk for leak or dehydration.  She is going to [facility] today to get Isopure.  Reminded NO Genepro.  Actigall Rx given, omeprazole 40 mg daily rx.  Increase exercise/activity as tolerated.  Reviewed lifting restrictions, nothing >25 lbs x 2 more weeks.  " Continue vitamins.  Continue to avoid ASA/NSAIDs/Steroids x 6 weeks postop.  Call w/ problems/concerns.    The patient was instructed to follow up in 3 weeks, sooner if needed.     Roseline Shannon MD

## 2018-07-25 NOTE — PAYOR COMM NOTE
"Carlos Martinez (38 y.o. Female)     Date of Birth Social Security Number Address Home Phone MRN    1980  [address] [place] [address] [phone] [id number]    Tenriism Marital Status          Jainism Single       Admission Date Admission Type Admitting Provider Attending Provider Department, Room/Bed    7/18/18 Elective Jean Cardozo MD  [address], S[address]/1    Discharge Date Discharge Disposition Discharge Destination        7/21/2018 Home or Self Care              Attending Provider:  (none)   Allergies:  Influenza Vaccines, Wellbutrin [Bupropion]    Isolation:  None   Infection:  None   Code Status:  Prior    Ht:  163.8 cm (64.5\")   Wt:  152 kg (334 lb 0.2 oz)    Admission Cmt:  None   Principal Problem:  Morbid obesity with body mass index (BMI) of 50.0 to 59.9 in adult (CMS/[facility]) [E66.01,Z68.43] More...                 Active Insurance as of 7/18/2018     Primary Coverage     Payor Plan Insurance Group Employer/Plan Group    [employer]NA MEDICAID [employer] [facility]     Payor Plan Address Payor Plan Phone Number Effective From Effective To    PO  [phone] 10/9/2016     [facility] [id number]       Subscriber Name Subscriber Birth Date Member ID       CARLOS MARTINEZ 1980 [id number]                 Emergency Contacts      (Rel.) Home Phone Work Phone Mobile Phone    Gabi Martinez (Mother) [phone] -- --               Discharge Summary      Jean Cardozo MD at 7/21/2018  6:42 PM        Admission Diagnosis:   Super Morbid Obesity with Multiple Co-morbidities    Discharge Diagnosis:  Same    Principal Procedure Performed:   Laparoscopic sleeve gastrectomy,  EGD    Other procedures performed: Upper GI    Complications: None    Consultations: None    History of present illness:  This is a 38-year-old super morbidly obese patient who presents for elective laparoscopic sleeve gastrectomy.  The preoperative testing and " evaluation is in order and the patient is admitted for elective surgery.    Hospital Course:  The patient was admitted and underwent uneventful surgery as described.  Postoperatively the patient was transferred to the bariatric telemetry unit where the postoperative course was uneventful.  Upper GI on postoperative day #1 was unremarkable.    She had a lot of nauea and poor po intake that took until the 3rd post op day to resolve.  At the time of discharge on postoperative day #3 the patient is tolerating a diet and pain is controlled with oral medication.  The patient is afebrile and abdominal exam is appropriate, wounds look okay.  The patient is discharged home in good condition.  Discharge instructions were discussed.  The medication reconciliation has been completed.  The patient is to follow-up in 1-2 weeks in the office.  Eliquis post op.        Electronically signed by Jean Cardozo MD at 7/22/2018  3:29 PM

## 2018-07-30 RX ORDER — APIXABAN 2.5 MG/1
2.5 TABLET, FILM COATED ORAL EVERY 12 HOURS SCHEDULED
Qty: 42 TABLET | Refills: 0 | OUTPATIENT
Start: 2018-07-30 | End: 2018-08-20

## 2018-08-29 ENCOUNTER — OFFICE VISIT (OUTPATIENT)
Dept: BARIATRICS/WEIGHT MGMT | Facility: CLINIC | Age: 38
End: 2018-08-29

## 2018-08-29 VITALS
HEIGHT: 65 IN | TEMPERATURE: 98.1 F | HEART RATE: 86 BPM | RESPIRATION RATE: 18 BRPM | DIASTOLIC BLOOD PRESSURE: 84 MMHG | SYSTOLIC BLOOD PRESSURE: 122 MMHG | WEIGHT: 289.01 LBS | OXYGEN SATURATION: 99 % | BODY MASS INDEX: 48.15 KG/M2

## 2018-08-29 DIAGNOSIS — R53.83 FATIGUE, UNSPECIFIED TYPE: Primary | ICD-10-CM

## 2018-08-29 DIAGNOSIS — G47.30 SLEEP APNEA, UNSPECIFIED TYPE: ICD-10-CM

## 2018-08-29 DIAGNOSIS — E78.5 HYPERLIPIDEMIA, UNSPECIFIED HYPERLIPIDEMIA TYPE: ICD-10-CM

## 2018-08-29 DIAGNOSIS — Z13.21 MALNUTRITION SCREEN: ICD-10-CM

## 2018-08-29 DIAGNOSIS — Z98.84 STATUS POST BARIATRIC SURGERY: ICD-10-CM

## 2018-08-29 DIAGNOSIS — Z13.0 SCREENING, IRON DEFICIENCY ANEMIA: ICD-10-CM

## 2018-08-29 DIAGNOSIS — M25.50 ARTHRALGIA, UNSPECIFIED JOINT: ICD-10-CM

## 2018-08-29 DIAGNOSIS — K21.9 GASTROESOPHAGEAL REFLUX DISEASE, ESOPHAGITIS PRESENCE NOT SPECIFIED: ICD-10-CM

## 2018-08-29 DIAGNOSIS — Z90.3 POSTGASTRECTOMY MALABSORPTION: ICD-10-CM

## 2018-08-29 DIAGNOSIS — E55.9 HYPOVITAMINOSIS D: ICD-10-CM

## 2018-08-29 DIAGNOSIS — R10.13 EPIGASTRIC PAIN: ICD-10-CM

## 2018-08-29 DIAGNOSIS — K91.2 POSTGASTRECTOMY MALABSORPTION: ICD-10-CM

## 2018-08-29 DIAGNOSIS — G47.33 OSA (OBSTRUCTIVE SLEEP APNEA): ICD-10-CM

## 2018-08-29 DIAGNOSIS — E11.8 TYPE 2 DIABETES MELLITUS WITH COMPLICATION, WITHOUT LONG-TERM CURRENT USE OF INSULIN (HCC): ICD-10-CM

## 2018-08-29 PROCEDURE — 99024 POSTOP FOLLOW-UP VISIT: CPT | Performed by: SURGERY

## 2018-08-29 RX ORDER — URSODIOL 250 MG/1
250 TABLET, FILM COATED ORAL 3 TIMES DAILY
COMMUNITY
End: 2021-08-05

## 2018-08-29 RX ORDER — OMEPRAZOLE 20 MG/1
20 CAPSULE, DELAYED RELEASE ORAL DAILY
COMMUNITY
End: 2021-08-05

## 2018-08-29 NOTE — PROGRESS NOTES
"[facility]  [address]  [facility] [address] [phone]      Patient Name:  Carlos Heller.  :  1980      Date of Visit: 2018      Reason for Visit:  1 month postop    HPI:  Carlos Heller is a 38 y.o. female s/p  18 LSG by Dr. Cardozo    C/o feeling lightheaded x 1 week.  Started  at [employer] while standing.  The feeling comes and goes.  Does not take meds for HTN.  Off all DM meds.  Off DM2 meds, did not check sugars.  PCP told her to start checking sugars--awaiting new meter.  C/o epigastric pain x 24 hours, 10/10.  No alleviating factors.  No vomiting, but ++nausea.  Does not radiate.  Didn't eat breakfast b/c felt ill.      Denies reflux.  Tolerating diet progression somewhat- on stage 4.  \"The only thing I'm eating is boiled eggs in the morning and potatoes.\"  Getting 40g prot/day.  Drinking 64+ fluid oz/day.  Taking MVI, B1, Vit D and iron, B12.  Skips calcium b/c she forgets.  On Omeprazole  and Actigall .  Still holding ASA , NSAIDs  and Steroids.  Exercise: not yet.  Intermittent constipation/diarrhea.       Presurgery weight:  334 pounds. Today's weight is 131 kg (289 lb 0.2 oz) pounds, today's Body mass index is 48.84 kg/m²., and her weight loss since surgery is 45 pounds.       Breakfast: 1 boiled egg OR oatmeal.      Lunch: skips b/c not hungry.    Dinner: crystal light.      Sips on 1-1.5 premier proteins per day     May eat watermelon if hungry.      Tolerates soft foods well.  Scared to try meat.  Tolerates refried beans with sour cream.      Past Medical History:   Diagnosis Date   • Carpal tunnel syndrome     NSAIDS daily, also gets steroid injection of right hand, last2018   • Depression    • Elevated ALT measurement    • Fatigue    • Fibromyalgia     patient thinks related to obesity   • Fibromyalgia    • Former smoker    • GERD (gastroesophageal reflux disease)     omeprazole BID and tums prn.  " EGD GDW  40 cm, no HH, path sugg IGNACIO. serum h. pyl neg   • GERD (gastroesophageal reflux disease)    • Hx MRSA infection     on (L) leg, treated w/ I&D, IV abx.      • Hyperlipidemia    • Joint pain     back, shoulder, hips. Recented Rx mobic. Steroid Injection of  L knee 3/2018   • Morbid obesity (CMS/[facility])    • ADDI (obstructive sleep apnea)     semi compliant with CPAP   • Psoriasis of scalp    • Pulmonary embolism (CMS/[facility])     unknown etiology, tx w/ coumadin   • Spinal headache    • Type 2 diabetes mellitus (CMS/[facility])     dx , follows w/ Endocrinology, on insulin since  checks sugar once a week   • Wears glasses    • Wears glasses      Past Surgical History:   Procedure Laterality Date   • ABDOMINAL HYSTERECTOMY      d/t postpartum bleeding  with ,  partial    • BILATERAL BREAST REDUCTION     • CARPAL TUNNEL RELEASE Left    •  SECTION  , 2009    x2, horizontal   • ENDOSCOPY N/A 2017    Procedure: ESOPHAGOGASTRODUODENOSCOPY;  Surgeon: Jean Cardozo MD;  [facility]:  [facility];  Service:    • ENDOSCOPY N/A 2018    Procedure: ESOPHAGOGASTRODUODENOSCOPY;  Surgeon: Jean Cardozo MD;  [facility]:  [facility];  Service: Bariatric   • GASTRIC SLEEVE LAPAROSCOPIC N/A 2018    Procedure: GASTRIC SLEEVE LAPAROSCOPICHIATAL;  Surgeon: Jean Cardozo MD;  [facility]:  [facility];  Service: Bariatric   • LAPAROSCOPIC SALPINGOOPHERECTOMY      for tubal pregnancy   • OTHER SURGICAL HISTORY      denies anesthesia issues   • WISDOM TOOTH EXTRACTION       Outpatient Prescriptions Marked as Taking for the 18 encounter (Office Visit) with Roseline Shannon MD   Medication Sig Dispense Refill   • omeprazole (priLOSEC) 20 MG capsule Take 20 mg by mouth Daily.     • ursodiol (ACTIGALL) 250 MG tablet Take 250 mg by mouth 3 (Three) Times a Day.       Allergies   Allergen Reactions   • Influenza Vaccines GI Intolerance   • Wellbutrin [Bupropion]  "Palpitations       Social History     Social History   • Marital status: Single     Spouse name: N/A   • Number of children: 3   • Years of education: GED     Occupational History   • Caregiver w/ [employer]      Social History Main Topics   • Smoking status: Former Smoker     Packs/day: 0.50     Years: 20.00     Types: Cigarettes     Quit date: 01/2017   • Smokeless tobacco: Never Used   • Alcohol use No   • Drug use: No   • Sexual activity: Defer      Comment: no hormones     Other Topics Concern   • Not on file     Social History Narrative    Lives in [place], [place] w/ 3 children.  Works as caregiver.        /84 (BP [facility]: Left arm, Patient Position: Sitting, Cuff Size: Large Adult)   Pulse 86   Temp 98.1 °F (36.7 °C) (Temporal Artery )   Resp 18   Ht 163.8 cm (64.5\")   Wt 131 kg (289 lb 0.2 oz)   SpO2 99%   BMI 48.84 kg/m²     Physical Exam   Constitutional: She is oriented to person, place, and time. She appears well-developed and well-nourished. No distress.   HENT:   Head: Normocephalic and atraumatic.   Eyes: Pupils are equal, round, and reactive to light. Conjunctivae and EOM are normal.   Pulmonary/Chest: Effort normal. No respiratory distress.   Abdominal: Soft. She exhibits no distension. There is no tenderness.   Neurological: She is alert and oriented to person, place, and time. No cranial nerve deficit.   Skin: Skin is warm and dry. No rash noted. She is not diaphoretic. No erythema.   Psychiatric: She has a normal mood and affect. Her behavior is normal. Judgment and thought content normal.         Assessment:  1 month s/p  7/18/18 LSG by Dr. Cardozo    Plan:  Doing fairly well.  Continue to advance diet per manual.  Start protein 100g/day!  I think some of her weakness/lighteheadedness/fatigue is from malnutrition.  Encouraged good food choices - high protein, low carb.  Continue routine exercise.  Routine bariatric labs ordered.  Continue vitamins w/ adjustments pending lab results.  " Call w/ problems/concerns.    Re: epigastric pain/dysphagia: UGI, GBUS.  May need EGD and HIDA if initial workup nondiagnostic.    If lightheaded--check blood sugar.    The patient was instructed to follow up in 2 months, sooner if needed.     Roseline Shannon MD

## 2018-09-01 LAB
25(OH)D3+25(OH)D2 SERPL-MCNC: 35 NG/ML (ref 30–100)
A-TOCOPHEROL VIT E SERPL-MCNC: 7.6 MG/L (ref 5.9–19.4)
ALBUMIN SERPL-MCNC: 3.9 G/DL (ref 3.5–5.5)
ALBUMIN/GLOB SERPL: 1.3 {RATIO} (ref 1.2–2.2)
ALP SERPL-CCNC: 49 IU/L (ref 39–117)
ALT SERPL-CCNC: 17 IU/L (ref 0–32)
AST SERPL-CCNC: 16 IU/L (ref 0–40)
BASOPHILS # BLD AUTO: 0 X10E3/UL (ref 0–0.2)
BASOPHILS NFR BLD AUTO: 0 %
BILIRUB SERPL-MCNC: 0.6 MG/DL (ref 0–1.2)
BUN SERPL-MCNC: 3 MG/DL (ref 6–20)
BUN/CREAT SERPL: 5 (ref 9–23)
CALCIUM SERPL-MCNC: 9.6 MG/DL (ref 8.7–10.2)
CHLORIDE SERPL-SCNC: 101 MMOL/L (ref 96–106)
CO2 SERPL-SCNC: 24 MMOL/L (ref 20–29)
CREAT SERPL-MCNC: 0.63 MG/DL (ref 0.57–1)
EOSINOPHIL # BLD AUTO: 0.1 X10E3/UL (ref 0–0.4)
EOSINOPHIL NFR BLD AUTO: 1 %
ERYTHROCYTE [DISTWIDTH] IN BLOOD BY AUTOMATED COUNT: 14.4 % (ref 12.3–15.4)
FERRITIN SERPL-MCNC: 588 NG/ML (ref 15–150)
FOLATE SERPL-MCNC: 7.5 NG/ML
GAMMA TOCOPHEROL SERPL-MCNC: 2.7 MG/L (ref 0.7–4.9)
GLOBULIN SER CALC-MCNC: 3 G/DL (ref 1.5–4.5)
GLUCOSE SERPL-MCNC: 86 MG/DL (ref 65–99)
HCT VFR BLD AUTO: 42.3 % (ref 34–46.6)
HGB BLD-MCNC: 14.1 G/DL (ref 11.1–15.9)
IMM GRANULOCYTES # BLD: 0 X10E3/UL (ref 0–0.1)
IMM GRANULOCYTES NFR BLD: 0 %
IRON SERPL-MCNC: 24 UG/DL (ref 27–159)
LYMPHOCYTES # BLD AUTO: 2.5 X10E3/UL (ref 0.7–3.1)
LYMPHOCYTES NFR BLD AUTO: 31 %
Lab: NORMAL
MAGNESIUM SERPL-MCNC: 2 MG/DL (ref 1.6–2.3)
MCH RBC QN AUTO: 28.3 PG (ref 26.6–33)
MCHC RBC AUTO-ENTMCNC: 33.3 G/DL (ref 31.5–35.7)
MCV RBC AUTO: 85 FL (ref 79–97)
METHYLMALONATE SERPL-SCNC: 61 NMOL/L (ref 0–378)
MONOCYTES # BLD AUTO: 0.7 X10E3/UL (ref 0.1–0.9)
MONOCYTES NFR BLD AUTO: 9 %
NEUTROPHILS # BLD AUTO: 4.8 X10E3/UL (ref 1.4–7)
NEUTROPHILS NFR BLD AUTO: 59 %
PHOSPHATE SERPL-MCNC: 3.7 MG/DL (ref 2.5–4.5)
PLATELET # BLD AUTO: 279 X10E3/UL (ref 150–379)
POTASSIUM SERPL-SCNC: 4 MMOL/L (ref 3.5–5.2)
PREALB SERPL-MCNC: 11 MG/DL (ref 14–35)
PROT SERPL-MCNC: 6.9 G/DL (ref 6–8.5)
PTH-INTACT SERPL-MCNC: 30 PG/ML (ref 15–65)
RBC # BLD AUTO: 4.99 X10E6/UL (ref 3.77–5.28)
SODIUM SERPL-SCNC: 144 MMOL/L (ref 134–144)
VIT A SERPL-MCNC: 12.1 UG/DL (ref 31.2–89.1)
VIT B1 BLD-SCNC: 68.9 NMOL/L (ref 66.5–200)
WBC # BLD AUTO: 8.2 X10E3/UL (ref 3.4–10.8)
ZINC SERPL-MCNC: 76 UG/DL (ref 56–134)

## 2018-09-17 RX ORDER — OMEPRAZOLE 40 MG/1
CAPSULE, DELAYED RELEASE ORAL
Qty: 60 CAPSULE | Refills: 0 | Status: SHIPPED | OUTPATIENT
Start: 2018-09-17 | End: 2021-08-05

## 2018-11-16 ENCOUNTER — OFFICE VISIT (OUTPATIENT)
Dept: RETAIL CLINIC | Facility: CLINIC | Age: 38
End: 2018-11-16

## 2018-11-16 VITALS
BODY MASS INDEX: 42.12 KG/M2 | TEMPERATURE: 97.9 F | DIASTOLIC BLOOD PRESSURE: 84 MMHG | HEART RATE: 41 BPM | HEIGHT: 67 IN | OXYGEN SATURATION: 97 % | SYSTOLIC BLOOD PRESSURE: 110 MMHG | WEIGHT: 268.4 LBS | RESPIRATION RATE: 18 BRPM

## 2018-11-16 DIAGNOSIS — R05.9 COUGH: ICD-10-CM

## 2018-11-16 DIAGNOSIS — H65.112 ACUTE MUCOID OTITIS MEDIA OF LEFT EAR: Primary | ICD-10-CM

## 2018-11-16 PROCEDURE — 99213 OFFICE O/P EST LOW 20 MIN: CPT | Performed by: NURSE PRACTITIONER

## 2018-11-16 RX ORDER — AZITHROMYCIN 250 MG/1
TABLET, FILM COATED ORAL
Qty: 6 TABLET | Refills: 0 | OUTPATIENT
Start: 2018-11-16 | End: 2021-03-12

## 2018-11-16 RX ORDER — DEXTROMETHORPHAN HYDROBROMIDE AND PROMETHAZINE HYDROCHLORIDE 15; 6.25 MG/5ML; MG/5ML
5 SYRUP ORAL 4 TIMES DAILY PRN
Qty: 118 ML | Refills: 1 | Status: SHIPPED | OUTPATIENT
Start: 2018-11-16 | End: 2021-08-05

## 2018-11-16 NOTE — PATIENT INSTRUCTIONS
Otitis Media, Adult  Otitis media is redness, soreness, and puffiness (swelling) in the space just behind your eardrum (middle ear). It may be caused by allergies or infection. It often happens along with a cold.  Follow these instructions at home:  · Take your medicine as told. Finish it even if you start to feel better.  · Only take over-the-counter or prescription medicines for pain, discomfort, or fever as told by your doctor.  · Follow up with your doctor as told.  Contact a doctor if:  · You have otitis media only in one ear, or bleeding from your nose, or both.  · You notice a lump on your neck.  · You are not getting better in 3-5 days.  · You feel worse instead of better.  Get help right away if:  · You have pain that is not helped with medicine.  · You have puffiness, redness, or pain around your ear.  · You get a stiff neck.  · You cannot move part of your face (paralysis).  · You notice that the bone behind your ear hurts when you touch it.  This information is not intended to replace advice given to you by your health care provider. Make sure you discuss any questions you have with your health care provider.  Document Released: 06/05/2009 Document Revised: 05/25/2017 Document Reviewed: 07/15/2014  [employer] Interactive Patient Education © 2017 [employer]    Cough, Adult  A cough helps to clear your throat and lungs. A cough may last only 2-3 weeks (acute), or it may last longer than 8 weeks (chronic). Many different things can cause a cough. A cough may be a sign of an illness or another medical condition.  Follow these instructions at home:  · Pay attention to any changes in your cough.  · Take medicines only as told by your doctor.  ? If you were prescribed an antibiotic medicine, take it as told by your doctor. Do not stop taking it even if you start to feel better.  ? Talk with your doctor before you try using a cough medicine.  · Drink enough fluid to keep your pee (urine) clear or pale  yellow.  · If the air is dry, use a cold steam vaporizer or humidifier in your home.  · Stay away from things that make you cough at work or at home.  · If your cough is worse at night, try using extra pillows to raise your head up higher while you sleep.  · Do not smoke, and try not to be around smoke. If you need help quitting, ask your doctor.  · Do not have caffeine.  · Do not drink alcohol.  · Rest as needed.  Contact a doctor if:  · You have new problems (symptoms).  · You cough up yellow fluid (pus).  · Your cough does not get better after 2-3 weeks, or your cough gets worse.  · Medicine does not help your cough and you are not sleeping well.  · You have pain that gets worse or pain that is not helped with medicine.  · You have a fever.  · You are losing weight and you do not know why.  · You have night sweats.  Get help right away if:  · You cough up blood.  · You have trouble breathing.  · Your heartbeat is very fast.  This information is not intended to replace advice given to you by your health care provider. Make sure you discuss any questions you have with your health care provider.  Document Released: 08/30/2012 Document Revised: 05/25/2017 Document Reviewed: 02/24/2016  [employer] Interactive Patient Education © 2018 [employer]

## 2018-11-21 RX ORDER — OMEPRAZOLE 40 MG/1
CAPSULE, DELAYED RELEASE ORAL
Qty: 60 CAPSULE | Refills: 0 | OUTPATIENT
Start: 2018-11-21

## 2019-04-22 RX ORDER — OMEPRAZOLE 20 MG/1
CAPSULE, DELAYED RELEASE ORAL
Qty: 60 CAPSULE | Refills: 0 | OUTPATIENT
Start: 2019-04-22

## 2019-04-23 NOTE — ANESTHESIA POSTPROCEDURE EVALUATION
----- Message from Naomi Hardwick [phone].   Patient: Carlos Heller    Procedure Summary     Date:  07/18/18 [facility] / [facility]:   [facility]    Anesthesia Start:  1246 Anesthesia Stop:      Procedures:       GASTRIC SLEEVE LAPAROSCOPICHIATAL (N/A Abdomen)      ESOPHAGOGASTRODUODENOSCOPY (N/A Esophagus) Diagnosis:       Morbid obesity with body mass index (BMI) of 50.0 to 59.9 in adult (CMS/HCC)      (Morbid obesity with body mass index (BMI) of 50.0 to 59.9 in adult [E66.01, Z68.43])    Surgeon:  Jean Cardozo MD Provider:  Rahul Becerra MD    Anesthesia Type:  general, regional ASA Status:  3          Anesthesia Type: general, regional  Last vitals 7/18/18 @ 1431  BP   142/74   Temp   97.0   Pulse   61   Resp   12    SpO2   94%     Post Anesthesia Care and Evaluation    Patient location during evaluation: PACU  Patient participation: waiting for patient participation  Level of consciousness: responsive to verbal stimuli  Pain score: 0  Pain management: adequate  Airway patency: patent  Anesthetic complications: No anesthetic complications    Cardiovascular status: stable  Respiratory status: acceptable, nasal cannula and spontaneous ventilation  Hydration status: stable    Comments: Pt transferred to PACU with O2. Vital signs stable. Report to PACU RN and care accepted.

## 2019-12-26 NOTE — PROGRESS NOTES
"Weight Loss Surgery  Presurgical Nutrition Assessment     Carlos Heller  18  [id number]  [id number]  1980  female    Surgery desired: Sleeve Gastrectomy    Vital Signs:  Weight: (!) 321 lb 8 oz (146 kg)   Body mass index is 50.35 kg/(m^2).  Past Medical History   Diagnosis Date   • Carpal tunnel syndrome      on Neurontin   • Fatigue    • GERD (gastroesophageal reflux disease)    • Hx MRSA infection      on (L) leg, treated w/ I&D, IV abx.   • Hyperlipidemia    • Joint pain    • Morbid obesity    • Pulmonary embolism      unknown etiology, tx w/ coumadin   • Sleep apnea      suspected, not yet tested   • Tobacco use    • Type 2 diabetes mellitus      dx , follows w/ Endocrinology, no insulin     Past Surgical History   Procedure Laterality Date   • Abdominal hysterectomy       d/t postpartum bleeding   • Bilateral breast reduction     • Carpal tunnel release Left    •  section  ,    • Laparoscopic salpingoopherectomy       for tubal pregnancy   • Other surgical history       denies anesthesia issues     No Known Allergies    Current Outpatient Prescriptions:   •  gabapentin (NEURONTIN) 300 MG capsule, Take 300 mg by mouth Daily., Disp: , Rfl:   •  glipiZIDE (GLUCOTROL) 10 MG tablet, Take 10 mg by mouth 2 (Two) Times a Day Before Meals., Disp: , Rfl:   •  linagliptin (TRADJENTA) 5 MG tablet tablet, Take 5 mg by mouth Daily., Disp: , Rfl:   •  omeprazole (priLOSEC) 20 MG capsule, Take 20 mg by mouth Daily., Disp: , Rfl:   •  pravastatin (PRAVACHOL) 10 MG tablet, Take 10 mg by mouth Daily., Disp: , Rfl:       Nutrition Assessment    Estimated energy needs: 2400    Estimated calories for weight loss:1700    IBW:  175      Excess body weight:146       Nutrition Recall  24 Hour recall: (B) (L) (D) -  Reviewed and discussed with patient  in general, an \"unhealthy\" diet    Exercise  rarely      Education    Provided manual:    Sleeve Gastrectomy    Recommend that " team proceed with surgery and follow per protocol.      Nutrition Goals   Dietary Guidelines per manual  Protein goal:  grams per day   Eliminate sugary beverages    Exercise Goals  Add 15-30 minutes of activity per day as tolerated    Discussion:      Rosibel Meehan RD  01/04/2017  10:14 AM   Ambulatory

## 2021-03-12 PROCEDURE — U0004 COV-19 TEST NON-CDC HGH THRU: HCPCS | Performed by: NURSE PRACTITIONER

## 2021-03-14 ENCOUNTER — TELEPHONE (OUTPATIENT)
Dept: URGENT CARE | Facility: CLINIC | Age: 41
End: 2021-03-14

## 2021-08-05 PROCEDURE — 87070 CULTURE OTHR SPECIMN AEROBIC: CPT | Performed by: NURSE PRACTITIONER

## 2021-08-05 PROCEDURE — 87205 SMEAR GRAM STAIN: CPT | Performed by: NURSE PRACTITIONER

## 2021-08-09 ENCOUNTER — TELEPHONE (OUTPATIENT)
Dept: URGENT CARE | Facility: CLINIC | Age: 41
End: 2021-08-09

## 2021-08-10 ENCOUNTER — TELEPHONE (OUTPATIENT)
Dept: URGENT CARE | Facility: CLINIC | Age: 41
End: 2021-08-10

## 2021-09-14 ENCOUNTER — APPOINTMENT (OUTPATIENT)
Dept: CT IMAGING | Facility: HOSPITAL | Age: 41
End: 2021-09-14

## 2021-09-14 ENCOUNTER — APPOINTMENT (OUTPATIENT)
Dept: GENERAL RADIOLOGY | Facility: HOSPITAL | Age: 41
End: 2021-09-14

## 2021-09-14 ENCOUNTER — HOSPITAL ENCOUNTER (EMERGENCY)
Facility: HOSPITAL | Age: 41
Discharge: HOME | End: 2021-09-14
Attending: EMERGENCY MEDICINE | Admitting: EMERGENCY MEDICINE

## 2021-09-14 VITALS
DIASTOLIC BLOOD PRESSURE: 70 MMHG | HEIGHT: 67 IN | WEIGHT: 230 LBS | HEART RATE: 62 BPM | TEMPERATURE: 98.1 F | BODY MASS INDEX: 36.1 KG/M2 | SYSTOLIC BLOOD PRESSURE: 116 MMHG | OXYGEN SATURATION: 99 % | RESPIRATION RATE: 18 BRPM

## 2021-09-14 DIAGNOSIS — U07.1 COVID-19: Primary | ICD-10-CM

## 2021-09-14 LAB
ALBUMIN SERPL-MCNC: 4.1 G/DL (ref 3.5–5.2)
ALBUMIN/GLOB SERPL: 1.3 G/DL
ALP SERPL-CCNC: 40 U/L (ref 39–117)
ALT SERPL W P-5'-P-CCNC: 15 U/L (ref 1–33)
ANION GAP SERPL CALCULATED.3IONS-SCNC: 10 MMOL/L (ref 5–15)
AST SERPL-CCNC: 18 U/L (ref 1–32)
BASOPHILS # BLD AUTO: 0.02 10*3/MM3 (ref 0–0.2)
BASOPHILS NFR BLD AUTO: 0.5 % (ref 0–1.5)
BILIRUB SERPL-MCNC: 0.6 MG/DL (ref 0–1.2)
BUN SERPL-MCNC: 5 MG/DL (ref 6–20)
BUN/CREAT SERPL: 6.2 (ref 7–25)
CALCIUM SPEC-SCNC: 9.1 MG/DL (ref 8.6–10.5)
CHLORIDE SERPL-SCNC: 104 MMOL/L (ref 98–107)
CO2 SERPL-SCNC: 26 MMOL/L (ref 22–29)
CREAT SERPL-MCNC: 0.81 MG/DL (ref 0.57–1)
DEPRECATED RDW RBC AUTO: 40.7 FL (ref 37–54)
EOSINOPHIL # BLD AUTO: 0.03 10*3/MM3 (ref 0–0.4)
EOSINOPHIL NFR BLD AUTO: 0.7 % (ref 0.3–6.2)
ERYTHROCYTE [DISTWIDTH] IN BLOOD BY AUTOMATED COUNT: 13 % (ref 12.3–15.4)
FLUAV RNA RESP QL NAA+PROBE: NOT DETECTED
FLUBV RNA RESP QL NAA+PROBE: NOT DETECTED
GFR SERPL CREATININE-BSD FRML MDRD: 95 ML/MIN/1.73
GLOBULIN UR ELPH-MCNC: 3.1 GM/DL
GLUCOSE SERPL-MCNC: 76 MG/DL (ref 65–99)
HCT VFR BLD AUTO: 43.3 % (ref 34–46.6)
HGB BLD-MCNC: 14.4 G/DL (ref 12–15.9)
HOLD SPECIMEN: NORMAL
IMM GRANULOCYTES # BLD AUTO: 0.01 10*3/MM3 (ref 0–0.05)
IMM GRANULOCYTES NFR BLD AUTO: 0.2 % (ref 0–0.5)
LIPASE SERPL-CCNC: 26 U/L (ref 13–60)
LYMPHOCYTES # BLD AUTO: 1.07 10*3/MM3 (ref 0.7–3.1)
LYMPHOCYTES NFR BLD AUTO: 26.3 % (ref 19.6–45.3)
MCH RBC QN AUTO: 28.8 PG (ref 26.6–33)
MCHC RBC AUTO-ENTMCNC: 33.3 G/DL (ref 31.5–35.7)
MCV RBC AUTO: 86.6 FL (ref 79–97)
MONOCYTES # BLD AUTO: 0.79 10*3/MM3 (ref 0.1–0.9)
MONOCYTES NFR BLD AUTO: 19.4 % (ref 5–12)
NEUTROPHILS NFR BLD AUTO: 2.15 10*3/MM3 (ref 1.7–7)
NEUTROPHILS NFR BLD AUTO: 52.9 % (ref 42.7–76)
NRBC BLD AUTO-RTO: 0 /100 WBC (ref 0–0.2)
NT-PROBNP SERPL-MCNC: 94.2 PG/ML (ref 0–450)
PLATELET # BLD AUTO: 204 10*3/MM3 (ref 140–450)
PMV BLD AUTO: 9.5 FL (ref 6–12)
POTASSIUM SERPL-SCNC: 3.6 MMOL/L (ref 3.5–5.2)
PROT SERPL-MCNC: 7.2 G/DL (ref 6–8.5)
QT INTERVAL: 370 MS
QT INTERVAL: 400 MS
QTC INTERVAL: 419 MS
QTC INTERVAL: 429 MS
RBC # BLD AUTO: 5 10*6/MM3 (ref 3.77–5.28)
SARS-COV-2 RNA RESP QL NAA+PROBE: DETECTED
SODIUM SERPL-SCNC: 140 MMOL/L (ref 136–145)
TROPONIN T SERPL-MCNC: <0.01 NG/ML (ref 0–0.03)
TROPONIN T SERPL-MCNC: <0.01 NG/ML (ref 0–0.03)
WBC # BLD AUTO: 4.07 10*3/MM3 (ref 3.4–10.8)
WHOLE BLOOD HOLD SPECIMEN: NORMAL
WHOLE BLOOD HOLD SPECIMEN: NORMAL

## 2021-09-14 PROCEDURE — 99283 EMERGENCY DEPT VISIT LOW MDM: CPT

## 2021-09-14 PROCEDURE — 71275 CT ANGIOGRAPHY CHEST: CPT

## 2021-09-14 PROCEDURE — 25010000002 DEXAMETHASONE PER 1 MG: Performed by: NURSE PRACTITIONER

## 2021-09-14 PROCEDURE — 96374 THER/PROPH/DIAG INJ IV PUSH: CPT

## 2021-09-14 PROCEDURE — 83690 ASSAY OF LIPASE: CPT | Performed by: EMERGENCY MEDICINE

## 2021-09-14 PROCEDURE — 83880 ASSAY OF NATRIURETIC PEPTIDE: CPT | Performed by: EMERGENCY MEDICINE

## 2021-09-14 PROCEDURE — 87636 SARSCOV2 & INF A&B AMP PRB: CPT | Performed by: EMERGENCY MEDICINE

## 2021-09-14 PROCEDURE — 71045 X-RAY EXAM CHEST 1 VIEW: CPT

## 2021-09-14 PROCEDURE — 93005 ELECTROCARDIOGRAM TRACING: CPT | Performed by: EMERGENCY MEDICINE

## 2021-09-14 PROCEDURE — 84484 ASSAY OF TROPONIN QUANT: CPT | Performed by: EMERGENCY MEDICINE

## 2021-09-14 PROCEDURE — 85025 COMPLETE CBC W/AUTO DIFF WBC: CPT | Performed by: EMERGENCY MEDICINE

## 2021-09-14 PROCEDURE — 0 IOPAMIDOL PER 1 ML: Performed by: EMERGENCY MEDICINE

## 2021-09-14 PROCEDURE — 80053 COMPREHEN METABOLIC PANEL: CPT | Performed by: EMERGENCY MEDICINE

## 2021-09-14 RX ORDER — DEXAMETHASONE SODIUM PHOSPHATE 4 MG/ML
6 INJECTION, SOLUTION INTRA-ARTICULAR; INTRALESIONAL; INTRAMUSCULAR; INTRAVENOUS; SOFT TISSUE ONCE
Status: COMPLETED | OUTPATIENT
Start: 2021-09-14 | End: 2021-09-14

## 2021-09-14 RX ORDER — SODIUM CHLORIDE 0.9 % (FLUSH) 0.9 %
10 SYRINGE (ML) INJECTION AS NEEDED
Status: DISCONTINUED | OUTPATIENT
Start: 2021-09-14 | End: 2021-09-14 | Stop reason: HOSPADM

## 2021-09-14 RX ORDER — ASPIRIN 81 MG/1
324 TABLET, CHEWABLE ORAL ONCE
Status: COMPLETED | OUTPATIENT
Start: 2021-09-14 | End: 2021-09-14

## 2021-09-14 RX ADMIN — IOPAMIDOL 80 ML: 755 INJECTION, SOLUTION INTRAVENOUS at 12:02

## 2021-09-14 RX ADMIN — ASPIRIN 81 MG CHEWABLE TABLET 324 MG: 81 TABLET CHEWABLE at 11:30

## 2021-09-14 RX ADMIN — DEXAMETHASONE SODIUM PHOSPHATE 6 MG: 4 INJECTION, SOLUTION INTRA-ARTICULAR; INTRALESIONAL; INTRAMUSCULAR; INTRAVENOUS; SOFT TISSUE at 12:56

## 2021-09-14 NOTE — DISCHARGE INSTRUCTIONS
Monitor your pulse ox.  If sats drop below 90 % and do not increase with deep breathing.  Return to the ER.    Return to the ER if you have an increase in SOA or Chest pain.

## 2021-09-14 NOTE — ED PROVIDER NOTES
Subjective   Carlos Heller is a 41 yr old female that presents the emergency department for complaints of shortness of breath.  Explains that she has been sick over the past few days.  Patient complains of chest pain across her chest.  She complains of cough, congestion.  Patient reports nausea but denies any vomiting.  She denies any diarrhea.  Patient has had a headache over the past 2 days.  Patient reports she has a history of PE but is not currently on any blood thinners.  Patient has not received her Covid vaccines and believes that she has been exposed.      History provided by:  Patient   used: No    Shortness of Breath  Severity:  Moderate  Duration:  2 days  Timing:  Constant  Progression:  Worsening  Associated symptoms: chest pain, cough and headaches    Associated symptoms: no abdominal pain, no fever and no vomiting        Review of Systems   Constitutional: Negative for fever.   Respiratory: Positive for cough and shortness of breath.    Cardiovascular: Positive for chest pain.   Gastrointestinal: Positive for nausea. Negative for abdominal pain, diarrhea and vomiting.   Neurological: Positive for headaches.   All other systems reviewed and are negative.      Past Medical History:   Diagnosis Date   • Carpal tunnel syndrome     NSAIDS daily, also gets steroid injection of right hand, last1/2018   • Depression    • Elevated ALT measurement    • Fatigue    • Fibromyalgia     patient thinks related to obesity   • Fibromyalgia    • Former smoker    • GERD (gastroesophageal reflux disease)     omeprazole BID and tums prn.  EGD [facility] 2/17 40 cm, no HH, path sugg IGNACIO. serum h. pyl neg   • GERD (gastroesophageal reflux disease)    • Hx MRSA infection     on (L) leg, treated w/ I&D, IV abx.      • Hyperlipidemia    • Joint pain     back, shoulder, hips. Recented Rx mobic. Steroid Injection of  L knee 3/2018   • Morbid obesity (CMS/HCC)    • ADDI (obstructive sleep apnea)     semi  compliant with CPAP   • Psoriasis of scalp    • Pulmonary embolism (CMS/HCC)     unknown etiology, tx w/ coumadin   • Spinal headache    • Type 2 diabetes mellitus (CMS/HCC)     dx , follows w/ Endocrinology, on insulin since 2017 checks sugar once a week   • Wears glasses    • Wears glasses        Allergies   Allergen Reactions   • Influenza Vaccines GI Intolerance   • Wellbutrin [Bupropion] Palpitations       Past Surgical History:   Procedure Laterality Date   • ABDOMINAL HYSTERECTOMY      d/t postpartum bleeding  with ,  partial    • BILATERAL BREAST REDUCTION     • CARPAL TUNNEL RELEASE Left    •  SECTION  , 2009    x2, horizontal   • ENDOSCOPY N/A 2017    Procedure: ESOPHAGOGASTRODUODENOSCOPY;  Surgeon: Jean Cardozo MD;  [facility]:  [facility];  Service:    • ENDOSCOPY N/A 2018    Procedure: ESOPHAGOGASTRODUODENOSCOPY;  Surgeon: Jean Cardozo MD;  [facility]:  [facility];  Service: Bariatric   • GASTRIC SLEEVE LAPAROSCOPIC N/A 2018    Procedure: GASTRIC SLEEVE LAPAROSCOPICHIATAL;  Surgeon: Jean Cardozo MD;  [facility]:  [facility];  Service: Bariatric   • LAPAROSCOPIC SALPINGOOPHERECTOMY      for tubal pregnancy   • OTHER SURGICAL HISTORY      denies anesthesia issues   • WISDOM TOOTH EXTRACTION         Family History   Problem Relation Age of Onset   • Diabetes Mother 35   • Hypertension Brother    • Sleep apnea Brother    • No Known Problems Father    • No Known Problems Sister    • No Known Problems Maternal Grandmother    • No Known Problems Maternal Grandfather    • No Known Problems Paternal Grandmother    • No Known Problems Paternal Grandfather        Social History     Socioeconomic History   • Marital status: Single     Spouse name: Not on file   • Number of children: 3   • Years of education: GED   • Highest education level: Not on file   Tobacco Use   • Smoking status: Former Smoker     Packs/day: 0.50     Years: 20.00      Pack years: 10.00     Types: Cigarettes     Quit date: 2017     Years since quittin.7   • Smokeless tobacco: Never Used   Substance and Sexual Activity   • Alcohol use: No   • Drug use: No   • Sexual activity: Defer     Comment: no hormones           Objective   Physical Exam  Vitals and nursing note reviewed.   Constitutional:       Appearance: Normal appearance. She is well-developed. She is ill-appearing. She is not toxic-appearing.   HENT:      Head: Normocephalic and atraumatic.   Eyes:      General: Lids are normal.      Extraocular Movements: Extraocular movements intact.      Conjunctiva/sclera: Conjunctivae normal.      Pupils: Pupils are equal, round, and reactive to light.   Neck:      Trachea: Trachea normal.   Cardiovascular:      Rate and Rhythm: Normal rate and regular rhythm.      Pulses: Normal pulses.      Heart sounds: Normal heart sounds.   Pulmonary:      Effort: Pulmonary effort is normal. No respiratory distress.      Breath sounds: Wheezing (scattered) present. No decreased breath sounds, rhonchi or rales.   Abdominal:      General: Bowel sounds are normal.      Palpations: Abdomen is soft.      Tenderness: There is no abdominal tenderness.   Musculoskeletal:         General: Normal range of motion.      Cervical back: Full passive range of motion without pain and normal range of motion.   Skin:     General: Skin is warm and dry.      Findings: No rash.   Neurological:      Mental Status: She is alert and oriented to person, place, and time.      Cranial Nerves: No cranial nerve deficit.   Psychiatric:         Speech: Speech normal.         Behavior: Behavior normal. Behavior is cooperative.         Procedures           ED Course  ED Course as of Sep 14 1651   Tue Sep 14, 2021   1028 COVID19(!!): Detected [KG]   1250 Results are discussed with patient at this time. Patient will be discharged home. Patient to follow-up with PCP. Patient agrees and verbalized understanding.    [KG]       ED Course User Index  [KG] Amy Bowman NI, APRN           Recent Results (from the past 24 hour(s))   ECG 12 Lead    Collection Time: 09/14/21  9:14 AM   Result Value Ref Range    QT Interval 370 ms    QTC Interval 429 ms   COVID-19 and FLU A/B PCR - Swab, Nasopharynx    Collection Time: 09/14/21  9:18 AM    Specimen: Nasopharynx; Swab   Result Value Ref Range    COVID19 Detected (C) Not Detected - Ref. Range    Influenza A PCR Not Detected Not Detected    Influenza B PCR Not Detected Not Detected   Troponin    Collection Time: 09/14/21  9:24 AM    Specimen: Blood   Result Value Ref Range    Troponin T <0.010 0.000 - 0.030 ng/mL   Comprehensive Metabolic Panel    Collection Time: 09/14/21  9:24 AM    Specimen: Blood   Result Value Ref Range    Glucose 76 65 - 99 mg/dL    BUN 5 (L) 6 - 20 mg/dL    Creatinine 0.81 0.57 - 1.00 mg/dL    Sodium 140 136 - 145 mmol/L    Potassium 3.6 3.5 - 5.2 mmol/L    Chloride 104 98 - 107 mmol/L    CO2 26.0 22.0 - 29.0 mmol/L    Calcium 9.1 8.6 - 10.5 mg/dL    Total Protein 7.2 6.0 - 8.5 g/dL    Albumin 4.10 3.50 - 5.20 g/dL    ALT (SGPT) 15 1 - 33 U/L    AST (SGOT) 18 1 - 32 U/L    Alkaline Phosphatase 40 39 - 117 U/L    Total Bilirubin 0.6 0.0 - 1.2 mg/dL    eGFR  African Amer 95 >60 mL/min/1.73    Globulin 3.1 gm/dL    A/G Ratio 1.3 g/dL    BUN/Creatinine Ratio 6.2 (L) 7.0 - 25.0    Anion Gap 10.0 5.0 - 15.0 mmol/L   Lipase    Collection Time: 09/14/21  9:24 AM    Specimen: Blood   Result Value Ref Range    Lipase 26 13 - 60 U/L   BNP    Collection Time: 09/14/21  9:24 AM    Specimen: Blood   Result Value Ref Range    proBNP 94.2 0.0 - 450.0 pg/mL   Green Top (Gel)    Collection Time: 09/14/21  9:24 AM   Result Value Ref Range    Extra Tube Hold for add-ons.    Lavender Top    Collection Time: 09/14/21  9:24 AM   Result Value Ref Range    Extra Tube hold for add-on    Gold Top - SST    Collection Time: 09/14/21  9:24 AM   Result Value Ref Range    Extra Tube Hold for add-ons.     Puente Top    Collection Time: 09/14/21  9:24 AM   Result Value Ref Range    Extra Tube Hold for add-ons.    Light Blue Top    Collection Time: 09/14/21  9:24 AM   Result Value Ref Range    Extra Tube hold for add-on    CBC Auto Differential    Collection Time: 09/14/21  9:24 AM    Specimen: Blood   Result Value Ref Range    WBC 4.07 3.40 - 10.80 10*3/mm3    RBC 5.00 3.77 - 5.28 10*6/mm3    Hemoglobin 14.4 12.0 - 15.9 g/dL    Hematocrit 43.3 34.0 - 46.6 %    MCV 86.6 79.0 - 97.0 fL    MCH 28.8 26.6 - 33.0 pg    MCHC 33.3 31.5 - 35.7 g/dL    RDW 13.0 12.3 - 15.4 %    RDW-SD 40.7 37.0 - 54.0 fl    MPV 9.5 6.0 - 12.0 fL    Platelets 204 140 - 450 10*3/mm3    Neutrophil % 52.9 42.7 - 76.0 %    Lymphocyte % 26.3 19.6 - 45.3 %    Monocyte % 19.4 (H) 5.0 - 12.0 %    Eosinophil % 0.7 0.3 - 6.2 %    Basophil % 0.5 0.0 - 1.5 %    Immature Grans % 0.2 0.0 - 0.5 %    Neutrophils, Absolute 2.15 1.70 - 7.00 10*3/mm3    Lymphocytes, Absolute 1.07 0.70 - 3.10 10*3/mm3    Monocytes, Absolute 0.79 0.10 - 0.90 10*3/mm3    Eosinophils, Absolute 0.03 0.00 - 0.40 10*3/mm3    Basophils, Absolute 0.02 0.00 - 0.20 10*3/mm3    Immature Grans, Absolute 0.01 0.00 - 0.05 10*3/mm3    nRBC 0.0 0.0 - 0.2 /100 WBC   Troponin    Collection Time: 09/14/21 11:09 AM    Specimen: Blood   Result Value Ref Range    Troponin T <0.010 0.000 - 0.030 ng/mL   ECG 12 Lead    Collection Time: 09/14/21 11:10 AM   Result Value Ref Range    QT Interval 400 ms    QTC Interval 419 ms     Note: In addition to lab results from this visit, the labs listed above may include labs taken at another facility or during a different encounter within the last 24 hours. Please correlate lab times with ED admission and discharge times for further clarification of the services performed during this visit.    CT Angiogram Chest   Final Result   No pulmonary embolus. Somewhat focal area of peribronchial   groundglass opacity and nodularity in the lingula concerning for   pneumonia.  "Findings are not typical of Covid 19 related pneumonia.           This report was finalized on 9/14/2021 12:35 PM by Jesus Dexter.          XR Chest 1 View   Final Result   No acute cardiopulmonary abnormality.       This report was finalized on 9/14/2021 10:32 AM by Jesus Dexter.            Vitals:    09/14/21 0906 09/14/21 1000 09/14/21 1030 09/14/21 1100   BP: 127/76 124/83 116/79 116/70   BP [facility]: Right arm      Patient Position: Sitting      Pulse: 88 67 75 62   Resp: 18      Temp: 98.1 °F (36.7 °C)      TempSrc: Tympanic      SpO2: 100% 100% 99%    Weight: 104 kg (230 lb)      Height: 170.2 cm (67\")        Medications   aspirin chewable tablet 324 mg (324 mg Oral Given 9/14/21 1130)   iopamidol (ISOVUE-370) 76 % injection 100 mL (80 mL Intravenous Given 9/14/21 1202)   dexamethasone (DECADRON) injection 6 mg (6 mg Intravenous Given 9/14/21 1256)     ECG/EMG Results (last 24 hours)     Procedure Component Value Units Date/Time    ECG 12 Lead [[id number]] Collected: 09/14/21 0914     Updated: 09/14/21 0944     QT Interval 370 ms      QTC Interval 429 ms     Narrative:      Test Reason : chest pain  Blood Pressure :   */*   mmHG  Vent. Rate :  81 BPM     Atrial Rate :  81 BPM     P-R Int : 170 ms          QRS Dur :  88 ms      QT Int : 370 ms       P-R-T Axes :  58  27 -11 degrees     QTc Int : 429 ms    Normal sinus rhythm  Cannot rule out Anterior infarct (cited on or before 29-JAN-2017)  Abnormal ECG  When compared with ECG of 29-JAN-2017 10:24,  No significant change was found  Confirmed by MARITZA FOSTER MD ([id number]) on 9/14/2021 9:44:09 AM    Referred By: EDMD           Confirmed By: MARITZA FOSTER MD    ECG 12 Lead [[id number]] Collected: 09/14/21 1110     Updated: 09/14/21 1213     QT Interval 400 ms      QTC Interval 419 ms     Narrative:      Test Reason : chest pain  Blood Pressure :   */*   mmHG  Vent. Rate :  66 BPM     Atrial Rate :  66 BPM     P-R Int : 160 ms          QRS Dur :  86 ms      " QT Int : 400 ms       P-R-T Axes :  40  67 -28 degrees     QTc Int : 419 ms      Normal sinus rhythm with sinus arrhythmia  Cannot rule out Anterior infarct (cited on or before 29-JAN-2017)  Abnormal ECG  When compared with ECG of 14-SEP-2021 09:14,  No significant change was found  Confirmed by MARITZA FOSTER MD ([id number]) on 9/14/2021 12:13:41 PM    Referred By: EDMD           Confirmed By: MARITZA FOSTER MD        ECG 12 Lead   Final Result   Test Reason : chest pain   Blood Pressure :   */*   mmHG   Vent. Rate :  66 BPM     Atrial Rate :  66 BPM      P-R Int : 160 ms          QRS Dur :  86 ms       QT Int : 400 ms       P-R-T Axes :  40  67 -28 degrees      QTc Int : 419 ms         Normal sinus rhythm with sinus arrhythmia   Cannot rule out Anterior infarct (cited on or before 29-JAN-2017)   Abnormal ECG   When compared with ECG of 14-SEP-2021 09:14,   No significant change was found   Confirmed by MARITZA FOSTER MD ([id number]) on 9/14/2021 12:13:41 PM      Referred By: EDMD           Confirmed By: MARITZA FOSTER MD      ECG 12 Lead   Final Result   Test Reason : chest pain   Blood Pressure :   */*   mmHG   Vent. Rate :  81 BPM     Atrial Rate :  81 BPM      P-R Int : 170 ms          QRS Dur :  88 ms       QT Int : 370 ms       P-R-T Axes :  58  27 -11 degrees      QTc Int : 429 ms      Normal sinus rhythm   Cannot rule out Anterior infarct (cited on or before 29-JAN-2017)   Abnormal ECG   When compared with ECG of 29-JAN-2017 10:24,   No significant change was found   Confirmed by MARITZA FOSTER MD ([id number]) on 9/14/2021 9:44:09 AM      Referred By: DEEP           Confirmed By: MARITZA FOSTER MD                                          [facility]    Final diagnoses:   COVID-19       ED Disposition  ED Disposition     ED Disposition Condition Comment    Discharge Stable           Provider, No Known  [place] [id number]               Medication List      No changes were made to your prescriptions during  this visit.          Amy Bowman, APRN  09/14/21 [id number]

## 2022-02-23 PROCEDURE — 87591 N.GONORRHOEAE DNA AMP PROB: CPT | Performed by: FAMILY MEDICINE

## 2022-02-23 PROCEDURE — 87798 DETECT AGENT NOS DNA AMP: CPT | Performed by: FAMILY MEDICINE

## 2022-02-23 PROCEDURE — 87801 DETECT AGNT MULT DNA AMPLI: CPT | Performed by: FAMILY MEDICINE

## 2022-02-23 PROCEDURE — 87491 CHLMYD TRACH DNA AMP PROBE: CPT | Performed by: FAMILY MEDICINE

## 2022-02-23 PROCEDURE — 87661 TRICHOMONAS VAGINALIS AMPLIF: CPT | Performed by: FAMILY MEDICINE

## 2022-02-26 ENCOUNTER — TELEPHONE (OUTPATIENT)
Dept: URGENT CARE | Facility: CLINIC | Age: 42
End: 2022-02-26

## 2022-02-26 NOTE — TELEPHONE ENCOUNTER
----- Message from PATRICIA Lane sent at 2/26/2022  9:03 AM EST -----  Swab results are negative.  Please notify patient and assess symptoms.

## 2022-07-29 ENCOUNTER — OFFICE VISIT (OUTPATIENT)
Dept: INTERNAL MEDICINE | Facility: CLINIC | Age: 42
End: 2022-07-29

## 2022-07-29 VITALS
TEMPERATURE: 98.2 F | SYSTOLIC BLOOD PRESSURE: 122 MMHG | RESPIRATION RATE: 18 BRPM | HEIGHT: 65 IN | BODY MASS INDEX: 38.69 KG/M2 | OXYGEN SATURATION: 98 % | HEART RATE: 68 BPM | WEIGHT: 232.2 LBS | DIASTOLIC BLOOD PRESSURE: 82 MMHG

## 2022-07-29 DIAGNOSIS — F41.9 ANXIETY AND DEPRESSION: ICD-10-CM

## 2022-07-29 DIAGNOSIS — E66.01 CLASS 2 SEVERE OBESITY DUE TO EXCESS CALORIES WITH SERIOUS COMORBIDITY AND BODY MASS INDEX (BMI) OF 38.0 TO 38.9 IN ADULT: ICD-10-CM

## 2022-07-29 DIAGNOSIS — D50.9 IRON DEFICIENCY ANEMIA, UNSPECIFIED IRON DEFICIENCY ANEMIA TYPE: ICD-10-CM

## 2022-07-29 DIAGNOSIS — F17.200 TOBACCO USE DISORDER: ICD-10-CM

## 2022-07-29 DIAGNOSIS — M54.17 LUMBOSACRAL RADICULOPATHY: ICD-10-CM

## 2022-07-29 DIAGNOSIS — Z00.00 HEALTHCARE MAINTENANCE: Primary | ICD-10-CM

## 2022-07-29 DIAGNOSIS — N95.1 MENOPAUSAL VASOMOTOR SYNDROME: ICD-10-CM

## 2022-07-29 DIAGNOSIS — E11.319 TYPE 2 DIABETES MELLITUS WITH RETINOPATHY OF LEFT EYE, WITHOUT LONG-TERM CURRENT USE OF INSULIN, MACULAR EDEMA PRESENCE UNSPECIFIED, UNSPECIFIED RETINOPATHY SEVERITY: ICD-10-CM

## 2022-07-29 DIAGNOSIS — F32.A ANXIETY AND DEPRESSION: ICD-10-CM

## 2022-07-29 PROBLEM — IMO0002 UNCONTROLLED TYPE 2 DIABETES MELLITUS: Status: ACTIVE | Noted: 2018-04-23

## 2022-07-29 PROBLEM — Z98.84 HISTORY OF BARIATRIC SURGERY: Status: ACTIVE | Noted: 2018-09-18

## 2022-07-29 PROBLEM — M79.7 FIBROMYALGIA: Status: ACTIVE | Noted: 2018-12-18

## 2022-07-29 PROBLEM — E66.812 CLASS 2 OBESITY DUE TO EXCESS CALORIES WITH BODY MASS INDEX (BMI) OF 38.0 TO 38.9 IN ADULT: Status: ACTIVE | Noted: 2018-07-02

## 2022-07-29 PROBLEM — E66.09 CLASS 2 OBESITY DUE TO EXCESS CALORIES WITH BODY MASS INDEX (BMI) OF 38.0 TO 38.9 IN ADULT: Status: ACTIVE | Noted: 2018-07-02

## 2022-07-29 PROBLEM — Z98.84 HISTORY OF BARIATRIC SURGERY: Status: RESOLVED | Noted: 2018-09-18 | Resolved: 2022-07-29

## 2022-07-29 PROBLEM — K21.9 GASTROESOPHAGEAL REFLUX DISEASE WITHOUT ESOPHAGITIS: Status: ACTIVE | Noted: 2018-12-18

## 2022-07-29 PROBLEM — M79.7 FIBROMYALGIA: Status: RESOLVED | Noted: 2018-12-18 | Resolved: 2022-07-29

## 2022-07-29 LAB
EXPIRATION DATE: NORMAL
HBA1C MFR BLD: 5.7 %
Lab: NORMAL

## 2022-07-29 PROCEDURE — 99396 PREV VISIT EST AGE 40-64: CPT | Performed by: STUDENT IN AN ORGANIZED HEALTH CARE EDUCATION/TRAINING PROGRAM

## 2022-07-29 PROCEDURE — 3044F HG A1C LEVEL LT 7.0%: CPT | Performed by: STUDENT IN AN ORGANIZED HEALTH CARE EDUCATION/TRAINING PROGRAM

## 2022-07-29 PROCEDURE — 2014F MENTAL STATUS ASSESS: CPT | Performed by: STUDENT IN AN ORGANIZED HEALTH CARE EDUCATION/TRAINING PROGRAM

## 2022-07-29 PROCEDURE — 83036 HEMOGLOBIN GLYCOSYLATED A1C: CPT | Performed by: STUDENT IN AN ORGANIZED HEALTH CARE EDUCATION/TRAINING PROGRAM

## 2022-07-29 PROCEDURE — 3008F BODY MASS INDEX DOCD: CPT | Performed by: STUDENT IN AN ORGANIZED HEALTH CARE EDUCATION/TRAINING PROGRAM

## 2022-07-29 RX ORDER — NICOTINE 21 MG/24HR
1 PATCH, TRANSDERMAL 24 HOURS TRANSDERMAL EVERY 24 HOURS
Qty: 30 EACH | Refills: 3 | Status: SHIPPED | OUTPATIENT
Start: 2022-07-29 | End: 2022-08-08 | Stop reason: DRUGHIGH

## 2022-07-29 RX ORDER — ESCITALOPRAM OXALATE 10 MG/1
10 TABLET ORAL DAILY
Qty: 45 TABLET | Refills: 0 | Status: SHIPPED | OUTPATIENT
Start: 2022-07-29 | End: 2022-08-08

## 2022-07-29 RX ORDER — HYDROCODONE BITARTRATE AND ACETAMINOPHEN 5; 325 MG/1; MG/1
TABLET ORAL
COMMUNITY
Start: 2022-07-21 | End: 2022-07-29

## 2022-07-29 NOTE — PATIENT INSTRUCTIONS
Health Maintenance, Female  Adopting a healthy lifestyle and getting preventive care can go a long way to promote health and wellness. Talk with your health care provider about what schedule of regular examinations is right for you. This is a good chance for you to check in with your provider about disease prevention and staying healthy.  In between checkups, there are plenty of things you can do on your own. Experts have done a lot of research about which lifestyle changes and preventive measures are most likely to keep you healthy. Ask your health care provider for more information.  Weight and diet  Eat a healthy diet  Be sure to include plenty of vegetables, fruits, low-fat dairy products, and lean protein.  Do not eat a lot of foods high in solid fats, added sugars, or salt.  Get regular exercise. This is one of the most important things you can do for your health.  Most adults should exercise for at least 150 minutes each week. The exercise should increase your heart rate and make you sweat (moderate-intensity exercise).  Most adults should also do strengthening exercises at least twice a week. This is in addition to the moderate-intensity exercise.     Maintain a healthy weight  Body mass index (BMI) is a measurement that can be used to identify possible weight problems. It estimates body fat based on height and weight. Your health care provider can help determine your BMI and help you achieve or maintain a healthy weight.  For females 20 years of age and older:  A BMI below 18.5 is considered underweight.  A BMI of 18.5 to 24.9 is normal.  A BMI of 25 to 29.9 is considered overweight.  A BMI of 30 and above is considered obese.     Watch levels of cholesterol and blood lipids  You should start having your blood tested for lipids and cholesterol at 20 years of age, then have this test every 5 years.  You may need to have your cholesterol levels checked more often if:  Your lipid or cholesterol levels are  high.  You are older than 50 years of age.  You are at high risk for heart disease.     Cancer screening  Lung Cancer  Lung cancer screening is recommended for adults 55-80 years old who are at high risk for lung cancer because of a history of smoking.  A yearly low-dose CT scan of the lungs is recommended for people who:  Currently smoke.  Have quit within the past 15 years.  Have at least a 30-pack-year history of smoking. A pack year is smoking an average of one pack of cigarettes a day for 1 year.  Yearly screening should continue until it has been 15 years since you quit.  Yearly screening should stop if you develop a health problem that would prevent you from having lung cancer treatment.     Breast Cancer  Practice breast self-awareness. This means understanding how your breasts normally appear and feel.  It also means doing regular breast self-exams. Let your health care provider know about any changes, no matter how small.  If you are in your 20s or 30s, you should have a clinical breast exam (CBE) by a health care provider every 1-3 years as part of a regular health exam.  If you are 40 or older, have a CBE every year. Also consider having a breast X-ray (mammogram) every year.  If you have a family history of breast cancer, talk to your health care provider about genetic screening.  If you are at high risk for breast cancer, talk to your health care provider about having an MRI and a mammogram every year.  Breast cancer gene (BRCA) assessment is recommended for women who have family members with BRCA-related cancers. BRCA-related cancers include:  Breast.  Ovarian.  Tubal.  Peritoneal cancers.  Results of the assessment will determine the need for genetic counseling and BRCA1 and BRCA2 testing.     Cervical Cancer  Your health care provider may recommend that you be screened regularly for cancer of the pelvic organs (ovaries, uterus, and vagina). This screening involves a pelvic examination, including  checking for microscopic changes to the surface of your cervix (Pap test). You may be encouraged to have this screening done every 3 years, beginning at age 21.  For women ages 30-65, health care providers may recommend pelvic exams and Pap testing every 3 years, or they may recommend the Pap and pelvic exam, combined with testing for human papilloma virus (HPV), every 5 years. Some types of HPV increase your risk of cervical cancer. Testing for HPV may also be done on women of any age with unclear Pap test results.  Other health care providers may not recommend any screening for nonpregnant women who are considered low risk for pelvic cancer and who do not have symptoms. Ask your health care provider if a screening pelvic exam is right for you.  If you have had past treatment for cervical cancer or a condition that could lead to cancer, you need Pap tests and screening for cancer for at least 20 years after your treatment. If Pap tests have been discontinued, your risk factors (such as having a new sexual partner) need to be reassessed to determine if screening should resume. Some women have medical problems that increase the chance of getting cervical cancer. In these cases, your health care provider may recommend more frequent screening and Pap tests.     Colorectal Cancer  This type of cancer can be detected and often prevented.  Routine colorectal cancer screening usually begins at 50 years of age and continues through 75 years of age.  Your health care provider may recommend screening at an earlier age if you have risk factors for colon cancer.  Your health care provider may also recommend using home test kits to check for hidden blood in the stool.  A small camera at the end of a tube can be used to examine your colon directly (sigmoidoscopy or colonoscopy). This is done to check for the earliest forms of colorectal cancer.  Routine screening usually begins at age 50.  Direct examination of the colon should  be repeated every 5-10 years through 75 years of age. However, you may need to be screened more often if early forms of precancerous polyps or small growths are found.     Skin Cancer  Check your skin from head to toe regularly.  Tell your health care provider about any new moles or changes in moles, especially if there is a change in a mole's shape or color.  Also tell your health care provider if you have a mole that is larger than the size of a pencil eraser.  Always use sunscreen. Apply sunscreen liberally and repeatedly throughout the day.  Protect yourself by wearing long sleeves, pants, a wide-brimmed hat, and sunglasses whenever you are outside.     Heart disease, diabetes, and high blood pressure  High blood pressure causes heart disease and increases the risk of stroke. High blood pressure is more likely to develop in:  People who have blood pressure in the high end of the normal range (130-139/85-89 mm Hg).  People who are overweight or obese.  People who are .  If you are 18-39 years of age, have your blood pressure checked every 3-5 years. If you are 40 years of age or older, have your blood pressure checked every year. You should have your blood pressure measured twice--once when you are at a hospital or clinic, and once when you are not at a hospital or clinic. Record the average of the two measurements. To check your blood pressure when you are not at a hospital or clinic, you can use:  An automated blood pressure machine at a pharmacy.  A home blood pressure monitor.  If you are between 55 years and 79 years old, ask your health care provider if you should take aspirin to prevent strokes.  Have regular diabetes screenings. This involves taking a blood sample to check your fasting blood sugar level.  If you are at a normal weight and have a low risk for diabetes, have this test once every three years after 45 years of age.  If you are overweight and have a high risk for diabetes,  consider being tested at a younger age or more often.  Preventing infection  Hepatitis B  If you have a higher risk for hepatitis B, you should be screened for this virus. You are considered at high risk for hepatitis B if:  You were born in a country where hepatitis B is common. Ask your health care provider which countries are considered high risk.  Your parents were born in a high-risk country, and you have not been immunized against hepatitis B (hepatitis B vaccine).  You have HIV or AIDS.  You use needles to inject street drugs.  You live with someone who has hepatitis B.  You have had sex with someone who has hepatitis B.  You get hemodialysis treatment.  You take certain medicines for conditions, including cancer, organ transplantation, and autoimmune conditions.     Hepatitis C  Blood testing is recommended for:  Everyone born from 1945 through 1965.  Anyone with known risk factors for hepatitis C.     Sexually transmitted infections (STIs)  You should be screened for sexually transmitted infections (STIs) including gonorrhea and chlamydia if:  You are sexually active and are younger than 24 years of age.  You are older than 24 years of age and your health care provider tells you that you are at risk for this type of infection.  Your sexual activity has changed since you were last screened and you are at an increased risk for chlamydia or gonorrhea. Ask your health care provider if you are at risk.  If you do not have HIV, but are at risk, it may be recommended that you take a prescription medicine daily to prevent HIV infection. This is called pre-exposure prophylaxis (PrEP). You are considered at risk if:  You are sexually active and do not regularly use condoms or know the HIV status of your partner(s).  You take drugs by injection.  You are sexually active with a partner who has HIV.     Talk with your health care provider about whether you are at high risk of being infected with HIV. If you choose to  begin PrEP, you should first be tested for HIV. You should then be tested every 3 months for as long as you are taking PrEP.  Pregnancy  If you are premenopausal and you may become pregnant, ask your health care provider about preconception counseling.  If you may become pregnant, take 400 to 800 micrograms (mcg) of folic acid every day.  If you want to prevent pregnancy, talk to your health care provider about birth control (contraception).  Osteoporosis and menopause  Osteoporosis is a disease in which the bones lose minerals and strength with aging. This can result in serious bone fractures. Your risk for osteoporosis can be identified using a bone density scan.  If you are 65 years of age or older, or if you are at risk for osteoporosis and fractures, ask your health care provider if you should be screened.  Ask your health care provider whether you should take a calcium or vitamin D supplement to lower your risk for osteoporosis.  Menopause may have certain physical symptoms and risks.  Hormone replacement therapy may reduce some of these symptoms and risks.  Talk to your health care provider about whether hormone replacement therapy is right for you.  Follow these instructions at home:  Schedule regular health, dental, and eye exams.  Stay current with your immunizations.  Do not use any tobacco products including cigarettes, chewing tobacco, or electronic cigarettes.  If you are pregnant, do not drink alcohol.  If you are breastfeeding, limit how much and how often you drink alcohol.  Limit alcohol intake to no more than 1 drink per day for nonpregnant women. One drink equals 12 ounces of beer, 5 ounces of wine, or 1½ ounces of hard liquor.  Do not use street drugs.  Do not share needles.  Ask your health care provider for help if you need support or information about quitting drugs.  Tell your health care provider if you often feel depressed.  Tell your health care provider if you have ever been abused or do  not feel safe at home.  This information is not intended to replace advice given to you by your health care provider. Make sure you discuss any questions you have with your health care provider.  Document Released: 07/02/2012 Document Revised: 05/25/2017 Document Reviewed: 09/20/2016  [employer] Patient Education © 2018 [employer]

## 2022-07-29 NOTE — PROGRESS NOTES
New Patient Office Visit      Date: 2022   Patient Name: Carlos Heller  : 1980   MRN: [id number]     Chief Complaint:    Chief Complaint   Patient presents with   • Annual Exam     Establish care.   • Numbness     HAND, SIDE AT NIGHT    • Nutrition Counseling     ADEPEX         History of Present Illness: Carlos Heller is a 42 y.o. female who is here today to establish care.    Iron Deficiency Anemia  - notes that she previously was receiving treatments  - states that she took iron pills at first, but her iron was too low to maintain and she switched to iron infusions    Menopausal vasomotor symptoms  - notes previous unilateral ovary removal with hysterectomy  - States that she has hot flashes intermittently throughout the day  - notes mood swings have been worse recently  -Notes that sleep has been more difficult secondary to the symptoms    Obesity  Diabetes Mellitus Type 2  - notes that she previously had bariatric surgery  - states that she had subsequently taken phenterimine  - notes that she is craving sugar and chips  - states that her appetite is not well controlled currently  -   Notes that she was told about a retinal issue during her last ophthalmology screening    Viral upper respiratory infection  - notes that he daughter has strep  - states that her throat has been more painful and harder to swallow  - denies rhinorrhea  - states that she has a headache that has been mild, but present for 2 weeks  -   Notes mild central chest pain that has been chronic and unchanged based on exertion    Tobacco Use Disorder  - notes some central chest pain  -   States that she had approximately 5 years of tobacco cessation, but then restarted recently  -   Notes that she is interested in quitting as soon as possible  -   States that she is interested in nicotine replacement therapy, but not bupropion or Chantix    Anxiety and Depression  - notes frequent mood swings  - notes that  she was on celexa, zoloft, bupropion  - denies SI/HI  -   Notes that anxiety and depression have not been well controlled and are functionally limiting at this time          Subjective      Review of Systems:   Review of Systems   Constitutional: Positive for fatigue. Negative for activity change, appetite change and fever.   HENT: Positive for sore throat. Negative for congestion and postnasal drip.    Eyes: Negative for blurred vision, photophobia and visual disturbance.   Respiratory: Negative for cough, chest tightness and shortness of breath.    Cardiovascular: Positive for chest pain (Mild and central). Negative for palpitations.   Gastrointestinal: Negative for abdominal distention, abdominal pain, blood in stool, constipation, diarrhea, nausea and vomiting.   Endocrine: Positive for heat intolerance.   Genitourinary: Negative for dysuria and hematuria.   Musculoskeletal: Negative for arthralgias, back pain and joint swelling.   Skin: Negative for rash and wound.   Neurological: Negative for weakness, headache and confusion.   Psychiatric/Behavioral: Positive for sleep disturbance and depressed mood. Negative for self-injury and suicidal ideas. The patient is nervous/anxious.        Past Medical History:   Past Medical History:   Diagnosis Date   • Abscess of vulva 5/19/2016   • Carpal tunnel syndrome     NSAIDS daily, also gets steroid injection of right hand, last1/2018   • Depression    • Elevated ALT measurement    • Fatigue    • Fibromyalgia     patient thinks related to obesity   • Fibromyalgia    • Former smoker    • GERD (gastroesophageal reflux disease)     omeprazole BID and tums prn.  EGD [facility] 2/17 40 cm, no HH, path sugg IGNACIO. serum h. pyl neg   • GERD (gastroesophageal reflux disease)    • History of bariatric surgery 9/18/2018   • Hx MRSA infection     on (L) leg, treated w/ I&D, IV abx.      • Hyperlipidemia    • Joint pain     back, shoulder, hips. Recented Rx mobic. Steroid Injection of  L knee  3/2018   • Morbid obesity (HCC)    • ADDI (obstructive sleep apnea)     semi compliant with CPAP   • Psoriasis of scalp    • Pulmonary embolism (HCC)     unknown etiology, tx w/ coumadin   • Spinal headache    • Type 2 diabetes mellitus (HCC)     dx , follows w/ Endocrinology, on insulin since  checks sugar once a week   • Wears glasses    • Wears glasses        Past Surgical History:   Past Surgical History:   Procedure Laterality Date   • ABDOMINAL HYSTERECTOMY      d/t postpartum bleeding  with ,  partial    • BILATERAL BREAST REDUCTION     • CARPAL TUNNEL RELEASE Left    •  SECTION  , 2009    x2, horizontal   • ENDOSCOPY N/A 2017    Procedure: ESOPHAGOGASTRODUODENOSCOPY;  Surgeon: Jean Cardozo MD;  [facility]:  [facility];  Service:    • ENDOSCOPY N/A 2018    Procedure: ESOPHAGOGASTRODUODENOSCOPY;  Surgeon: Jean Cardozo MD;  [facility]:  [facility];  Service: Bariatric   • GASTRIC SLEEVE LAPAROSCOPIC N/A 2018    Procedure: GASTRIC SLEEVE LAPAROSCOPICHIATAL;  Surgeon: Jean Cardozo MD;  [facility]:  [facility];  Service: Bariatric   • LAPAROSCOPIC SALPINGOOPHERECTOMY      for tubal pregnancy   • OTHER SURGICAL HISTORY      denies anesthesia issues   • WISDOM TOOTH EXTRACTION         Family History:   Family History   Problem Relation Age of Onset   • Diabetes Mother 35   • Hypertension Brother    • Sleep apnea Brother    • No Known Problems Father    • No Known Problems Sister    • No Known Problems Maternal Grandmother    • No Known Problems Maternal Grandfather    • No Known Problems Paternal Grandmother    • No Known Problems Paternal Grandfather        Social History:   Social History     Socioeconomic History   • Marital status: Single   • Number of children: 3   • Years of education: GED   Tobacco Use   • Smoking status: Current Every Day Smoker     Packs/day: 0.50     Years: 20.00     Pack years: 10.00     Types: Cigarettes      Last attempt to quit: 2022     Years since quittin.4   • Smokeless tobacco: Never Used   Vaping Use   • Vaping Use: Never used   Substance and Sexual Activity   • Alcohol use: No   • Drug use: No   • Sexual activity: Defer     Comment: no hormones       Medications:     Current Outpatient Medications:   •  escitalopram (Lexapro) 10 MG tablet, Take 1 tablet by mouth Daily., Disp: 45 tablet, Rfl: 0  •  nicotine (NICODERM CQ) 14 MG/24HR patch, Place 1 patch on the skin as directed by provider Daily., Disp: 30 each, Rfl: 3  •  Semaglutide,0.25 or 0.5MG/DOS, (OZEMPIC) 2 MG/1.5ML solution pen-injector, Inject 0.25 mg under the skin into the appropriate area as directed 1 (One) Time Per Week., Disp: 5 pen, Rfl: 2    Allergies:   Allergies   Allergen Reactions   • Influenza Vaccines GI Intolerance   • Wellbutrin [Bupropion] Palpitations       Immunizations:  Immunization History   Administered Date(s) Administered   • Influenza TIV (IM) 10/24/2007       Colorectal Screening:   Due at age 47   Last Completed Colonoscopy     This patient has no relevant Health Maintenance data.        Pap:  N/A, s/p total hysterectomy  Last Completed Pap Smear          Discontinued - PAP SMEAR  Discontinued    No completion history exists for this topic.              Mammogram:    Last Completed Mammogram     This patient has no relevant Health Maintenance data.           CT for Smoker (Age 50-80, 20 pk yr): Not applicable currently  Bone Density/DEXA (Age 65 or high risk): DEXA scan to be completed at age 65  Hep C (Age 18-79 once): Ordered on 2022  HIV (Age 15-65 once): Ordered on 2022  A1c: Ordered on 2022  Lipid panel: Ordered on 2022  The ASCVD Risk score (Jeffrey SHYAM Jr., et al., 2013) failed to calculate for the following reasons:    Cannot find a previous HDL lab    Cannot find a previous total cholesterol lab    Dermatology: Plans to establish in the future  Ophthalmologist: already established  Dentist:  Plans to establish in the future    Tobacco Use: High Risk   • Smoking Tobacco Use: Current Every Day Smoker   • Smokeless Tobacco Use: Never Used       Social History     Substance and Sexual Activity   Alcohol Use No       Social History     Substance and Sexual Activity   Drug Use No        Diet/Physical activity: Notes that diet has been specifically uncontrolled over the last few months and patient notes that she has little control over what she eats or quantity of food, notes that back pain that radiates down the right side of her leg has been limiting for exercise    Menopause: Premenopausal  Menstrual Cycles: Status post total hysterectomy and unilateral oophorectomy, last menstrual cycle: Unknown    PHQ-2 Depression Screening  Little interest or pleasure in doing things? 2-->more than half the days   Feeling down, depressed, or hopeless? 2-->more than half the days   PHQ-2 Total Score 4         Intimate partner violence: (Screen on initial visit, pregnant women, women with injuries, older adult with injury or evidence of neglect): denies any concerns  • Violence can be a problem in many people's lives, so I now ask every patient about trauma or abuse they may have experienced in a relationship.  • Stress/Safety - Do you feel safe in your relationship?  • Afraid/Abused - Have you ever been in a relationship where you were threatened, hurt, or afraid?  • Friend/Family - Are your friends aware you have been hurt?  • Emergency Plan - Do you have a safe place to go and the resources you need in an emergency?    Osteoporosis:   • Ost menopausal women < 65 with RF (advancing age, previous fracture, glucocorticoid therapy, parental hip fracture, low body weight, current cigarette smoking, excessive alcohol consumption, rheumatoid arthritis, secondary osteoporosis [hypogonadism/premature menopause, malabsorption, chronic liver disease, IBD]).  • All women 65 or older    Objective     Physical Exam:  Vital Signs:  "  Vitals:    07/29/22 1110   BP: 122/82   BP [facility]: Right arm   Patient Position: Sitting   Cuff Size: Adult   Pulse: 68   Resp: 18   Temp: 98.2 °F (36.8 °C)   TempSrc: Temporal   SpO2: 98%   Weight: 105 kg (232 lb 3.2 oz)   Height: 165.1 cm (65\")   PainSc:   4     Body mass index is 38.64 kg/m².    Physical Exam  Vitals and nursing note reviewed.   Constitutional:       General: She is not in acute distress.     Appearance: Normal appearance. She is normal weight. She is not ill-appearing or toxic-appearing.   HENT:      Nose: No congestion or rhinorrhea.   Eyes:      General:         Right eye: No discharge.         Left eye: No discharge.      Conjunctiva/sclera: Conjunctivae normal.   Cardiovascular:      Rate and Rhythm: Normal rate and regular rhythm.      Heart sounds: Normal heart sounds. No murmur heard.    No friction rub.   Pulmonary:      Effort: Pulmonary effort is normal. No respiratory distress.      Breath sounds: Normal breath sounds. No wheezing or rhonchi.   Abdominal:      General: Abdomen is flat. Bowel sounds are normal. There is no distension.      Palpations: Abdomen is soft. There is no mass.      Tenderness: There is no abdominal tenderness. There is no guarding or rebound.   Musculoskeletal:      Cervical back: Normal range of motion.      Right lower leg: No edema.      Left lower leg: No edema.   Skin:     Findings: No lesion or rash.   Neurological:      General: No focal deficit present.      Mental Status: She is alert. Mental status is at baseline.      Coordination: Coordination normal.      Gait: Gait normal.   Psychiatric:         Mood and Affect: Mood normal.         Behavior: Behavior normal.         Thought Content: Thought content normal.         Judgment: Judgment normal.         Procedures    Results:     Labs:   Hemoglobin A1C   Date Value Ref Range Status   07/17/2018 8.10 (H) 4.80 - 5.60 % Final     TSH   Date Value Ref Range Status   04/06/2018 1.245 0.350 - 5.350 " mIU/mL Final        Imaging:   No valid procedures specified.     Assessment / Plan      Assessment/Plan:   Problem List Items Addressed This Visit        Endocrine and Metabolic    Type 2 diabetes mellitus (HCC)    Current Assessment & Plan     Diabetes is Pending A1c result.   Dietary recommendations for ADA diet.  Regular aerobic exercise.  Discussed foot care.  Reminded to get yearly retinal exam.  Diabetes educator referral.  Nutritionist referral.   -   A1c reordered on 7/29/2022: Pending  -   Urine microalbumin: Pending  -   Initiate 7/29/2022: Ozempic 0.25 mg q. weekly for improved diabetic control and weight loss  -   Blood pressure is within normal range, but consider ACE/ARB initiation for cardiovascular and renal protection if needed  -   Already established with ophthalmology  Diabetes will be reassessed in 1 month.         Relevant Medications    Semaglutide,0.25 or 0.5MG/DOS, (OZEMPIC) 2 MG/1.5ML solution pen-injector    Other Relevant Orders    POC Glycosylated Hemoglobin (Hb A1C)    Ambulatory Referral to Diabetes Counseling    Ambulatory Referral to Exercise Education    Comprehensive Metabolic Panel    POC Microalbumin    Class 2 obesity due to excess calories with body mass index (BMI) of 38.0 to 38.9 in adult    Current Assessment & Plan     Patient's (Body mass index is 38.64 kg/m².) indicates that they are obese (BMI >30) with health conditions that include diabetes mellitus .   -   Status post bariatric surgery years prior to clinical exam on 7/29/2022  Weight is worsening. BMI is is above average; BMI management plan is completed. We discussed low calorie, low carb based diet program, portion control, increasing exercise, joining a fitness center or start home based exercise program and pharmacologic options including GLP-1 agonist.   -   Additional referrals made as noted below to exercise therapy and diabetic educator  -   Initiate 7/29/2022: Ozempic 0.25 mg every week, consider up  titration after approximately 4 weeks         Relevant Medications    Semaglutide,0.25 or 0.5MG/DOS, (OZEMPIC) 2 MG/1.5ML solution pen-injector    Other Relevant Orders    Ambulatory Referral to Diabetes Counseling    Ambulatory Referral to Exercise Education    TSH       Genitourinary and Reproductive     Menopausal vasomotor syndrome    Current Assessment & Plan     -   Status post unilateral oophorectomy years prior to clinical exam on 7/29/2022  -   Patient with vasomotor symptoms and mood abnormalities possibly indicative of early menopausal signs, although years earlier than would be expected even with unilateral ovary  -   Patient without known menstrual cycle changes because of a previous total hysterectomy  Plan:  -   Additional work-up completed as noted below  -   Consider obtaining prolactin level in addition to below work-up with specific abnormalities           Relevant Orders    Estrogens, Total    FSH & LH    HIV-1 / O / 2 Ag / Antibody 4th Generation       Hematology and Neoplasia    Iron deficiency anemia    Current Assessment & Plan     -   Required IV iron infusions years prior to clinical exam on 7/29/2022  -   Patient notes that she has been more fatigued recently, but is unsure about recent anemia levels  -   Denies previous improvement with p.o. intake and therefore iron infusions were administered  Plan:  -   CBC, ferritin and additional work-up as noted below ordered on 7/29/2022: Pending  -   Consider p.o. or IV infusion pending results as noted above         Relevant Orders    CBC & Differential    Ferritin    Vitamin B12       Mental Health    Anxiety and depression    Current Assessment & Plan     Patient's depression is recurrent and is moderate without psychosis.   -   Previously did not have appropriate reactions to citalopram, sertraline, or bupropion   Their depression is currently active and the condition is worsening. This will be reassessed at the next regular appointment. F/U  as described:patient was prescribed an antidepressant medicine and patient referred to Mental Health Specialist.  -   Initiate 7/29/2022: Lexapro 10 mg daily  -   Referral made to behavioral health on 7/29/2022 for comprehensive psychotherapy and additional medication management         Relevant Medications    escitalopram (Lexapro) 10 MG tablet    nicotine (NICODERM CQ) 14 MG/24HR patch    Other Relevant Orders    Ambulatory Referral to Behavioral Health    TSH       Neuro    Lumbosacral radiculopathy    Current Assessment & Plan     -   Patient with worsening sciatic symptoms prior to clinical exam on 7/29/2022  -   Likely related to weight gain as experienced between her previous visit with a physician and establishing care on 7/29/2022  -   Patient declined physical therapy on 7/29/2022  -   Patient without saddle paresthesias or overt bilateral lower extremity weakness or unilateral lower extremity weakness during physical exam on 7/29/2022  Plan:  -   Referral made to exercise therapy on 7/29/2022 for additional evaluation and management  -   Consider additional physical therapy orders in the future for directed and structured exercise regimens  -   No indication for imaging at this time         Relevant Orders    Folate       Tobacco    Tobacco use disorder    Current Assessment & Plan     Carlos Heller  reports that she has been smoking cigarettes. She has a 10.00 pack-year smoking history. She has never used smokeless tobacco.. I have educated her on the risk of diseases from using tobacco products such as cancer, COPD and heart disease.     I advised her to quit and she is willing to quit. We have discussed the following method/s for tobacco cessation:  Counseling Prescription Medicaiton.  Together we have set a quit date for As soon as possible.  She will follow up with me in 1 month or sooner to check on her progress.    I spent 5 minutes counseling the patient.              Relevant  Medications    nicotine (NICODERM CQ) 14 MG/24HR patch      Other Visit Diagnoses     Healthcare maintenance    -  Primary    Relevant Orders    POC Glycosylated Hemoglobin (Hb A1C)    Comprehensive Metabolic Panel    Hepatitis C Antibody    TSH          Healthcare Maintenance:  Counseling provided based on age appropriate USPSTF guidelines.  Class 2 Severe Obesity (BMI >=35 and <=39.9). Obesity-related health conditions include the following: diabetes mellitus. Obesity is worsening. BMI is is above average; BMI management plan is completed. We discussed low calorie, low carb based diet program, portion control, increasing exercise, joining a fitness center or start home based exercise program and pharmacologic options including GLP-1 agonist.    Carlos Heller voices understanding and acceptance of this advice and will call back with any further questions or concerns. AVS with preventive healthcare tips printed for patient.     Follow Up:   Return in about 4 weeks (around 8/26/2022) for chronic care management.    Virgil Barkley MD  [facility]

## 2022-07-29 NOTE — ASSESSMENT & PLAN NOTE
Patient's depression is recurrent and is moderate without psychosis.   -   Previously did not have appropriate reactions to citalopram, sertraline, or bupropion   Their depression is currently active and the condition is worsening. This will be reassessed at the next regular appointment. F/U as described:patient was prescribed an antidepressant medicine and patient referred to Mental Health Specialist.  -   Initiate 7/29/2022: Lexapro 10 mg daily  -   Referral made to behavioral health on 7/29/2022 for comprehensive psychotherapy and additional medication management

## 2022-07-29 NOTE — ASSESSMENT & PLAN NOTE
-   Status post unilateral oophorectomy years prior to clinical exam on 7/29/2022  -   Patient with vasomotor symptoms and mood abnormalities possibly indicative of early menopausal signs, although years earlier than would be expected even with unilateral ovary  -   Patient without known menstrual cycle changes because of a previous total hysterectomy  Plan:  -   Additional work-up completed as noted below  -   Consider obtaining prolactin level in addition to below work-up with specific abnormalities

## 2022-07-29 NOTE — ASSESSMENT & PLAN NOTE
Patient's (Body mass index is 38.64 kg/m².) indicates that they are obese (BMI >30) with health conditions that include diabetes mellitus .   -   Status post bariatric surgery years prior to clinical exam on 7/29/2022  Weight is worsening. BMI is is above average; BMI management plan is completed. We discussed low calorie, low carb based diet program, portion control, increasing exercise, joining a fitness center or start home based exercise program and pharmacologic options including GLP-1 agonist.   -   Additional referrals made as noted below to exercise therapy and diabetic educator  -   Initiate 7/29/2022: Ozempic 0.25 mg every week, consider up titration after approximately 4 weeks

## 2022-07-29 NOTE — ASSESSMENT & PLAN NOTE
-   Required IV iron infusions years prior to clinical exam on 7/29/2022  -   Patient notes that she has been more fatigued recently, but is unsure about recent anemia levels  -   Denies previous improvement with p.o. intake and therefore iron infusions were administered  Plan:  -   CBC, ferritin and additional work-up as noted below ordered on 7/29/2022: Pending  -   Consider p.o. or IV infusion pending results as noted above

## 2022-07-29 NOTE — ASSESSMENT & PLAN NOTE
Diabetes is Pending A1c result.   Dietary recommendations for ADA diet.  Regular aerobic exercise.  Discussed foot care.  Reminded to get yearly retinal exam.  Diabetes educator referral.  Nutritionist referral.   -   A1c reordered on 7/29/2022: Pending  -   Urine microalbumin: Pending  -   Initiate 7/29/2022: Ozempic 0.25 mg q. weekly for improved diabetic control and weight loss  -   Blood pressure is within normal range, but consider ACE/ARB initiation for cardiovascular and renal protection if needed  -   Already established with ophthalmology  Diabetes will be reassessed in 1 month.

## 2022-07-29 NOTE — ASSESSMENT & PLAN NOTE
Carlos Heller  reports that she has been smoking cigarettes. She has a 10.00 pack-year smoking history. She has never used smokeless tobacco.. I have educated her on the risk of diseases from using tobacco products such as cancer, COPD and heart disease.     I advised her to quit and she is willing to quit. We have discussed the following method/s for tobacco cessation:  Counseling Prescription Medicaiton.  Together we have set a quit date for As soon as possible.  She will follow up with me in 1 month or sooner to check on her progress.    I spent 5 minutes counseling the patient.

## 2022-07-29 NOTE — ASSESSMENT & PLAN NOTE
-   Patient with worsening sciatic symptoms prior to clinical exam on 7/29/2022  -   Likely related to weight gain as experienced between her previous visit with a physician and establishing care on 7/29/2022  -   Patient declined physical therapy on 7/29/2022  -   Patient without saddle paresthesias or overt bilateral lower extremity weakness or unilateral lower extremity weakness during physical exam on 7/29/2022  Plan:  -   Referral made to exercise therapy on 7/29/2022 for additional evaluation and management  -   Consider additional physical therapy orders in the future for directed and structured exercise regimens  -   No indication for imaging at this time

## 2022-08-01 ENCOUNTER — TELEPHONE (OUTPATIENT)
Dept: INTERNAL MEDICINE | Facility: CLINIC | Age: 42
End: 2022-08-01

## 2022-08-01 DIAGNOSIS — E66.01 CLASS 2 SEVERE OBESITY DUE TO EXCESS CALORIES WITH SERIOUS COMORBIDITY AND BODY MASS INDEX (BMI) OF 38.0 TO 38.9 IN ADULT: ICD-10-CM

## 2022-08-01 DIAGNOSIS — E11.319 TYPE 2 DIABETES MELLITUS WITH RETINOPATHY OF LEFT EYE, WITHOUT LONG-TERM CURRENT USE OF INSULIN, MACULAR EDEMA PRESENCE UNSPECIFIED, UNSPECIFIED RETINOPATHY SEVERITY: ICD-10-CM

## 2022-08-01 NOTE — TELEPHONE ENCOUNTER
Caller: Carlos Heller    Relationship to patient: Self     Best call back number: [phone]    Patient is needing: PATIENT STATED THAT IN[facility]ANCE WILL NOT PAY FOR Semaglutide,0.25 or 0.5MG/DOS, (OZEMPIC) 2 MG/1.5ML solution pen-injector AND PATIENT HAS TRIED THINGS IN PAST THAT DOES NOT WORK BUT PROVIDER WOULD NEED TO OVERRIDE SO THAT PATIENT COULD GET THIS MEDICATION     PLEASE ADVISE

## 2022-08-02 NOTE — TELEPHONE ENCOUNTER
Your PA has been faxed to the plan as a paper copy. Please contact the plan directly if you haven't received a determination in a typical timeframe.    You will be notified of the determination electronically and via fax.    Contact plan to follow up on [id number]

## 2022-08-04 LAB
ALBUMIN SERPL-MCNC: 4.1 G/DL (ref 3.8–4.8)
ALBUMIN/GLOB SERPL: 1.5 {RATIO} (ref 1.2–2.2)
ALP SERPL-CCNC: 47 IU/L (ref 44–121)
ALT SERPL-CCNC: 10 IU/L (ref 0–32)
AST SERPL-CCNC: 10 IU/L (ref 0–40)
BASOPHILS # BLD AUTO: 0 X10E3/UL (ref 0–0.2)
BASOPHILS NFR BLD AUTO: 0 %
BILIRUB SERPL-MCNC: 0.7 MG/DL (ref 0–1.2)
BUN SERPL-MCNC: 7 MG/DL (ref 6–24)
BUN/CREAT SERPL: 9 (ref 9–23)
CALCIUM SERPL-MCNC: 9.2 MG/DL (ref 8.7–10.2)
CHLORIDE SERPL-SCNC: 107 MMOL/L (ref 96–106)
CHOLEST SERPL-MCNC: 190 MG/DL (ref 100–199)
CO2 SERPL-SCNC: 24 MMOL/L (ref 20–29)
CREAT SERPL-MCNC: 0.8 MG/DL (ref 0.57–1)
EGFRCR SERPLBLD CKD-EPI 2021: 95 ML/MIN/1.73
EOSINOPHIL # BLD AUTO: 0.4 X10E3/UL (ref 0–0.4)
EOSINOPHIL NFR BLD AUTO: 5 %
ERYTHROCYTE [DISTWIDTH] IN BLOOD BY AUTOMATED COUNT: 13.1 % (ref 11.7–15.4)
ESTROGEN SERPL-MCNC: 190 PG/ML
FERRITIN SERPL-MCNC: 151 NG/ML (ref 15–150)
FOLATE SERPL-MCNC: 5.8 NG/ML
FSH SERPL-ACNC: 5.6 MIU/ML
GLOBULIN SER CALC-MCNC: 2.8 G/DL (ref 1.5–4.5)
GLUCOSE SERPL-MCNC: 62 MG/DL (ref 65–99)
HCT VFR BLD AUTO: 45.1 % (ref 34–46.6)
HCV AB S/CO SERPL IA: 0.1 S/CO RATIO (ref 0–0.9)
HDLC SERPL-MCNC: 42 MG/DL
HGB BLD-MCNC: 14.5 G/DL (ref 11.1–15.9)
IMM GRANULOCYTES # BLD AUTO: 0 X10E3/UL (ref 0–0.1)
IMM GRANULOCYTES NFR BLD AUTO: 0 %
LDLC SERPL CALC-MCNC: 117 MG/DL (ref 0–99)
LH SERPL-ACNC: 3.2 MIU/ML
LYMPHOCYTES # BLD AUTO: 2.4 X10E3/UL (ref 0.7–3.1)
LYMPHOCYTES NFR BLD AUTO: 34 %
MCH RBC QN AUTO: 27.3 PG (ref 26.6–33)
MCHC RBC AUTO-ENTMCNC: 32.2 G/DL (ref 31.5–35.7)
MCV RBC AUTO: 85 FL (ref 79–97)
MONOCYTES # BLD AUTO: 0.6 X10E3/UL (ref 0.1–0.9)
MONOCYTES NFR BLD AUTO: 9 %
NEUTROPHILS # BLD AUTO: 3.7 X10E3/UL (ref 1.4–7)
NEUTROPHILS NFR BLD AUTO: 52 %
PLATELET # BLD AUTO: 231 X10E3/UL (ref 150–450)
POTASSIUM SERPL-SCNC: 4.1 MMOL/L (ref 3.5–5.2)
PROT SERPL-MCNC: 6.9 G/DL (ref 6–8.5)
RBC # BLD AUTO: 5.31 X10E6/UL (ref 3.77–5.28)
SODIUM SERPL-SCNC: 143 MMOL/L (ref 134–144)
TRIGL SERPL-MCNC: 178 MG/DL (ref 0–149)
TSH SERPL DL<=0.005 MIU/L-ACNC: 1 UIU/ML (ref 0.45–4.5)
VLDLC SERPL CALC-MCNC: 31 MG/DL (ref 5–40)
WBC # BLD AUTO: 7.1 X10E3/UL (ref 3.4–10.8)

## 2022-08-08 ENCOUNTER — OFFICE VISIT (OUTPATIENT)
Dept: BEHAVIORAL HEALTH | Facility: CLINIC | Age: 42
End: 2022-08-08

## 2022-08-08 VITALS
WEIGHT: 236.4 LBS | SYSTOLIC BLOOD PRESSURE: 124 MMHG | DIASTOLIC BLOOD PRESSURE: 72 MMHG | HEIGHT: 65 IN | BODY MASS INDEX: 39.39 KG/M2

## 2022-08-08 DIAGNOSIS — Z72.0 TOBACCO USE: ICD-10-CM

## 2022-08-08 DIAGNOSIS — F31.62 BIPOLAR DISORDER, CURRENT EPISODE MIXED, MODERATE: Primary | ICD-10-CM

## 2022-08-08 DIAGNOSIS — F50.81 BINGE EATING DISORDER: ICD-10-CM

## 2022-08-08 DIAGNOSIS — G47.00 INSOMNIA, UNSPECIFIED TYPE: ICD-10-CM

## 2022-08-08 DIAGNOSIS — F41.0 PANIC ATTACKS: ICD-10-CM

## 2022-08-08 PROCEDURE — 90792 PSYCH DIAG EVAL W/MED SRVCS: CPT

## 2022-08-08 RX ORDER — NICOTINE 21 MG/24HR
1 PATCH, TRANSDERMAL 24 HOURS TRANSDERMAL EVERY 24 HOURS
Qty: 28 PATCH | Refills: 3 | Status: SHIPPED | OUTPATIENT
Start: 2022-08-08 | End: 2023-03-29

## 2022-08-08 RX ORDER — TRAZODONE HYDROCHLORIDE 50 MG/1
50 TABLET ORAL NIGHTLY PRN
Qty: 30 TABLET | Refills: 1 | Status: SHIPPED | OUTPATIENT
Start: 2022-08-08

## 2022-08-08 NOTE — PROGRESS NOTES
"     New Patient Office Visit      Patient Name: Carlos Heller  : 1980   MRN: [id number]     Referring Provider: Virgil Barkley MD    Chief Complaint:  Psychiatric evaluation related to:     ICD-10-CM ICD-9-CM   1. Bipolar disorder, current episode mixed, moderate (HCC)  F31.62 296.62   2. Insomnia, unspecified type  G47.00 780.52   3. Panic attacks  F41.0 300.01   4. Tobacco use  Z72.0 305.1   5. Binge eating disorder  F50.81 307.50        History of Present Illness:   Carlos Heller is a 42 y.o. female who is here today for psychiatric evaluation on referral from primary care provider.  Patient reports she is seeking help because her family believes she needs it.  She complains of irritability and anger which is nearly constant.  She has been diagnosed with bipolar disorder from primary care and trialed on several antidepressants, which have been ineffective.    Bipolar disorder, mixed:  Patient has dealt with depression since age 18.  She reports a history of low mood, poor self-esteem, feelings of worthlessness and loneliness.  She also reports a history of suicidal ideation, none in the past 3 years.  She also reports over the past 8 years she has had episodes of irritability/anger which come in the last 2 to 3 weeks.  During these episodes she reports irritability/anger which is constant and uncontrollable, she also has decreased motivation and decreased energy during these times.  She reports she will yell/cuss at her children \"all of the time\".  She reports she has tried to stop this but cannot do so.  She reports her kids will call her \"mean, and evil\".  She reports these episodes have been worsening over the past few years.  She reports these will be followed by a few days up to a week of feeling \"normal\" or happy.  During these episodes she has less irritability, less anger and is able to spend time with her children.    Insomnia:  Patient reports history of insomnia her whole " life.  She reports she will awaken several times at night.  Over the past 6 months she has been eating full meals several times per night.  She is waking at least 3 times throughout the night.    Binge eating:  Patient reports a history of severe obesity, weighing over 400 pounds.  History of bariatric surgery.  Over the past 6 months she has been binge eating to the point she feels uncomfortable.  She has also been waking several times per night to eat.  She reports it feels as if she cannot control her eating and is unsure of why she does it.    Anxiety/panic:  Patient endorses panic attacks which occur occasionally.  She reports this is accompanied by heightened anxiety, a feeling of racing heart and dread.  She has been prescribed hydroxyzine in the past which made her sleepy.  She now takes Benadryl as needed which is effective.    Current treatment(s): Lexapro 10 mg daily, Benadryl OTC as needed for anxiety    Subjective      Review of Systems:   Review of Systems   Psychiatric/Behavioral: Positive for sleep disturbance. Negative for dysphoric mood, self-injury, suicidal ideas and depressed mood. The patient is not nervous/anxious.        PHQ-9 Depression Screening Score: 11     Psychiatric History:     Previous Diagnoses: Bipolar Disorder   Outpatient treatment history: Never seen psychiatric provider, medications previously managed by primary care  Previous medications: Prozac (x2 years, ineffective), Zoloft (x1 year, ineffective), citalopram (ineffective), Wellbutrin (ineffective)   Inpatient admissions: None  History of suicide attempts: None  Trauma/Abuse History: None    SUICIDE RISK ASSESSMENT    Does patient have thoughts of suicide? No  Does patient have intent for suicide? No  Does patient have a current plan for suicide? No    Access to firearms or weapons: Unasessed   History of suicide attempts: No  Family history of suicide or attempts: Unknown  Protective factors: No suicidal ideation, adequate  support system    Risk Level: Low    SAFETY PLAN    Patient agrees to call 911/go to the nearest emergency department if he/she develops new or worsening SI, or develops intent or plan to act on these thoughts. Patient is agreeable to all elements of safety plan and verbalizes understanding.     Social History:    Living situation: [place] and three children (aged 18, 14, 13)  Work/school: None current  Recreation: Unassessed  Support system: Family  Illicit substance use: Denies  Alcohol use: Denied  Tobacco use: 2 packs/day, trying to quit, utilizing patches    Past Medical History:   Past Medical History:   Diagnosis Date   • Abscess of vulva 2016   • Carpal tunnel syndrome     NSAIDS daily, also gets steroid injection of right hand, last2018   • Depression    • Elevated ALT measurement    • Fatigue    • Fibromyalgia     patient thinks related to obesity   • Fibromyalgia    • Former smoker    • GERD (gastroesophageal reflux disease)     omeprazole BID and tums prn.  EGD GDW  40 cm, no HH, path sugg IGNACIO. serum h. pyl neg   • GERD (gastroesophageal reflux disease)    • History of bariatric surgery 2018   • Hx MRSA infection     on (L) leg, treated w/ I&D, IV abx.      • Hyperlipidemia    • Joint pain     back, shoulder, hips. Recented Rx mobic. Steroid Injection of  L knee 3/2018   • Morbid obesity (HCC)    • ADDI (obstructive sleep apnea)     semi compliant with CPAP   • Psoriasis of scalp    • Pulmonary embolism (HCC)     unknown etiology, tx w/ coumadin   • Spinal headache    • Type 2 diabetes mellitus (HCC)     dx , follows w/ Endocrinology, on insulin since  checks sugar once a week   • Wears glasses    • Wears glasses        Past Surgical History:   Past Surgical History:   Procedure Laterality Date   • ABDOMINAL HYSTERECTOMY      d/t postpartum bleeding  with ,  partial    • BILATERAL BREAST REDUCTION     • CARPAL TUNNEL RELEASE Left 2015   •  SECTION  ,  2009    x2, horizontal   • ENDOSCOPY N/A 2/2/2017    Procedure: ESOPHAGOGASTRODUODENOSCOPY;  Surgeon: Jean Cardozo MD;  [facility]:  [facility];  Service:    • ENDOSCOPY N/A 7/18/2018    Procedure: ESOPHAGOGASTRODUODENOSCOPY;  Surgeon: Jean Cardozo MD;  [facility]:  [facility];  Service: Bariatric   • GASTRIC SLEEVE LAPAROSCOPIC N/A 7/18/2018    Procedure: GASTRIC SLEEVE LAPAROSCOPICHIATAL;  Surgeon: Jean Cardozo MD;  [facility]:  [facility];  Service: Bariatric   • LAPAROSCOPIC SALPINGOOPHERECTOMY  2000    for tubal pregnancy   • OTHER SURGICAL HISTORY      denies anesthesia issues   • WISDOM TOOTH EXTRACTION  1998       Family History:   Family History   Problem Relation Age of Onset   • Diabetes Mother 35   • Hypertension Brother    • Sleep apnea Brother    • No Known Problems Father    • No Known Problems Sister    • No Known Problems Maternal Grandmother    • No Known Problems Maternal Grandfather    • No Known Problems Paternal Grandmother    • No Known Problems Paternal Grandfather        Family Psychiatric History:  Maternal aunt: Schizoaffective disorder, severe mental illness  Maternal aunt: Bipolar disorder    Medications:     Current Outpatient Medications:   •  Cariprazine HCl (Vraylar) 1.5 MG capsule capsule, Take 1 capsule by mouth Every Other Day for 14 days, THEN 1 capsule Daily., Disp: 30 capsule, Rfl: 1  •  nicotine (NICODERM CQ) 21 MG/24HR patch, Place 1 patch on the skin as directed by provider Daily., Disp: 28 patch, Rfl: 3  •  Semaglutide,0.25 or 0.5MG/DOS, (OZEMPIC) 2 MG/1.5ML solution pen-injector, Inject 0.25 mg under the skin into the appropriate area as directed 1 (One) Time Per Week., Disp: 5 pen, Rfl: 2  •  traZODone (DESYREL) 50 MG tablet, Take 1 tablet by mouth At Night As Needed for Sleep., Disp: 30 tablet, Rfl: 1    Allergies:   Allergies   Allergen Reactions   • Influenza Vaccines GI Intolerance   • Wellbutrin [Bupropion] Palpitations         Objective     Physical  "Exam:  Vital Signs:   Vitals:    08/08/22 1429   BP: 124/72  Comment: 99/65   Weight: 107 kg (236 lb 6.4 oz)   Height: 165.1 cm (65\")     Body mass index is 39.34 kg/m².     Physical Exam    Mental Status Exam:   Appearance: This is an obese -American middle-aged female, appears stated age, dressed appropriately to situation and weather  Hygiene: Good  Cooperation: Good  Eye Contact: Within normal limits  Psychomotor Behavior: Within normal limits  Mood: Euthymic  Affect: Congruent, flat  Speech: Normal rate, tone and prosody  Thought Process: Linear, goal directed  Thought Content: Congruent  Suicidal: Denies  Homicidal: Denies  Hallucinations: Denies  Delusion: Denies  Memory: Intact  Orientation: X4  Reliability: Good  Insight: Fair  Judgement: Fair  Impulse Control: Concern for anger outburst    Assessment / Plan      Visit Diagnosis/Orders Placed This Visit:  Diagnoses and all orders for this visit:    1. Bipolar disorder, current episode mixed, moderate (HCC) (Primary)  -     Cariprazine HCl (Vraylar) 1.5 MG capsule capsule; Take 1 capsule by mouth Every Other Day for 14 days, THEN 1 capsule Daily.  Dispense: 30 capsule; Refill: 1    2. Insomnia, unspecified type  -     traZODone (DESYREL) 50 MG tablet; Take 1 tablet by mouth At Night As Needed for Sleep.  Dispense: 30 tablet; Refill: 1    3. Panic attacks    4. Tobacco use  -     nicotine (NICODERM CQ) 21 MG/24HR patch; Place 1 patch on the skin as directed by provider Daily.  Dispense: 28 patch; Refill: 3    5. Binge eating disorder         Differential:   Rule out intermittent explosive disorder    Plan:   1. Discontinue escitalopram  2. Start cariprazine 1.5 mg every other day for 2 weeks then increase to 1.5 mg daily  3. Start trazodone 50 mg as needed  4. Consider psychotherapy referral at subsequent visits    Continue supportive psychotherapy efforts and medications as indicated. Treatment and medication options discussed during today's visit. " Patient ackowledged and verbally consented to continue with current treatment plan and was educated on the importance of compliance with treatment and follow-up appointments. Patient seems reasonably able to adhere to treatment plan.      Medication Considerations:  Discussed medication options and treatment plan of prescribed medication(s) as well as the risks, benefits, and potential side effects. Patient is agreeable to call the office with any worsening of symptoms or onset of side effects. Patient is agreeable to call 911 or go to the nearest ER should he/she begin having SI/HI.    Treatment Goals:    Bipolar disorder: Patient will experience improvement in symptoms of bipolar disorder including improvements in anger/irritability and depressive symptoms with cariprazine.  We will consider therapy at subsequent visits.  Insomnia: Patient will experience improvement in insomnia with trazodone      Quality Measures:     Tobacco Use/Cessation:   Current every day smoker less than 3 minutes spent counseling Will try to cut down    I advised Carlos Heller of the risks of tobacco use.     Follow Up:   Return in about 4 weeks (around 9/5/2022) for Medication Mangement Follow Up.      PATRICIA Leos, PMHNP-BC

## 2022-08-11 NOTE — TELEPHONE ENCOUNTER
Left voice mail message for Pt to call office for message on Ozempic. Office number given.     Hub okay to let Pt know.  Reached [facility] pharmacy prescription is covered and filled for Pt.

## 2022-08-24 ENCOUNTER — TELEPHONE (OUTPATIENT)
Dept: INTERNAL MEDICINE | Facility: CLINIC | Age: 42
End: 2022-08-24

## 2022-08-26 ENCOUNTER — TELEPHONE (OUTPATIENT)
Dept: BEHAVIORAL HEALTH | Facility: CLINIC | Age: 42
End: 2022-08-26

## 2022-08-26 NOTE — TELEPHONE ENCOUNTER
Called patient back to discuss medication concerns.  Patient reports that she has had no change in sleep since starting trazodone.  Patient is still waking up several times throughout the night.  Additionally, the patient has still not started taking cariprazine due to fears of increased anxiety.  We discussed concerns and I encouraged patient to start medication.    Plan:  1.  Increase trazodone to 100 mg nightly as needed  2.  Patient to start cariprazine

## 2022-09-01 ENCOUNTER — APPOINTMENT (OUTPATIENT)
Dept: OTHER | Facility: HOSPITAL | Age: 42
End: 2022-09-01

## 2022-11-11 ENCOUNTER — OFFICE VISIT (OUTPATIENT)
Dept: INTERNAL MEDICINE | Facility: CLINIC | Age: 42
End: 2022-11-11

## 2022-11-11 VITALS
WEIGHT: 238.13 LBS | HEART RATE: 68 BPM | DIASTOLIC BLOOD PRESSURE: 78 MMHG | RESPIRATION RATE: 20 BRPM | TEMPERATURE: 98.2 F | BODY MASS INDEX: 39.63 KG/M2 | SYSTOLIC BLOOD PRESSURE: 128 MMHG | OXYGEN SATURATION: 100 %

## 2022-11-11 DIAGNOSIS — R06.2 WHEEZE: Primary | ICD-10-CM

## 2022-11-11 DIAGNOSIS — R05.1 ACUTE COUGH: ICD-10-CM

## 2022-11-11 DIAGNOSIS — J02.9 SORE THROAT: ICD-10-CM

## 2022-11-11 DIAGNOSIS — B96.89 BACTERIAL SINUSITIS: ICD-10-CM

## 2022-11-11 DIAGNOSIS — J32.9 BACTERIAL SINUSITIS: ICD-10-CM

## 2022-11-11 LAB
EXPIRATION DATE: NORMAL
EXPIRATION DATE: NORMAL
FLUAV AG UPPER RESP QL IA.RAPID: NOT DETECTED
FLUBV AG UPPER RESP QL IA.RAPID: NOT DETECTED
INTERNAL CONTROL: NORMAL
INTERNAL CONTROL: NORMAL
Lab: NORMAL
Lab: NORMAL
S PYO AG THROAT QL: NEGATIVE
SARS-COV-2 RNA RESP QL NAA+PROBE: NOT DETECTED

## 2022-11-11 PROCEDURE — 87081 CULTURE SCREEN ONLY: CPT | Performed by: STUDENT IN AN ORGANIZED HEALTH CARE EDUCATION/TRAINING PROGRAM

## 2022-11-11 PROCEDURE — 87428 SARSCOV & INF VIR A&B AG IA: CPT | Performed by: STUDENT IN AN ORGANIZED HEALTH CARE EDUCATION/TRAINING PROGRAM

## 2022-11-11 PROCEDURE — 87880 STREP A ASSAY W/OPTIC: CPT | Performed by: STUDENT IN AN ORGANIZED HEALTH CARE EDUCATION/TRAINING PROGRAM

## 2022-11-11 PROCEDURE — 99214 OFFICE O/P EST MOD 30 MIN: CPT | Performed by: STUDENT IN AN ORGANIZED HEALTH CARE EDUCATION/TRAINING PROGRAM

## 2022-11-11 RX ORDER — SULFAMETHOXAZOLE AND TRIMETHOPRIM 800; 160 MG/1; MG/1
TABLET ORAL
COMMUNITY
Start: 2022-08-18 | End: 2022-11-11

## 2022-11-11 RX ORDER — AZITHROMYCIN 250 MG/1
TABLET, FILM COATED ORAL
Qty: 6 TABLET | Refills: 0 | Status: SHIPPED | OUTPATIENT
Start: 2022-11-11 | End: 2023-03-29

## 2022-11-11 RX ORDER — HYDROCODONE BITARTRATE AND ACETAMINOPHEN 5; 325 MG/1; MG/1
TABLET ORAL
COMMUNITY
Start: 2022-08-18 | End: 2022-11-11

## 2022-11-11 RX ORDER — NICOTINE 21 MG/24HR
PATCH, TRANSDERMAL 24 HOURS TRANSDERMAL
COMMUNITY
Start: 2022-08-25 | End: 2023-03-29

## 2022-11-11 NOTE — PROGRESS NOTES
Follow Up Office Visit      Date: 2022   Patient Name: Carlos Heller  : 1980   MRN: [id number]     Chief Complaint:    Chief Complaint   Patient presents with   • Sore Throat   • Wheezing   • Dizziness       History of Present Illness: Carlos Heller is a 42 y.o. female with past medical history of pulmonary embolism, hyperlipidemia, type 2 diabetes (non-insulin-dependent) GERD, iron deficiency anemia, bipolar disorder on Vraylar, fibromyalgia, psoriasis, hidradenitis suppurativa, tobacco use disorder who is here today for sore throat congestion and cough..    HPI  Patient reports that her daughter was ill approximately 1 week ago with fatigue, myalgias, congestion and cough.  Over the past 4 to 5 days she is started experiencing similar symptoms including sinus congestion, mildly productive cough, headache described as head fullness/dizziness (though denies room spinning or lightheaded sensation), and right sinus pain.  She has tried taking Mucinex max, NyQuil with mild improvement of symptoms.  Denies chest pain, trouble breathing, wheezing, fever, chills.  Endorses scratchy sore throat.    Subjective      Review of Systems:   Review of Systems    I have reviewed the patients family history, social history, past medical history, past surgical history and have updated it as appropriate.     Medications:     Current Outpatient Medications:   •  Cariprazine HCl (Vraylar) 1.5 MG capsule capsule, Take 1 capsule by mouth Every Other Day for 14 days, THEN 1 capsule Daily., Disp: 30 capsule, Rfl: 1  •  nicotine (NICODERM CQ) 14 MG/24HR patch, , Disp: , Rfl:   •  nicotine (NICODERM CQ) 21 MG/24HR patch, Place 1 patch on the skin as directed by provider Daily., Disp: 28 patch, Rfl: 3  •  Semaglutide,0.25 or 0.5MG/DOS, (OZEMPIC) 2 MG/1.5ML solution pen-injector, Inject 0.25 mg under the skin into the appropriate area as directed 1 (One) Time Per Week., Disp: 5 pen, Rfl: 2  •  traZODone  (DESYREL) 50 MG tablet, Take 1 tablet by mouth At Night As Needed for Sleep., Disp: 30 tablet, Rfl: 1  •  azithromycin (Zithromax Z-Raymon) 250 MG tablet, Take 2 tablets by mouth on day 1, then 1 tablet daily on days 2-5, Disp: 6 tablet, Rfl: 0    Allergies:   Allergies   Allergen Reactions   • Influenza Vaccines GI Intolerance   • Wellbutrin [Bupropion] Palpitations       Objective     Physical Exam: Please see above  Vital Signs:   Vitals:    11/11/22 1501   BP: 128/78   BP [facility]: Left arm   Patient Position: Sitting   Cuff Size: Adult   Pulse: 68   Resp: 20   Temp: 98.2 °F (36.8 °C)   TempSrc: Temporal   SpO2: 100%   Weight: 108 kg (238 lb 2 oz)   PainSc: 0-No pain     Body mass index is 39.63 kg/m².    Physical Exam  Vitals reviewed.   Constitutional:       General: She is not in acute distress.     Appearance: Normal appearance. She is obese. She is ill-appearing. She is not toxic-appearing.   HENT:      Head: Normocephalic and atraumatic.      Right Ear: External ear normal.      Left Ear: External ear normal.      Ears:      Comments: Erythematous tympanic membrane's bilaterally with serous effusions     Nose: Congestion and rhinorrhea present.      Comments: Boggy erythematous nasal turbinates bilaterally.  Pain to palpation of right maxillary sinus     Mouth/Throat:      Mouth: Mucous membranes are moist.      Pharynx: Posterior oropharyngeal erythema present. No oropharyngeal exudate.      Comments: Erythematous soft palate and tonsils bilaterally no exudates.  Eyes:      General: No scleral icterus.     Extraocular Movements: Extraocular movements intact.      Pupils: Pupils are equal, round, and reactive to light.   Cardiovascular:      Rate and Rhythm: Normal rate and regular rhythm.      Pulses: Normal pulses.      Heart sounds: Normal heart sounds. No murmur heard.    No friction rub. No gallop.   Pulmonary:      Effort: Pulmonary effort is normal.      Breath sounds: Normal breath sounds.    Abdominal:      General: Abdomen is flat. There is no distension.      Palpations: Abdomen is soft.   Musculoskeletal:      Cervical back: Normal range of motion. No rigidity or tenderness.   Lymphadenopathy:      Cervical: No cervical adenopathy.   Skin:     General: Skin is warm and dry.      Capillary Refill: Capillary refill takes less than 2 seconds.      Findings: No erythema or rash.   Neurological:      General: No focal deficit present.      Mental Status: She is alert and oriented to person, place, and time.   Psychiatric:         Mood and Affect: Mood normal.         Behavior: Behavior normal.         Judgment: Judgment normal.         Procedures    Results:   Labs:   Hemoglobin A1C   Date Value Ref Range Status   07/29/2022 5.7 % Final   07/17/2018 8.10 (H) 4.80 - 5.60 % Final     TSH   Date Value Ref Range Status   07/29/2022 0.999 0.450 - 4.500 uIU/mL Final        Imaging:   No valid procedures specified.     Assessment / Plan      Assessment/Plan:   Patient is a 42-year-old female with complex past medical history as above who presents with 4 to 5 days of worsening sinus congestion, cough, headache.  Exam is notable for exquisite right maxillary sinus tenderness and erythematous edematous nasal turbinates.  This raises suspicion for viral URI with secondary bacterial infection.  Discussed treating sinusitis with Augmentin, patient refused, prefers azithromycin.  Z-Raymon prescribed.  Also tested for COVID-19, influenza, strep.  Counseled patient on supportive care and only using either Mucinex max or NyQuil as both of these contain acetaminophen and dextromethorphan.  Okay for patient to utilize ibuprofen in addition.  Counseled on return precautions and indications to seek emergency care, patient voiced understanding.  Problem List Items Addressed This Visit    None  Visit Diagnoses     Wheeze    -  Primary    Acute cough        Relevant Orders    Covid-19 + Flu A&B AG, Veritor    Bacterial sinusitis         Relevant Medications    azithromycin (Zithromax Z-Raymon) 250 MG tablet    Sore throat        Relevant Orders    POCT rapid strep A    Beta Strep Culture, Throat - Swab, Throat            Follow Up:   Return in about 2 weeks (around 11/25/2022) for Recheck diabetes with Dr. Barkley.      Jesse Ramirez MD  [facility]

## 2022-11-13 LAB — BACTERIA SPEC AEROBE CULT: NORMAL

## 2023-03-29 ENCOUNTER — OFFICE VISIT (OUTPATIENT)
Dept: INTERNAL MEDICINE | Facility: CLINIC | Age: 43
End: 2023-03-29
Payer: MEDICAID

## 2023-03-29 VITALS
HEART RATE: 78 BPM | BODY MASS INDEX: 37.64 KG/M2 | SYSTOLIC BLOOD PRESSURE: 112 MMHG | DIASTOLIC BLOOD PRESSURE: 84 MMHG | RESPIRATION RATE: 18 BRPM | TEMPERATURE: 98 F | WEIGHT: 226.2 LBS

## 2023-03-29 DIAGNOSIS — N30.90 CYSTITIS: ICD-10-CM

## 2023-03-29 DIAGNOSIS — F17.200 TOBACCO USE DISORDER: ICD-10-CM

## 2023-03-29 DIAGNOSIS — R73.03 PREDIABETES: ICD-10-CM

## 2023-03-29 DIAGNOSIS — E66.01 CLASS 2 SEVERE OBESITY DUE TO EXCESS CALORIES WITH SERIOUS COMORBIDITY AND BODY MASS INDEX (BMI) OF 38.0 TO 38.9 IN ADULT: ICD-10-CM

## 2023-03-29 DIAGNOSIS — S61.411D LACERATION OF RIGHT HAND WITHOUT FOREIGN BODY, SUBSEQUENT ENCOUNTER: Primary | ICD-10-CM

## 2023-03-29 DIAGNOSIS — R53.82 CHRONIC FATIGUE: ICD-10-CM

## 2023-03-29 LAB
ALBUMIN/CREATININE RATIO, URINE: NORMAL
BILIRUB BLD-MCNC: NEGATIVE MG/DL
CLARITY, POC: CLEAR
COLOR UR: YELLOW
EXPIRATION DATE: ABNORMAL
EXPIRATION DATE: NORMAL
EXPIRATION DATE: NORMAL
GLUCOSE UR STRIP-MCNC: NEGATIVE MG/DL
HBA1C MFR BLD: 5.5 %
KETONES UR QL: NEGATIVE
LEUKOCYTE EST, POC: NEGATIVE
Lab: ABNORMAL
Lab: NORMAL
Lab: NORMAL
NITRITE UR-MCNC: NEGATIVE MG/ML
PH UR: 6 [PH] (ref 5–8)
POC CREATININE URINE: NORMAL
POC MICROALBUMIN URINE: NORMAL
PROT UR STRIP-MCNC: NEGATIVE MG/DL
RBC # UR STRIP: ABNORMAL /UL
SP GR UR: 1.01 (ref 1–1.03)
UROBILINOGEN UR QL: NORMAL

## 2023-03-29 RX ORDER — MUPIROCIN CALCIUM 20 MG/G
1 CREAM TOPICAL 3 TIMES DAILY
Qty: 30 G | Refills: 0 | Status: SHIPPED | OUTPATIENT
Start: 2023-03-29 | End: 2023-04-08

## 2023-03-29 RX ORDER — BACITRACIN ZINC, POLYMYXIN B SULFATE 500; 10000 [USP'U]/G; [USP'U]/G
OINTMENT TOPICAL
COMMUNITY
Start: 2023-03-17

## 2023-03-29 RX ORDER — BUPROPION HYDROCHLORIDE 150 MG/1
150 TABLET ORAL EVERY MORNING
Qty: 30 TABLET | Refills: 1 | Status: SHIPPED | OUTPATIENT
Start: 2023-03-29

## 2023-03-29 NOTE — PATIENT INSTRUCTIONS
Diabetes Mellitus and Nutrition, Adult  When you have diabetes, or diabetes mellitus, it is very important to have healthy eating habits because your blood sugar (glucose) levels are greatly affected by what you eat and drink. Eating healthy foods in the right amounts, at about the same times every day, can help you:  Manage your blood glucose.  Lower your risk of heart disease.  Improve your blood pressure.  Reach or maintain a healthy weight.  What can affect my meal plan?  Every person with diabetes is different, and each person has different needs for a meal plan. Your health care provider may recommend that you work with a dietitian to make a meal plan that is best for you. Your meal plan may vary depending on factors such as:  The calories you need.  The medicines you take.  Your weight.  Your blood glucose, blood pressure, and cholesterol levels.  Your activity level.  Other health conditions you have, such as heart or kidney disease.  How do carbohydrates affect me?  Carbohydrates, also called carbs, affect your blood glucose level more than any other type of food. Eating carbs raises the amount of glucose in your blood.  It is important to know how many carbs you can safely have in each meal. This is different for every person. Your dietitian can help you calculate how many carbs you should have at each meal and for each snack.  How does alcohol affect me?  Alcohol can cause a decrease in blood glucose (hypoglycemia), especially if you use insulin or take certain diabetes medicines by mouth. Hypoglycemia can be a life-threatening condition. Symptoms of hypoglycemia, such as sleepiness, dizziness, and confusion, are similar to symptoms of having too much alcohol.  Do not drink alcohol if:  Your health care provider tells you not to drink.  You are pregnant, may be pregnant, or are planning to become pregnant.  If you drink alcohol:  Limit how much you have to:  0-1 drink a day for women.  0-2 drinks a day  "for men.  Know how much alcohol is in your drink. In the [place] one drink equals one 12 oz bottle of beer (355 mL), one 5 oz glass of wine (148 mL), or one 1½ oz glass of hard liquor (44 mL).  Keep yourself hydrated with water, diet soda, or unsweetened iced tea. Keep in mind that regular soda, juice, and other mixers may contain a lot of sugar and must be counted as carbs.  What are tips for following this plan?  Reading food labels  Start by checking the serving size on the Nutrition Facts label of packaged foods and drinks. The number of calories and the amount of carbs, fats, and other nutrients listed on the label are based on one serving of the item. Many items contain more than one serving per package.  Check the total grams (g) of carbs in one serving.  Check the number of grams of saturated fats and trans fats in one serving. Choose foods that have a low amount or none of these fats.  Check the number of milligrams (mg) of salt (sodium) in one serving. Most people should limit total sodium intake to less than 2,300 mg per day.  Always check the nutrition information of foods labeled as \"low-fat\" or \"nonfat.\" These foods may be higher in added sugar or refined carbs and should be avoided.  Talk to your dietitian to identify your daily goals for nutrients listed on the label.  Shopping  Avoid buying canned, pre-made, or processed foods. These foods tend to be high in fat, sodium, and added sugar.  Shop around the outside edge of the grocery store. This is where you will most often find fresh fruits and vegetables, bulk grains, fresh meats, and fresh dairy products.  Cooking  Use low-heat cooking methods, such as baking, instead of high-heat cooking methods, such as deep frying.  Cook using healthy oils, such as olive, canola, or sunflower oil.  Avoid cooking with butter, cream, or high-fat meats.  Meal planning  Eat meals and snacks regularly, preferably at the same times every day. Avoid going long periods of " time without eating.  Eat foods that are high in fiber, such as fresh fruits, vegetables, beans, and whole grains.  Eat 4-6 oz (112-168 g) of lean protein each day, such as lean meat, chicken, fish, eggs, or tofu. One ounce (oz) (28 g) of lean protein is equal to:  1 oz (28 g) of meat, chicken, or fish.  1 egg.  ¼ cup (62 g) of tofu.  Eat some foods each day that contain healthy fats, such as avocado, nuts, seeds, and fish.  What foods should I eat?  Fruits  Berries. Apples. Oranges. Peaches. Apricots. Plums. Grapes. Mangoes. Papayas. Pomegranates. Kiwi. Cherries.  Vegetables  Leafy greens, including lettuce, spinach, kale, chard, dada greens, mustard greens, and cabbage. Beets. Cauliflower. Broccoli. Carrots. Green beans. Tomatoes. Peppers. Onions. Cucumbers. [place] sprouts.  Grains  Whole grains, such as whole-wheat or whole-grain bread, crackers, tortillas, cereal, and pasta. Unsweetened oatmeal. Quinoa. Brown or wild rice.  Meats and other proteins  Seafood. Poultry without skin. Lean cuts of poultry and beef. Tofu. Nuts. Seeds.  Dairy  Low-fat or fat-free dairy products such as milk, yogurt, and cheese.  The items listed above may not be a complete list of foods and beverages you can eat and drink. Contact a dietitian for more information.  What foods should I avoid?  Fruits  Fruits canned with syrup.  Vegetables  Canned vegetables. Frozen vegetables with butter or cream sauce.  Grains  Refined white flour and flour products such as bread, pasta, snack foods, and cereals. Avoid all processed foods.  Meats and other proteins  Fatty cuts of meat. Poultry with skin. Breaded or fried meats. Processed meat. Avoid saturated fats.  Dairy  Full-fat yogurt, cheese, or milk.  Beverages  Sweetened drinks, such as soda or iced tea.  The items listed above may not be a complete list of foods and beverages you should avoid. Contact a dietitian for more information.  Questions to ask a health care provider  Do I need to  meet with a certified diabetes care and ?  Do I need to meet with a dietitian?  What number can I call if I have questions?  When are the best times to check my blood glucose?  Where to find more information:  American Diabetes Association: diabetes.org  Academy of Nutrition and Dietetics: eatright.org  National [place] of Diabetes and Digestive and Kidney Diseases: niddk.nih.gov  Association of Diabetes Care & Education Specialists: diabeteseducator.org  Summary  It is important to have healthy eating habits because your blood sugar (glucose) levels are greatly affected by what you eat and drink. It is important to use alcohol carefully.  A healthy meal plan will help you manage your blood glucose and lower your risk of heart disease.  Your health care provider may recommend that you work with a dietitian to make a meal plan that is best for you.  This information is not intended to replace advice given to you by your health care provider. Make sure you discuss any questions you have with your health care provider.  Document Revised: 07/21/2021 Document Reviewed: 07/21/2021  Elsevier Patient Education © 2022 Elsevier Inc.

## 2023-03-29 NOTE — ASSESSMENT & PLAN NOTE
Carlos Heller  reports that she has been smoking cigarettes. She has a 10.00 pack-year smoking history. She has never used smokeless tobacco.. I have educated her on the risk of diseases from using tobacco products such as cancer, COPD and heart disease.     I advised her to quit and she is willing to quit. We have discussed the following method/s for tobacco cessation:  Counseling Prescription Medicaiton.  Together we have set a quit date for 1 month from today.  She will follow up with me in 1 month or sooner to check on her progress.    I spent 5 minutes counseling the patient.

## 2023-03-29 NOTE — PROGRESS NOTES
"    Follow Up Office Visit      Date: 2023   Patient Name: Carlos Heller  : 1980   MRN: [id number]     Chief Complaint:    Chief Complaint   Patient presents with   • Urinary Tract Infection     Tried OTC treatment   • Weight Check   • Suture / Staple Removal       History of Present Illness: Carlos Heller is a 42 y.o. female who is here today who presents to the clinic today  for suture removal, weight, and urinary tract infection.      Sutures  The patient states she feels her sutures in her hand are tearing. She notes they are hurting. She reports she has the sutures in for approximately 1.5 weeks. She states the white skin is where her hand keeps getting wet and she was informed not to get it wet. She notes she has been wearing gloves. She reports that she feels one side is not healing and has been experiencing pain and there is discoloration. The patient reports she cut her hand at work on the .     Urinary tract infection  The patient states she took an over-the-counter test from [facility] 2 months ago and it came back positive. She notes she attempted to treat it herself with AZO and the cranberry tablets. She denies any pain with urination; however, she experiences discomfort. She denies having menses. She states she is unsure if it is a urinary tract infection or issues with her kidneys. She notes she was experiencing severe pain to the point she had to leave work. She reports the pain as intermittent.     The patient states she has 1 ovary. She reports she is scheduled to go for a transvaginal ultrasound in a couple of weeks to see if her ovaries are \"messing\" up.    Weight loss  The patient reports she started taking Ozempic. She reports she began experiencing headaches and discontinued the medication. She notes she has since started back taking the medication approximately 1 week ago and the headaches have returned. She communicates she has an opportunity to have a " revisional for weight loss; however, she is not desiring to go that route. She notes she has had one previously. She denies feeling tired on Ozempic    Anxiety  The patient states she has taken bupropion previously. She notes she was unable to tolerate. She reports she had heart palpitations. She inquires about restarting bupropion at the lowest dose. She notes when she got bumped up to the higher does is when she started experiencing the heart palpitations.    Weight loss  The patient states she does not feel like she has lost 12 pounds since her last visit. She notes her weight has been going up and down. She reports she has been going to them gym.     Diabetes  The patient reports she needs to get her A1c back down to 4 percent. She notes she used to have to take insulin. The patient communicates she eats at night. She notes she will awaken from her sleep and go get food. She reports this issue just started this year and she is unsure if she is dreaming about food. The patient inquires if she can restart taking medication she use to take for her back.    Tobacco use   The patient communicates she has nicotine patches. She denies wanting to take Chantix secondary to experiencing nightmares.       Subjective      Review of Systems:   Review of Systems   Constitutional: Negative for activity change, appetite change, fatigue and fever.   Eyes: Negative for blurred vision, photophobia and visual disturbance.   Respiratory: Negative for cough, chest tightness and shortness of breath.    Cardiovascular: Negative for chest pain and palpitations.   Gastrointestinal: Negative for abdominal distention, abdominal pain, blood in stool, constipation, diarrhea, nausea and vomiting.   Genitourinary: Positive for pelvic pain. Negative for dysuria and hematuria.   Musculoskeletal: Negative for arthralgias, back pain and joint swelling.   Skin: Positive for wound. Negative for rash.   Neurological: Negative for weakness, headache  and confusion.       I have reviewed the patients family history, social history, past medical history, past surgical history and have updated it as appropriate.     Medications:     Current Outpatient Medications:   •  Bacitracin-Polymyxin B ([facility] Poly Bacitracin) [id number] UNIT/GM ointment, APPLY 1 G TOPICALLY 2 (TWO) TIMES DAILY FOR 7 DAYS., Disp: , Rfl:   •  Cariprazine HCl (Vraylar) 1.5 MG capsule capsule, Take 1 capsule by mouth Every Other Day for 14 days, THEN 1 capsule Daily., Disp: 30 capsule, Rfl: 1  •  Semaglutide,0.25 or 0.5MG/DOS, (OZEMPIC) 2 MG/1.5ML solution pen-injector, Inject 0.25 mg under the skin into the appropriate area as directed 1 (One) Time Per Week., Disp: 1.5 mL, Rfl: 1  •  traZODone (DESYREL) 50 MG tablet, Take 1 tablet by mouth At Night As Needed for Sleep., Disp: 30 tablet, Rfl: 1  •  buPROPion XL (WELLBUTRIN XL) 150 MG 24 hr tablet, Take 1 tablet by mouth Every Morning., Disp: 30 tablet, Rfl: 1  •  mupirocin (BACTROBAN) 2 % cream, Apply 1 application topically to the appropriate area as directed 3 (Three) Times a Day for 10 days., Disp: 30 g, Rfl: 0    Allergies:   Allergies   Allergen Reactions   • Influenza Vaccines GI Intolerance     Other reaction(s): GI Intolerance   • Sulfamethoxazole-Trimethoprim Unknown - Low Severity   • Wellbutrin [Bupropion] Palpitations       Objective     Physical Exam: Please see above  Vital Signs:   Vitals:    03/29/23 0940   BP: 112/84   BP [facility]: Right arm   Patient Position: Sitting   Cuff Size: Adult   Pulse: 78   Resp: 18   Temp: 98 °F (36.7 °C)   TempSrc: Temporal   Weight: 103 kg (226 lb 3.2 oz)   PainSc: 0-No pain     Body mass index is 37.64 kg/m².       Physical Exam  Vitals and nursing note reviewed.   Constitutional:       General: She is not in acute distress.     Appearance: Normal appearance. She is normal weight. She is not ill-appearing or toxic-appearing.   HENT:      Nose: No congestion or rhinorrhea.   Eyes:      General:          Right eye: No discharge.         Left eye: No discharge.      Conjunctiva/sclera: Conjunctivae normal.   Pulmonary:      Effort: Pulmonary effort is normal. No respiratory distress.   Abdominal:      General: Abdomen is flat. There is no distension.   Musculoskeletal:      Cervical back: Normal range of motion.   Skin:     Coloration: Skin is not jaundiced.      Findings: No rash.      Comments: Right index finger laceration with sutures intact and no dehis present   Neurological:      General: No focal deficit present.      Mental Status: She is alert. Mental status is at baseline.      Coordination: Coordination normal.      Gait: Gait normal.   Psychiatric:         Mood and Affect: Mood normal.         Behavior: Behavior normal.         Thought Content: Thought content normal.         Judgment: Judgment normal.         Procedures    Results:   Labs:   Hemoglobin A1C   Date Value Ref Range Status   03/29/2023 5.5 % Final   07/17/2018 8.10 (H) 4.80 - 5.60 % Final     TSH   Date Value Ref Range Status   07/29/2022 0.999 0.450 - 4.500 uIU/mL Final        Imaging:   No valid procedures specified.     Assessment / Plan      Assessment/Plan:   Problem List Items Addressed This Visit        Endocrine and Metabolic    Prediabetes    Relevant Medications    Semaglutide,0.25 or 0.5MG/DOS, (OZEMPIC) 2 MG/1.5ML solution pen-injector    Other Relevant Orders    POC Microalbumin (Completed)    POC Glycosylated Hemoglobin (Hb A1C) (Completed)    Class 2 obesity due to excess calories with body mass index (BMI) of 38.0 to 38.9 in adult    Relevant Medications    Semaglutide,0.25 or 0.5MG/DOS, (OZEMPIC) 2 MG/1.5ML solution pen-injector    buPROPion XL (WELLBUTRIN XL) 150 MG 24 hr tablet       Genitourinary and Reproductive     Cystitis    Relevant Orders    Urine Culture - , Urine, Clean Catch    POC Urinalysis Dipstick, Automated (Completed)       Symptoms and Signs    Chronic fatigue    Relevant Orders    TSH    Comprehensive  Metabolic Panel    C-reactive Protein    CK    CBC Auto Differential    HIV-1 / O / 2 Ag / Antibody 4th Generation    Vitamin B12       Tobacco    Tobacco use disorder    Current Assessment & Plan     Carlos Heller  reports that she has been smoking cigarettes. She has a 10.00 pack-year smoking history. She has never used smokeless tobacco.. I have educated her on the risk of diseases from using tobacco products such as cancer, COPD and heart disease.     I advised her to quit and she is willing to quit. We have discussed the following method/s for tobacco cessation:  Counseling Prescription Medicaiton.  Together we have set a quit date for 1 month from today.  She will follow up with me in 1 month or sooner to check on her progress.    I spent 5 minutes counseling the patient.                Relevant Medications    buPROPion XL (WELLBUTRIN XL) 150 MG 24 hr tablet   Other Visit Diagnoses     Laceration of right hand without foreign body, subsequent encounter    -  Primary    Relevant Medications    mupirocin (BACTROBAN) 2 % cream        1. Sutures  - Will remove sutures today.  - Advise to wash with soap and water everyday; multiple times a day and keep the Steri-Strips on.    - Will prescribe mupirocin cream.  - Advise to call if notice more discoloration and/or worsening of pain.   - Advise to avoid hydrogen peroxide and alcohol.    2. Urinary tract infection  - Will order urinalysis; rapid and culture.    3. Weight loss  - Explained the patient has had some reasonable weight loss since her last visit.  - Offered encouragement.  - Will recheck for diabetes. She is prediabetic.  - Discussed going slower with increasing Ozempic.  - Discussed adding additional medication; bupropion.   - Will print out diabetic diet.  - Discussed the side effects of Ozempic.    4. Tobacco cessation  - Discussed adding bupropion to help with tobacco cessation; will avoid due to heart palpitations.   - Patient has nicotine  patches.    - Discussed Chantix; patient reported nightmares.   - Discussed Contraves.        Follow Up:   Return in about 4 weeks (around 4/26/2023) for Recheck.          Virgil Barkley MD  [facility]    Transcribed from ambient dictation for Virgil Barkley MD by Margarita Mcintosh.  03/29/23   14:09 EDT    Patient or patient representative verbalized consent to the visit recording.  I have personally performed the services described in this document as transcribed by the above individual, and it is both accurate and complete.    Transcribed from ambient dictation for Virgil Barkley MD by Amy Meehan.  03/29/23   14:09 EDT    Patient or patient representative verbalized consent to the visit recording.  I have personally performed the services described in this document as transcribed by the above individual, and it is both accurate and complete.

## 2023-03-30 ENCOUNTER — TELEPHONE (OUTPATIENT)
Dept: INTERNAL MEDICINE | Facility: CLINIC | Age: 43
End: 2023-03-30
Payer: MEDICAID

## 2023-03-30 DIAGNOSIS — E53.8 B12 DEFICIENCY: Primary | ICD-10-CM

## 2023-03-30 LAB
ALBUMIN SERPL-MCNC: 4.3 G/DL (ref 3.8–4.8)
ALBUMIN/GLOB SERPL: 1.7 {RATIO} (ref 1.2–2.2)
ALP SERPL-CCNC: 43 IU/L (ref 44–121)
ALT SERPL-CCNC: 8 IU/L (ref 0–32)
AST SERPL-CCNC: 10 IU/L (ref 0–40)
BASOPHILS # BLD AUTO: 0 X10E3/UL (ref 0–0.2)
BASOPHILS NFR BLD AUTO: 0 %
BILIRUB SERPL-MCNC: 1.2 MG/DL (ref 0–1.2)
BUN SERPL-MCNC: 5 MG/DL (ref 6–24)
BUN/CREAT SERPL: 7 (ref 9–23)
CALCIUM SERPL-MCNC: 9.5 MG/DL (ref 8.7–10.2)
CHLORIDE SERPL-SCNC: 108 MMOL/L (ref 96–106)
CK SERPL-CCNC: 90 U/L (ref 32–182)
CO2 SERPL-SCNC: 20 MMOL/L (ref 20–29)
CREAT SERPL-MCNC: 0.74 MG/DL (ref 0.57–1)
CRP SERPL-MCNC: <1 MG/L (ref 0–10)
EGFRCR SERPLBLD CKD-EPI 2021: 105 ML/MIN/1.73
EOSINOPHIL # BLD AUTO: 0.3 X10E3/UL (ref 0–0.4)
EOSINOPHIL NFR BLD AUTO: 4 %
ERYTHROCYTE [DISTWIDTH] IN BLOOD BY AUTOMATED COUNT: 13.2 % (ref 11.7–15.4)
GLOBULIN SER CALC-MCNC: 2.5 G/DL (ref 1.5–4.5)
GLUCOSE SERPL-MCNC: 76 MG/DL (ref 70–99)
HCT VFR BLD AUTO: 43.5 % (ref 34–46.6)
HGB BLD-MCNC: 14.9 G/DL (ref 11.1–15.9)
HIV 1+2 AB+HIV1 P24 AG SERPL QL IA: NON REACTIVE
IMM GRANULOCYTES # BLD AUTO: 0 X10E3/UL (ref 0–0.1)
IMM GRANULOCYTES NFR BLD AUTO: 0 %
LYMPHOCYTES # BLD AUTO: 3.1 X10E3/UL (ref 0.7–3.1)
LYMPHOCYTES NFR BLD AUTO: 42 %
MCH RBC QN AUTO: 28.5 PG (ref 26.6–33)
MCHC RBC AUTO-ENTMCNC: 34.3 G/DL (ref 31.5–35.7)
MCV RBC AUTO: 83 FL (ref 79–97)
MONOCYTES # BLD AUTO: 0.5 X10E3/UL (ref 0.1–0.9)
MONOCYTES NFR BLD AUTO: 7 %
NEUTROPHILS # BLD AUTO: 3.5 X10E3/UL (ref 1.4–7)
NEUTROPHILS NFR BLD AUTO: 47 %
PLATELET # BLD AUTO: 264 X10E3/UL (ref 150–450)
POTASSIUM SERPL-SCNC: 4 MMOL/L (ref 3.5–5.2)
PROT SERPL-MCNC: 6.8 G/DL (ref 6–8.5)
RBC # BLD AUTO: 5.22 X10E6/UL (ref 3.77–5.28)
SODIUM SERPL-SCNC: 143 MMOL/L (ref 134–144)
TSH SERPL DL<=0.005 MIU/L-ACNC: 1.19 UIU/ML (ref 0.45–4.5)
VIT B12 SERPL-MCNC: 152 PG/ML (ref 232–1245)
WBC # BLD AUTO: 7.4 X10E3/UL (ref 3.4–10.8)

## 2023-03-30 RX ORDER — LANOLIN ALCOHOL/MO/W.PET/CERES
1000 CREAM (GRAM) TOPICAL DAILY
Qty: 60 TABLET | Refills: 0 | Status: SHIPPED | OUTPATIENT
Start: 2023-03-30

## 2023-04-01 LAB
BACTERIA UR CULT: NORMAL
BACTERIA UR CULT: NORMAL

## 2023-04-04 ENCOUNTER — TELEPHONE (OUTPATIENT)
Dept: INTERNAL MEDICINE | Facility: CLINIC | Age: 43
End: 2023-04-04
Payer: MEDICAID

## 2023-04-04 DIAGNOSIS — R82.90 ABNORMAL URINALYSIS: Primary | ICD-10-CM

## 2023-04-04 NOTE — TELEPHONE ENCOUNTER
Spoke with patient. Patient informed me that she is having Urination frequency , itchy x1 month . She was seen on the 29th for these symptoms. Please advise.

## 2023-04-04 NOTE — TELEPHONE ENCOUNTER
Caller: Carlos Heller    Relationship: Self    Best call back number: [phone]    What medication are you requesting: ANTIBIOTIC     What are your current symptoms: UTI    PATIENT STATES SHE THINKS SHE HAS A UTI   PHARMACY: [facility]/pharmacy #[id number] - [address] - [phone]  - [phone]   [phone]

## 2023-04-04 NOTE — TELEPHONE ENCOUNTER
"  Caller: ArronCarlos    Relationship: Self    Best call back number: [phone]    What medications are you currently taking:   Current Outpatient Medications on File Prior to Visit   Medication Sig Dispense Refill   • Bacitracin-Polymyxin B ([facility] Poly Bacitracin) [id number] UNIT/GM ointment APPLY 1 G TOPICALLY 2 (TWO) TIMES DAILY FOR 7 DAYS.     • buPROPion XL (WELLBUTRIN XL) 150 MG 24 hr tablet Take 1 tablet by mouth Every Morning. 30 tablet 1   • Cariprazine HCl (Vraylar) 1.5 MG capsule capsule Take 1 capsule by mouth Every Other Day for 14 days, THEN 1 capsule Daily. 30 capsule 1   • mupirocin (BACTROBAN) 2 % cream Apply 1 application topically to the appropriate area as directed 3 (Three) Times a Day for 10 days. 30 g 0   • Semaglutide,0.25 or 0.5MG/DOS, (OZEMPIC) 2 MG/1.5ML solution pen-injector Inject 0.25 mg under the skin into the appropriate area as directed 1 (One) Time Per Week. 1.5 mL 1   • traZODone (DESYREL) 50 MG tablet Take 1 tablet by mouth At Night As Needed for Sleep. 30 tablet 1   • vitamin B-12 (CYANOCOBALAMIN) 1000 MCG tablet Take 1 tablet by mouth Daily. 60 tablet 0     No current facility-administered medications on file prior to visit.          Which medication are you concerned about: WELLBUTRIN    Who prescribed you this medication: DR REYNOLDS    What are your concerns: PATIENT STATED THE WELLBUTRIN IS MAKING HER FEEL\"PANICKY\" AND WANTS TO KNOW IF SHE CAN START ADIPEX    How long have you had these concerns:     "

## 2023-04-04 NOTE — TELEPHONE ENCOUNTER
Spoke with patient and read Esther's message . Patient is going to stop by some time this week to leave a urine sample so she is able to get an antibiotic.

## 2023-04-04 NOTE — TELEPHONE ENCOUNTER
Please let the patient know that the urine culture was not able to identify the organism and they suggested we resubmit another urine culture.  I placed the order if she can have this completed and let me know I will send an antibiotic in for her.  Please see if she is having any vaginal symptoms such as discharge or itching.

## 2023-04-06 NOTE — TELEPHONE ENCOUNTER
I do not feel that Adipex would be a good choice as it is a stimulant and could also cause panic.  I would recommend an appointment with Dr. Barkley to discuss further.

## 2023-04-07 NOTE — TELEPHONE ENCOUNTER
Left voice mail message for Pt to call back. Office number given.    Hub please relay message from NORA Navarro   I do not feel that Adipex would be a good choice as it is a stimulant and could also cause panic.  I would recommend an appointment with Dr. Barkley to discuss further.

## 2023-04-10 NOTE — TELEPHONE ENCOUNTER
Thanks for letting me know.  We can discuss further during the patient's next appointment tomorrow.

## 2023-04-10 NOTE — TELEPHONE ENCOUNTER
Pt reached and advised of clinical message from NORA Navarro. Pt stated she did use to take Adipex before and never had panic. Please advise if something else can be prescribed, or if Pt will need appointment.

## 2023-04-12 ENCOUNTER — OFFICE VISIT (OUTPATIENT)
Dept: INTERNAL MEDICINE | Facility: CLINIC | Age: 43
End: 2023-04-12
Payer: MEDICAID

## 2023-04-12 VITALS
SYSTOLIC BLOOD PRESSURE: 128 MMHG | WEIGHT: 227 LBS | DIASTOLIC BLOOD PRESSURE: 78 MMHG | RESPIRATION RATE: 16 BRPM | BODY MASS INDEX: 37.77 KG/M2 | HEART RATE: 79 BPM | TEMPERATURE: 98 F

## 2023-04-12 DIAGNOSIS — F17.200 TOBACCO USE DISORDER: ICD-10-CM

## 2023-04-12 DIAGNOSIS — K21.00 GASTROESOPHAGEAL REFLUX DISEASE WITH ESOPHAGITIS WITHOUT HEMORRHAGE: ICD-10-CM

## 2023-04-12 DIAGNOSIS — E66.01 CLASS 2 SEVERE OBESITY DUE TO EXCESS CALORIES WITH SERIOUS COMORBIDITY AND BODY MASS INDEX (BMI) OF 38.0 TO 38.9 IN ADULT: ICD-10-CM

## 2023-04-12 DIAGNOSIS — L02.91 ABSCESS: ICD-10-CM

## 2023-04-12 DIAGNOSIS — S61.411D LACERATION OF RIGHT HAND WITHOUT FOREIGN BODY, SUBSEQUENT ENCOUNTER: Primary | ICD-10-CM

## 2023-04-12 DIAGNOSIS — R73.03 PREDIABETES: ICD-10-CM

## 2023-04-12 DIAGNOSIS — R31.1 BENIGN ESSENTIAL MICROSCOPIC HEMATURIA: ICD-10-CM

## 2023-04-12 PROBLEM — S61.411A LACERATION OF RIGHT HAND WITHOUT FOREIGN BODY: Status: ACTIVE | Noted: 2023-04-12

## 2023-04-12 LAB
BILIRUB BLD-MCNC: NEGATIVE MG/DL
CLARITY, POC: CLEAR
COLOR UR: YELLOW
EXPIRATION DATE: ABNORMAL
GLUCOSE UR STRIP-MCNC: NEGATIVE MG/DL
KETONES UR QL: ABNORMAL
LEUKOCYTE EST, POC: ABNORMAL
Lab: ABNORMAL
NITRITE UR-MCNC: NEGATIVE MG/ML
PH UR: 5 [PH] (ref 5–8)
PROT UR STRIP-MCNC: NEGATIVE MG/DL
RBC # UR STRIP: ABNORMAL /UL
SP GR UR: 1.02 (ref 1–1.03)
UROBILINOGEN UR QL: NORMAL

## 2023-04-12 RX ORDER — SEMAGLUTIDE 0.68 MG/ML
INJECTION, SOLUTION SUBCUTANEOUS
COMMUNITY
Start: 2023-03-29 | End: 2023-04-12

## 2023-04-12 RX ORDER — OMEPRAZOLE 40 MG/1
40 CAPSULE, DELAYED RELEASE ORAL DAILY
Qty: 60 CAPSULE | Refills: 3 | Status: SHIPPED | OUTPATIENT
Start: 2023-04-12

## 2023-04-12 NOTE — PROGRESS NOTES
"    Follow Up Office Visit      Date: 2023   Patient Name: Carlos Heller  : 1980   MRN: [id number]     Chief Complaint:    Chief Complaint   Patient presents with   • Obesity     Change medicine.       History of Present Illness: Carlos Heller is a 42 y.o. female who is here today to follow up with chronic care management.    Hematuria  The pateint believes she has an infection and had a urinary culture. She inquires about hematuria. She has associated pelvic pain which has been ongoing for a while, and white discharge when she wipes. She denies any itching or burning.    Abscess  The patient reports draining 3 \"boils\" in the last 3 weeks localized to her buttock and lower extremity. Previously she got them when she was overweight.    Prediabetes  She was prediabetic; However, her A1c has improved and she is now out of the prediabetic range. She received Ozempic but has not started it. She inquires about phentermine and has taken it in the past.     GERD  The patient took \"the purple pill\" in the past for GERD and it stopped working. She tried Pepcid over-the-counter which does not give any relief. Her symptoms are exacerbated by caffeinated beverages and becoming worse. She starts to vomits if he eats or drinks now. She started taking TUMS and used 2 bottles within 2 days which is making her \"go to the bathroom.\"    Finger  The patient reports her finger is healing well and she applies cream every day, although she has some soreness.    Smoking cessation  She self discontinued Wellbutrin after 1 week due to mild palpitations. She states her  said she was having panic episodes before taking the Wellbutrin. She has not been using patches but states she plans to. She states the patches helped her to stop smoking the first time.     Iron   The patient inquires if she should continue iron pills.       Subjective      Review of Systems:   Review of Systems   Constitutional: " Negative for activity change, appetite change, fatigue and fever.   Eyes: Negative for blurred vision, photophobia and visual disturbance.   Respiratory: Negative for cough, chest tightness and shortness of breath.    Cardiovascular: Negative for chest pain and palpitations.   Gastrointestinal: Positive for GERD. Negative for abdominal distention, abdominal pain, blood in stool, constipation, diarrhea, nausea and vomiting.   Genitourinary: Negative for dysuria and hematuria.   Musculoskeletal: Negative for arthralgias, back pain and joint swelling.   Skin: Positive for skin lesions. Negative for rash and wound.   Neurological: Negative for weakness, headache and confusion.       I have reviewed the patients family history, social history, past medical history, past surgical history and have updated it as appropriate.     Medications:     Current Outpatient Medications:   •  buPROPion XL (WELLBUTRIN XL) 150 MG 24 hr tablet, Take 1 tablet by mouth Every Morning., Disp: 30 tablet, Rfl: 1  •  Cariprazine HCl (Vraylar) 1.5 MG capsule capsule, Take 1 capsule by mouth Every Other Day for 14 days, THEN 1 capsule Daily., Disp: 30 capsule, Rfl: 1  •  Semaglutide,0.25 or 0.5MG/DOS, (OZEMPIC) 2 MG/1.5ML solution pen-injector, Inject 0.5 mg under the skin into the appropriate area as directed 1 (One) Time Per Week., Disp: 1.5 mL, Rfl: 1  •  traZODone (DESYREL) 50 MG tablet, Take 1 tablet by mouth At Night As Needed for Sleep., Disp: 30 tablet, Rfl: 1  •  vitamin B-12 (CYANOCOBALAMIN) 1000 MCG tablet, Take 1 tablet by mouth Daily., Disp: 60 tablet, Rfl: 0  •  mupirocin (BACTROBAN) 2 % ointment, Apply 1 application topically to the appropriate area as directed 3 (Three) Times a Day., Disp: 15 g, Rfl: 3  •  omeprazole (priLOSEC) 40 MG capsule, Take 1 capsule by mouth Daily., Disp: 60 capsule, Rfl: 3    Allergies:   Allergies   Allergen Reactions   • Influenza Vaccines GI Intolerance     Other reaction(s): GI Intolerance   •  Sulfamethoxazole-Trimethoprim Unknown - Low Severity   • Wellbutrin [Bupropion] Palpitations       Objective     Physical Exam: Please see above  Vital Signs:   Vitals:    04/12/23 1338   BP: 128/78   BP [facility]: Right arm   Patient Position: Sitting   Cuff Size: Adult   Pulse: 79   Resp: 16   Temp: 98 °F (36.7 °C)   TempSrc: Temporal   Weight: 103 kg (227 lb)   PainSc: 0-No pain     Body mass index is 37.77 kg/m².       Physical Exam  Vitals and nursing note reviewed.   Constitutional:       General: She is not in acute distress.     Appearance: Normal appearance. She is normal weight. She is not ill-appearing or toxic-appearing.   HENT:      Nose: No congestion or rhinorrhea.   Eyes:      General:         Right eye: No discharge.         Left eye: No discharge.      Conjunctiva/sclera: Conjunctivae normal.   Pulmonary:      Effort: Pulmonary effort is normal. No respiratory distress.   Abdominal:      General: Abdomen is flat. There is no distension.   Musculoskeletal:      Cervical back: Normal range of motion.   Skin:     Coloration: Skin is not jaundiced.      Findings: No rash.   Neurological:      General: No focal deficit present.      Mental Status: She is alert. Mental status is at baseline.      Coordination: Coordination normal.      Gait: Gait normal.   Psychiatric:         Mood and Affect: Mood normal.         Behavior: Behavior normal.         Thought Content: Thought content normal.         Judgment: Judgment normal.         Procedures    Results:   Labs:   Hemoglobin A1C   Date Value Ref Range Status   03/29/2023 5.5 % Final   07/17/2018 8.10 (H) 4.80 - 5.60 % Final     TSH   Date Value Ref Range Status   03/29/2023 1.190 0.450 - 4.500 uIU/mL Final        Imaging:   No valid procedures specified.     Assessment / Plan      Assessment/Plan:   Problem List Items Addressed This Visit        Endocrine and Metabolic    Prediabetes    Relevant Medications    Semaglutide,0.25 or 0.5MG/DOS, (OZEMPIC) 2  MG/1.5ML solution pen-injector    Class 2 obesity due to excess calories with body mass index (BMI) of 38.0 to 38.9 in adult    Relevant Medications    Semaglutide,0.25 or 0.5MG/DOS, (OZEMPIC) 2 MG/1.5ML solution pen-injector       Genitourinary and Reproductive     Benign essential microscopic hematuria    Relevant Orders    POC Urinalysis Dipstick, Automated (Completed)    Urine Culture - , Urine, Clean Catch       Musculoskeletal and Injuries    Laceration of right hand without foreign body - Primary       Tobacco    Tobacco use disorder    Current Assessment & Plan     Carlos Heller  reports that she has been smoking cigarettes. She has a 10.00 pack-year smoking history. She has never used smokeless tobacco.. I have educated her on the risk of diseases from using tobacco products such as cancer, COPD and heart disease.     I advised her to quit and she is willing to quit. We have discussed the following method/s for tobacco cessation:  Counseling Prescription Medicaiton.  Together we have set a quit date for 1 month from today.  She will follow up with me in 1 month or sooner to check on her progress.    I spent 5 minutes counseling the patient.                   Other    Abscess    Current Assessment & Plan     She will apply mupirocin cream to the affected area.   If the abscess persists she will notify me.   Recommend Desitin along creases of groin and buttocks.         Relevant Medications    mupirocin (BACTROBAN) 2 % ointment    Gastroesophageal reflux disease with esophagitis without hemorrhage    Relevant Medications    omeprazole (priLOSEC) 40 MG capsule         Follow Up:   Return in about 3 months (around 7/12/2023) for Recheck.          Virgil Barkley MD  [facility]    Transcribed from ambient dictation for Virgil Barkley MD by Chana Gonzalez.  04/12/23   14:35 EDT    Patient or patient representative verbalized consent to the visit recording.  I have personally performed the  services described in this document as transcribed by the above individual, and it is both accurate and complete.

## 2023-04-12 NOTE — ASSESSMENT & PLAN NOTE
She will apply mupirocin cream to the affected area.   If the abscess persists she will notify me.   Recommend Desitin along creases of groin and buttocks.

## 2023-04-16 LAB
BACTERIA UR CULT: ABNORMAL
OTHER ANTIBIOTIC SUSC ISLT: ABNORMAL

## 2023-04-17 ENCOUNTER — TELEPHONE (OUTPATIENT)
Dept: INTERNAL MEDICINE | Facility: CLINIC | Age: 43
End: 2023-04-17
Payer: MEDICAID

## 2023-04-17 DIAGNOSIS — N30.90 CYSTITIS: Primary | ICD-10-CM

## 2023-04-17 RX ORDER — NITROFURANTOIN 25; 75 MG/1; MG/1
100 CAPSULE ORAL 2 TIMES DAILY
Qty: 10 CAPSULE | Refills: 0 | Status: SHIPPED | OUTPATIENT
Start: 2023-04-17 | End: 2023-04-22

## 2023-05-12 DIAGNOSIS — J01.90 ACUTE SINUSITIS, RECURRENCE NOT SPECIFIED, UNSPECIFIED LOCATION: Primary | ICD-10-CM

## 2023-05-12 RX ORDER — FLUCONAZOLE 150 MG/1
TABLET ORAL
Qty: 2 TABLET | Refills: 0 | Status: SHIPPED | OUTPATIENT
Start: 2023-05-12

## 2023-05-12 RX ORDER — AMOXICILLIN AND CLAVULANATE POTASSIUM 875; 125 MG/1; MG/1
1 TABLET, FILM COATED ORAL 2 TIMES DAILY
Qty: 20 TABLET | Refills: 0 | Status: SHIPPED | OUTPATIENT
Start: 2023-05-12

## 2023-08-31 DIAGNOSIS — E66.01 CLASS 2 SEVERE OBESITY DUE TO EXCESS CALORIES WITH SERIOUS COMORBIDITY AND BODY MASS INDEX (BMI) OF 38.0 TO 38.9 IN ADULT: ICD-10-CM

## 2023-08-31 DIAGNOSIS — R73.03 PREDIABETES: ICD-10-CM

## 2023-09-01 ENCOUNTER — TELEPHONE (OUTPATIENT)
Dept: INTERNAL MEDICINE | Facility: CLINIC | Age: 43
End: 2023-09-01
Payer: MEDICAID

## 2023-09-01 RX ORDER — SEMAGLUTIDE 0.68 MG/ML
INJECTION, SOLUTION SUBCUTANEOUS
Qty: 3 ML | Refills: 0 | Status: SHIPPED | OUTPATIENT
Start: 2023-09-01

## 2023-09-14 ENCOUNTER — TELEPHONE (OUTPATIENT)
Dept: INTERNAL MEDICINE | Facility: CLINIC | Age: 43
End: 2023-09-14
Payer: MEDICAID

## 2024-01-25 ENCOUNTER — OFFICE VISIT (OUTPATIENT)
Dept: INTERNAL MEDICINE | Facility: CLINIC | Age: 44
End: 2024-01-25
Payer: MEDICAID

## 2024-01-25 VITALS
HEART RATE: 74 BPM | WEIGHT: 229.4 LBS | BODY MASS INDEX: 35.93 KG/M2 | TEMPERATURE: 97.3 F | SYSTOLIC BLOOD PRESSURE: 122 MMHG | DIASTOLIC BLOOD PRESSURE: 86 MMHG | RESPIRATION RATE: 16 BRPM

## 2024-01-25 DIAGNOSIS — K21.9 GASTROESOPHAGEAL REFLUX DISEASE WITHOUT ESOPHAGITIS: ICD-10-CM

## 2024-01-25 DIAGNOSIS — N30.90 CYSTITIS: ICD-10-CM

## 2024-01-25 DIAGNOSIS — E78.2 MIXED HYPERLIPIDEMIA: ICD-10-CM

## 2024-01-25 DIAGNOSIS — G47.00 INSOMNIA, UNSPECIFIED TYPE: ICD-10-CM

## 2024-01-25 DIAGNOSIS — R73.03 PREDIABETES: Primary | ICD-10-CM

## 2024-01-25 DIAGNOSIS — R31.1 BENIGN ESSENTIAL MICROSCOPIC HEMATURIA: ICD-10-CM

## 2024-01-25 DIAGNOSIS — G58.9 COMPRESSION NEUROPATHY: ICD-10-CM

## 2024-01-25 DIAGNOSIS — E55.9 VITAMIN D DEFICIENCY: ICD-10-CM

## 2024-01-25 DIAGNOSIS — F17.200 TOBACCO USE DISORDER: ICD-10-CM

## 2024-01-25 DIAGNOSIS — L73.2 HIDRADENITIS SUPPURATIVA: ICD-10-CM

## 2024-01-25 LAB
ALBUMIN/CREATININE RATIO, URINE: <30
EXPIRATION DATE: NORMAL
EXPIRATION DATE: NORMAL
HBA1C MFR BLD: 5.3 % (ref 4.5–5.7)
Lab: NORMAL
Lab: NORMAL
POC CREATININE URINE: 50
POC MICROALBUMIN URINE: 10

## 2024-01-25 RX ORDER — DOXYCYCLINE HYCLATE 100 MG/1
100 CAPSULE ORAL DAILY
Qty: 90 CAPSULE | Refills: 0 | Status: SHIPPED | OUTPATIENT
Start: 2024-01-25

## 2024-01-25 RX ORDER — TRAZODONE HYDROCHLORIDE 100 MG/1
100 TABLET ORAL NIGHTLY
Qty: 60 TABLET | Refills: 1 | Status: SHIPPED | OUTPATIENT
Start: 2024-01-25

## 2024-01-25 RX ORDER — FAMOTIDINE 20 MG/1
20 TABLET, FILM COATED ORAL 2 TIMES DAILY
Qty: 60 TABLET | Refills: 1 | Status: SHIPPED | OUTPATIENT
Start: 2024-01-25

## 2024-01-25 NOTE — PROGRESS NOTES
New insurance starts in February and wants another medication other than ozempic because it hurts her stomach    Hands go numb when laying down and legs goes numb on the toilet    Had another UTI treated by provider at work but didn't finish antibiotic and symptoms are coming back     Sugar has been running 180-200    Wants something else for weight loss    Abscesses coming back one on arm, under eye, in between legs

## 2024-01-25 NOTE — PATIENT INSTRUCTIONS
Diabetes Mellitus and Nutrition, Adult  When you have diabetes, or diabetes mellitus, it is very important to have healthy eating habits because your blood sugar (glucose) levels are greatly affected by what you eat and drink. Eating healthy foods in the right amounts, at about the same times every day, can help you:  Manage your blood glucose.  Lower your risk of heart disease.  Improve your blood pressure.  Reach or maintain a healthy weight.  What can affect my meal plan?  Every person with diabetes is different, and each person has different needs for a meal plan. Your health care provider may recommend that you work with a dietitian to make a meal plan that is best for you. Your meal plan may vary depending on factors such as:  The calories you need.  The medicines you take.  Your weight.  Your blood glucose, blood pressure, and cholesterol levels.  Your activity level.  Other health conditions you have, such as heart or kidney disease.  How do carbohydrates affect me?  Carbohydrates, also called carbs, affect your blood glucose level more than any other type of food. Eating carbs raises the amount of glucose in your blood.  It is important to know how many carbs you can safely have in each meal. This is different for every person. Your dietitian can help you calculate how many carbs you should have at each meal and for each snack.  How does alcohol affect me?  Alcohol can cause a decrease in blood glucose (hypoglycemia), especially if you use insulin or take certain diabetes medicines by mouth. Hypoglycemia can be a life-threatening condition. Symptoms of hypoglycemia, such as sleepiness, dizziness, and confusion, are similar to symptoms of having too much alcohol.  Do not drink alcohol if:  Your health care provider tells you not to drink.  You are pregnant, may be pregnant, or are planning to become pregnant.  If you drink alcohol:  Limit how much you have to:  0-1 drink a day for women.  0-2 drinks a day  "for men.  Know how much alcohol is in your drink. In the [place] one drink equals one 12 oz bottle of beer (355 mL), one 5 oz glass of wine (148 mL), or one 1½ oz glass of hard liquor (44 mL).  Keep yourself hydrated with water, diet soda, or unsweetened iced tea. Keep in mind that regular soda, juice, and other mixers may contain a lot of sugar and must be counted as carbs.  What are tips for following this plan?  Reading food labels  Start by checking the serving size on the Nutrition Facts label of packaged foods and drinks. The number of calories and the amount of carbs, fats, and other nutrients listed on the label are based on one serving of the item. Many items contain more than one serving per package.  Check the total grams (g) of carbs in one serving.  Check the number of grams of saturated fats and trans fats in one serving. Choose foods that have a low amount or none of these fats.  Check the number of milligrams (mg) of salt (sodium) in one serving. Most people should limit total sodium intake to less than 2,300 mg per day.  Always check the nutrition information of foods labeled as \"low-fat\" or \"nonfat.\" These foods may be higher in added sugar or refined carbs and should be avoided.  Talk to your dietitian to identify your daily goals for nutrients listed on the label.  Shopping  Avoid buying canned, pre-made, or processed foods. These foods tend to be high in fat, sodium, and added sugar.  Shop around the outside edge of the grocery store. This is where you will most often find fresh fruits and vegetables, bulk grains, fresh meats, and fresh dairy products.  Cooking  Use low-heat cooking methods, such as baking, instead of high-heat cooking methods, such as deep frying.  Cook using healthy oils, such as olive, canola, or sunflower oil.  Avoid cooking with butter, cream, or high-fat meats.  Meal planning  Eat meals and snacks regularly, preferably at the same times every day. Avoid going long periods of " time without eating.  Eat foods that are high in fiber, such as fresh fruits, vegetables, beans, and whole grains.  Eat 4-6 oz (112-168 g) of lean protein each day, such as lean meat, chicken, fish, eggs, or tofu. One ounce (oz) (28 g) of lean protein is equal to:  1 oz (28 g) of meat, chicken, or fish.  1 egg.  ¼ cup (62 g) of tofu.  Eat some foods each day that contain healthy fats, such as avocado, nuts, seeds, and fish.  What foods should I eat?  Fruits  Berries. Apples. Oranges. Peaches. Apricots. Plums. Grapes. Mangoes. Papayas. Pomegranates. Kiwi. Cherries.  Vegetables  Leafy greens, including lettuce, spinach, kale, chard, dada greens, mustard greens, and cabbage. Beets. Cauliflower. Broccoli. Carrots. Green beans. Tomatoes. Peppers. Onions. Cucumbers. [place] sprouts.  Grains  Whole grains, such as whole-wheat or whole-grain bread, crackers, tortillas, cereal, and pasta. Unsweetened oatmeal. Quinoa. Brown or wild rice.  Meats and other proteins  Seafood. Poultry without skin. Lean cuts of poultry and beef. Tofu. Nuts. Seeds.  Dairy  Low-fat or fat-free dairy products such as milk, yogurt, and cheese.  The items listed above may not be a complete list of foods and beverages you can eat and drink. Contact a dietitian for more information.  What foods should I avoid?  Fruits  Fruits canned with syrup.  Vegetables  Canned vegetables. Frozen vegetables with butter or cream sauce.  Grains  Refined white flour and flour products such as bread, pasta, snack foods, and cereals. Avoid all processed foods.  Meats and other proteins  Fatty cuts of meat. Poultry with skin. Breaded or fried meats. Processed meat. Avoid saturated fats.  Dairy  Full-fat yogurt, cheese, or milk.  Beverages  Sweetened drinks, such as soda or iced tea.  The items listed above may not be a complete list of foods and beverages you should avoid. Contact a dietitian for more information.  Questions to ask a health care provider  Do I need to  meet with a certified diabetes care and ?  Do I need to meet with a dietitian?  What number can I call if I have questions?  When are the best times to check my blood glucose?  Where to find more information:  American Diabetes Association: diabetes.org  Academy of Nutrition and Dietetics: eatright.org  National [place] of Diabetes and Digestive and Kidney Diseases: niddk.nih.gov  Association of Diabetes Care & Education Specialists: diabeteseducator.org  Summary  It is important to have healthy eating habits because your blood sugar (glucose) levels are greatly affected by what you eat and drink. It is important to use alcohol carefully.  A healthy meal plan will help you manage your blood glucose and lower your risk of heart disease.  Your health care provider may recommend that you work with a dietitian to make a meal plan that is best for you.  This information is not intended to replace advice given to you by your health care provider. Make sure you discuss any questions you have with your health care provider.  Document Revised: 07/21/2021 Document Reviewed: 07/21/2021  Elsevier Patient Education © 2022 Elsevier Inc.

## 2024-01-25 NOTE — PROGRESS NOTES
Acute Office Visit      Date: 2024   Patient Name: Carlos Heller  : 1980   MRN: [id number]     Chief Complaint:    Chief Complaint   Patient presents with   • Diabetes     Discuss medication use.    • Numbness     Sitting on toilet after 5 minutes.        History of Present Illness: Carlos Heller is a 43 y.o. female in clinic today to change weight loss medication and with UTI symptoms.    Compression neuropathy  Patient reports her hand/arm will go numb when laying down on that side and that her legs go numb on the toilet. She reports the numbness improves when she changes positions.    Cystitis  Patient reports she had an UTI treated by a provider at work but didn't finish antibiotic and symptoms are coming back. She reports pain in her pelvis area. She states that the pain was so bad she had to have help walking to her car.     Prediabetes - Primary  Patient states the nurse at work has been checking her sugar and it has has been running 180-200. She also states she feels more hungry than usual and she's afraid her diabetes is coming back. She also states she wants to try another medication other than ozempic because it hurts her stomach.     Hidradenitis suppurativa  Patient states that her abscesses are coming back. She reports one in her underarm area and multiple between her legs and in her groin area.    Insomnia  Patient states the trazodone she is on is not helping her sleep at night.    Gastroesophageal reflux disease without esophagitis  Patient reports she was previously on Prilosec and it made her have palpitations so she is now taking about half a bottle of Tums a day for her heartburn.    Vitamin D deficiency  Patient reports previous low levels of vit D and would like to be evaluated for a deficiency today and to begin supplemental therapy if still low.       Subjective      Review of Systems:   Review of Systems   Constitutional:  Positive for appetite change.    Gastrointestinal:  Positive for GERD.   Genitourinary:  Positive for pelvic pain.   Skin:         abscesses   Neurological:  Positive for numbness.   All other systems reviewed and are negative.      I have reviewed the patients family history, social history, past medical history, past surgical history and have updated it as appropriate.     Medications:     Current Outpatient Medications:   •  buPROPion XL (WELLBUTRIN XL) 150 MG 24 hr tablet, Take 1 tablet by mouth Every Morning., Disp: 30 tablet, Rfl: 1  •  fluconazole (Diflucan) 150 MG tablet, Take 1 by mouth now the repeat in 3 days, Disp: 2 tablet, Rfl: 0  •  methylPREDNISolone (MEDROL) 4 MG dose pack, Take as directed on package instructions., Disp: 21 tablet, Rfl: 0  •  mupirocin (BACTROBAN) 2 % ointment, Apply 1 application topically to the appropriate area as directed 3 (Three) Times a Day., Disp: 15 g, Rfl: 3  •  traZODone (DESYREL) 100 MG tablet, Take 1 tablet by mouth Every Night., Disp: 60 tablet, Rfl: 1  •  vitamin B-12 (CYANOCOBALAMIN) 1000 MCG tablet, Take 1 tablet by mouth Daily., Disp: 60 tablet, Rfl: 0  •  doxycycline (VIBRAMYCIN) 100 MG capsule, Take 1 capsule by mouth Daily., Disp: 90 capsule, Rfl: 0  •  famotidine (Pepcid) 20 MG tablet, Take 1 tablet by mouth 2 (Two) Times a Day., Disp: 60 tablet, Rfl: 1  •  fluticasone (FLONASE) 50 MCG/ACT nasal spray, 2 sprays into the nostril(s) as directed by provider Daily for 30 days., Disp: 16 g, Rfl: 0    Allergies:   Allergies   Allergen Reactions   • Influenza Vaccines GI Intolerance     Other reaction(s): GI Intolerance   • Sulfamethoxazole-Trimethoprim Unknown - Low Severity   • Wellbutrin [Bupropion] Palpitations       Objective     Physical Exam: Please see above  Vital Signs:   Vitals:    01/25/24 0816   BP: 122/86   BP [facility]: Right arm   Patient Position: Sitting   Cuff Size: Adult   Pulse: 74   Resp: 16   Temp: 97.3 °F (36.3 °C)   TempSrc: Temporal   Weight: 104 kg (229 lb 6.4 oz)    PainSc: 0-No pain     Body mass index is 35.93 kg/m².    Physical Exam  Constitutional:       General: She is not in acute distress.     Appearance: Normal appearance. She is not ill-appearing.   HENT:      Right Ear: External ear normal.      Left Ear: External ear normal.   Eyes:      General:         Right eye: No discharge.         Left eye: No discharge.   Pulmonary:      Effort: Pulmonary effort is normal. No respiratory distress.   Skin:     General: Skin is warm and dry.   Neurological:      Mental Status: She is alert.   Psychiatric:         Attention and Perception: Attention normal.         Mood and Affect: Mood normal.         Speech: Speech normal.             Results:   Labs:   Hemoglobin A1C   Date Value Ref Range Status   03/29/2023 5.5 % Final   07/17/2018 8.10 (H) 4.80 - 5.60 % Final     TSH   Date Value Ref Range Status   03/29/2023 1.190 0.450 - 4.500 uIU/mL Final        Imaging:   No valid procedures specified.     Assessment / Plan      Assessment/Plan:   Problem List Items Addressed This Visit       Prediabetes - Primary    Overview     Previously had worsened GI symptoms with ozempic         Relevant Orders    POC Glycosylated Hemoglobin (Hb A1C)    POC Microalbumin    Basic Metabolic Panel    Tobacco use disorder    Hyperlipidemia    Relevant Orders    Lipid Panel    Gastroesophageal reflux disease without esophagitis    Relevant Medications    famotidine (Pepcid) 20 MG tablet    Hidradenitis suppurativa    Relevant Medications    doxycycline (VIBRAMYCIN) 100 MG capsule    Other Relevant Orders    Ambulatory Referral to Dermatology (Completed)    Cystitis    Relevant Orders    Urine Culture - , Urine, Clean Catch    Urinalysis With Microscopic If Indicated (No Culture) - Urine, Clean Catch    Benign essential microscopic hematuria    Relevant Orders    Ambulatory Referral to Urology    Compression neuropathy    Vitamin D deficiency    Relevant Orders    Vitamin D,25-Hydroxy     Other Visit  Diagnoses       Insomnia, unspecified type        Relevant Medications    traZODone (DESYREL) 100 MG tablet             Compression neuropathy  Patient encouraged to follow up if symptoms persist after change in position.    2.   Cystitis/Benign essential microscopic hematuria  Will evaluate current symptoms with the following labs and referral for persistent hematuria   -Urine Culture - , Urine, Clean Catch   -Urinalysis With Microscopic If Indicated (No Culture) - Urine, Clean Catch   -Ambulatory Referral to Urology    3.    Prediabetes - Primary  Patient complains of increased hunger and elevated blood sugar checks outside of clinic, will check the following labs:  -POC Glycosylated Hemoglobin (Hb A1C)  -POC Microalbumin  -Basic Metabolic Panel  Patient complains of stomach related side effects on Ozempic, plan will be to prescribe another medication pending her A1C result.     4.     Hidradenitis suppurativa  Recurrent/persistent abscesses, following orders were placed to treat current infection and further evaluate  -Referral to dermatology  -doxycycline (VIBRAMYCIN) 100 MG capsule once a day for 3 months    5.     Insomnia  Increased the following medication to help improve symptoms  -trazodone dose 100 mg     6.    Gastroesophageal reflux disease without esophagitis  Discontinued her Prilosec and ordered the following medication   -famotidine (Pepcid) 20 MG tablet    7.     Vitamin D deficiency  Will recheck levels today and supplement as needed.       Follow Up:   Return in about 4 weeks (around 2/22/2024) for Annual.      PATRICIA Pollock Student    I have reviewed the notes, assessments, and/or procedures performed by Marianela Cuello, I concur with her/his documentation of Carlos Heller.

## 2024-01-26 LAB
25(OH)D3+25(OH)D2 SERPL-MCNC: 12.9 NG/ML (ref 30–100)
BUN SERPL-MCNC: 4 MG/DL (ref 6–20)
BUN/CREAT SERPL: 5.6 (ref 7–25)
CALCIUM SERPL-MCNC: 9.1 MG/DL (ref 8.6–10.5)
CHLORIDE SERPL-SCNC: 108 MMOL/L (ref 98–107)
CHOLEST SERPL-MCNC: 183 MG/DL (ref 0–200)
CO2 SERPL-SCNC: 27.8 MMOL/L (ref 22–29)
CREAT SERPL-MCNC: 0.72 MG/DL (ref 0.57–1)
EGFRCR SERPLBLD CKD-EPI 2021: 107.9 ML/MIN/1.73
GLUCOSE SERPL-MCNC: 78 MG/DL (ref 65–99)
HDLC SERPL-MCNC: 40 MG/DL (ref 40–60)
LDLC SERPL CALC-MCNC: 123 MG/DL (ref 0–100)
POTASSIUM SERPL-SCNC: 4.7 MMOL/L (ref 3.5–5.2)
SODIUM SERPL-SCNC: 144 MMOL/L (ref 136–145)
TRIGL SERPL-MCNC: 108 MG/DL (ref 0–150)
VLDLC SERPL CALC-MCNC: 20 MG/DL (ref 5–40)

## 2024-01-27 LAB
APPEARANCE UR: CLEAR
BACTERIA #/AREA URNS HPF: ABNORMAL /HPF
BACTERIA UR CULT: NO GROWTH
BACTERIA UR CULT: NORMAL
BILIRUB UR QL STRIP: NEGATIVE
CASTS URNS MICRO: ABNORMAL
COLOR UR: YELLOW
EPI CELLS #/AREA URNS HPF: ABNORMAL /HPF
GLUCOSE UR QL STRIP: NEGATIVE
HGB UR QL STRIP: ABNORMAL
KETONES UR QL STRIP: NEGATIVE
LEUKOCYTE ESTERASE UR QL STRIP: NEGATIVE
NITRITE UR QL STRIP: NEGATIVE
PH UR STRIP: 6.5 [PH] (ref 5–8)
PROT UR QL STRIP: NEGATIVE
RBC #/AREA URNS HPF: ABNORMAL /HPF
SP GR UR STRIP: 1.01 (ref 1–1.03)
UROBILINOGEN UR STRIP-MCNC: ABNORMAL MG/DL
WBC #/AREA URNS HPF: ABNORMAL /HPF

## 2024-01-29 RX ORDER — ERGOCALCIFEROL 1.25 MG/1
50000 CAPSULE ORAL WEEKLY
Qty: 6 CAPSULE | Refills: 0 | Status: SHIPPED | OUTPATIENT
Start: 2024-01-29

## 2024-02-05 DIAGNOSIS — R73.03 PREDIABETES: ICD-10-CM

## 2024-02-05 DIAGNOSIS — E66.01 CLASS 2 SEVERE OBESITY DUE TO EXCESS CALORIES WITH SERIOUS COMORBIDITY AND BODY MASS INDEX (BMI) OF 38.0 TO 38.9 IN ADULT: Primary | ICD-10-CM

## 2024-02-19 DIAGNOSIS — E11.65 TYPE 2 DIABETES MELLITUS WITH HYPERGLYCEMIA, WITHOUT LONG-TERM CURRENT USE OF INSULIN: Primary | ICD-10-CM

## 2024-04-01 ENCOUNTER — TELEPHONE (OUTPATIENT)
Dept: INTERNAL MEDICINE | Facility: CLINIC | Age: 44
End: 2024-04-01

## 2024-04-01 ENCOUNTER — OFFICE VISIT (OUTPATIENT)
Dept: INTERNAL MEDICINE | Facility: CLINIC | Age: 44
End: 2024-04-01
Payer: COMMERCIAL

## 2024-04-01 VITALS
WEIGHT: 231.6 LBS | SYSTOLIC BLOOD PRESSURE: 122 MMHG | BODY MASS INDEX: 36.27 KG/M2 | HEART RATE: 76 BPM | TEMPERATURE: 97.3 F | RESPIRATION RATE: 18 BRPM | DIASTOLIC BLOOD PRESSURE: 84 MMHG

## 2024-04-01 DIAGNOSIS — M54.41 CHRONIC BILATERAL LOW BACK PAIN WITH BILATERAL SCIATICA: ICD-10-CM

## 2024-04-01 DIAGNOSIS — Z12.31 ENCOUNTER FOR SCREENING MAMMOGRAM FOR MALIGNANT NEOPLASM OF BREAST: ICD-10-CM

## 2024-04-01 DIAGNOSIS — N76.0 BACTERIAL VAGINOSIS: ICD-10-CM

## 2024-04-01 DIAGNOSIS — Z00.00 HEALTHCARE MAINTENANCE: Primary | ICD-10-CM

## 2024-04-01 DIAGNOSIS — E53.8 B12 DEFICIENCY: ICD-10-CM

## 2024-04-01 DIAGNOSIS — R31.1 BENIGN ESSENTIAL MICROSCOPIC HEMATURIA: ICD-10-CM

## 2024-04-01 DIAGNOSIS — G89.29 CHRONIC BILATERAL LOW BACK PAIN WITH BILATERAL SCIATICA: ICD-10-CM

## 2024-04-01 DIAGNOSIS — E78.5 HYPERLIPIDEMIA, UNSPECIFIED HYPERLIPIDEMIA TYPE: ICD-10-CM

## 2024-04-01 DIAGNOSIS — E11.65 TYPE 2 DIABETES MELLITUS WITH HYPERGLYCEMIA, WITHOUT LONG-TERM CURRENT USE OF INSULIN: ICD-10-CM

## 2024-04-01 DIAGNOSIS — E66.01 CLASS 2 SEVERE OBESITY DUE TO EXCESS CALORIES WITH SERIOUS COMORBIDITY AND BODY MASS INDEX (BMI) OF 38.0 TO 38.9 IN ADULT: ICD-10-CM

## 2024-04-01 DIAGNOSIS — E55.9 VITAMIN D DEFICIENCY: ICD-10-CM

## 2024-04-01 DIAGNOSIS — B96.89 BACTERIAL VAGINOSIS: ICD-10-CM

## 2024-04-01 DIAGNOSIS — L73.2 HIDRADENITIS SUPPURATIVA: ICD-10-CM

## 2024-04-01 DIAGNOSIS — Z13.9 ENCOUNTER FOR SCREENING: ICD-10-CM

## 2024-04-01 DIAGNOSIS — M54.42 CHRONIC BILATERAL LOW BACK PAIN WITH BILATERAL SCIATICA: ICD-10-CM

## 2024-04-01 DIAGNOSIS — D50.9 IRON DEFICIENCY ANEMIA, UNSPECIFIED IRON DEFICIENCY ANEMIA TYPE: ICD-10-CM

## 2024-04-01 DIAGNOSIS — Z23 ENCOUNTER FOR VACCINATION: ICD-10-CM

## 2024-04-01 DIAGNOSIS — F17.200 TOBACCO USE DISORDER: ICD-10-CM

## 2024-04-01 DIAGNOSIS — R10.2 PELVIC PAIN: ICD-10-CM

## 2024-04-01 DIAGNOSIS — N30.90 CYSTITIS: ICD-10-CM

## 2024-04-01 LAB
25(OH)D3 SERPL-MCNC: 15.5 NG/ML (ref 30–100)
BASOPHILS # BLD AUTO: 0.03 10*3/MM3 (ref 0–0.2)
BASOPHILS NFR BLD AUTO: 0.4 % (ref 0–1.5)
BILIRUB UR QL STRIP: NEGATIVE
CLARITY UR: ABNORMAL
COLOR UR: YELLOW
DEPRECATED RDW RBC AUTO: 39 FL (ref 37–54)
EOSINOPHIL # BLD AUTO: 0.24 10*3/MM3 (ref 0–0.4)
EOSINOPHIL NFR BLD AUTO: 3.2 % (ref 0.3–6.2)
ERYTHROCYTE [DISTWIDTH] IN BLOOD BY AUTOMATED COUNT: 12.7 % (ref 12.3–15.4)
FERRITIN SERPL-MCNC: 114 NG/ML (ref 13–150)
GLUCOSE UR STRIP-MCNC: NEGATIVE MG/DL
HCT VFR BLD AUTO: 42.4 % (ref 34–46.6)
HGB BLD-MCNC: 13.8 G/DL (ref 12–15.9)
HGB UR QL STRIP.AUTO: NEGATIVE
IMM GRANULOCYTES # BLD AUTO: 0.01 10*3/MM3 (ref 0–0.05)
IMM GRANULOCYTES NFR BLD AUTO: 0.1 % (ref 0–0.5)
KETONES UR QL STRIP: NEGATIVE
LEUKOCYTE ESTERASE UR QL STRIP.AUTO: ABNORMAL
LYMPHOCYTES # BLD AUTO: 3.66 10*3/MM3 (ref 0.7–3.1)
LYMPHOCYTES NFR BLD AUTO: 49.2 % (ref 19.6–45.3)
MCH RBC QN AUTO: 28 PG (ref 26.6–33)
MCHC RBC AUTO-ENTMCNC: 32.5 G/DL (ref 31.5–35.7)
MCV RBC AUTO: 86 FL (ref 79–97)
MONOCYTES # BLD AUTO: 0.58 10*3/MM3 (ref 0.1–0.9)
MONOCYTES NFR BLD AUTO: 7.8 % (ref 5–12)
NEUTROPHILS NFR BLD AUTO: 2.92 10*3/MM3 (ref 1.7–7)
NEUTROPHILS NFR BLD AUTO: 39.3 % (ref 42.7–76)
NITRITE UR QL STRIP: NEGATIVE
NRBC BLD AUTO-RTO: 0 /100 WBC (ref 0–0.2)
PH UR STRIP.AUTO: 5.5 [PH] (ref 5–8)
PLATELET # BLD AUTO: 258 10*3/MM3 (ref 140–450)
PMV BLD AUTO: 10.1 FL (ref 6–12)
PROT UR QL STRIP: NEGATIVE
RBC # BLD AUTO: 4.93 10*6/MM3 (ref 3.77–5.28)
RETICS # AUTO: 0.06 10*6/MM3 (ref 0.02–0.13)
RETICS/RBC NFR AUTO: 1.23 % (ref 0.7–1.9)
SP GR UR STRIP: 1.01 (ref 1–1.03)
UROBILINOGEN UR QL STRIP: ABNORMAL
VIT B12 BLD-MCNC: <150 PG/ML (ref 211–946)
WBC NRBC COR # BLD AUTO: 7.44 10*3/MM3 (ref 3.4–10.8)

## 2024-04-01 PROCEDURE — 82607 VITAMIN B-12: CPT | Performed by: STUDENT IN AN ORGANIZED HEALTH CARE EDUCATION/TRAINING PROGRAM

## 2024-04-01 PROCEDURE — 87801 DETECT AGNT MULT DNA AMPLI: CPT | Performed by: STUDENT IN AN ORGANIZED HEALTH CARE EDUCATION/TRAINING PROGRAM

## 2024-04-01 PROCEDURE — 87591 N.GONORRHOEAE DNA AMP PROB: CPT | Performed by: STUDENT IN AN ORGANIZED HEALTH CARE EDUCATION/TRAINING PROGRAM

## 2024-04-01 PROCEDURE — 85045 AUTOMATED RETICULOCYTE COUNT: CPT | Performed by: STUDENT IN AN ORGANIZED HEALTH CARE EDUCATION/TRAINING PROGRAM

## 2024-04-01 PROCEDURE — 82306 VITAMIN D 25 HYDROXY: CPT | Performed by: STUDENT IN AN ORGANIZED HEALTH CARE EDUCATION/TRAINING PROGRAM

## 2024-04-01 PROCEDURE — 85025 COMPLETE CBC W/AUTO DIFF WBC: CPT | Performed by: STUDENT IN AN ORGANIZED HEALTH CARE EDUCATION/TRAINING PROGRAM

## 2024-04-01 PROCEDURE — 87086 URINE CULTURE/COLONY COUNT: CPT | Performed by: STUDENT IN AN ORGANIZED HEALTH CARE EDUCATION/TRAINING PROGRAM

## 2024-04-01 PROCEDURE — 81001 URINALYSIS AUTO W/SCOPE: CPT | Performed by: STUDENT IN AN ORGANIZED HEALTH CARE EDUCATION/TRAINING PROGRAM

## 2024-04-01 PROCEDURE — 87798 DETECT AGENT NOS DNA AMP: CPT | Performed by: STUDENT IN AN ORGANIZED HEALTH CARE EDUCATION/TRAINING PROGRAM

## 2024-04-01 PROCEDURE — 87661 TRICHOMONAS VAGINALIS AMPLIF: CPT | Performed by: STUDENT IN AN ORGANIZED HEALTH CARE EDUCATION/TRAINING PROGRAM

## 2024-04-01 PROCEDURE — 87491 CHLMYD TRACH DNA AMP PROBE: CPT | Performed by: STUDENT IN AN ORGANIZED HEALTH CARE EDUCATION/TRAINING PROGRAM

## 2024-04-01 PROCEDURE — 82728 ASSAY OF FERRITIN: CPT | Performed by: STUDENT IN AN ORGANIZED HEALTH CARE EDUCATION/TRAINING PROGRAM

## 2024-04-01 RX ORDER — DOXYCYCLINE HYCLATE 100 MG/1
100 CAPSULE ORAL 2 TIMES DAILY
Qty: 180 CAPSULE | Refills: 0 | Status: SHIPPED | OUTPATIENT
Start: 2024-04-01

## 2024-04-01 RX ORDER — METFORMIN HYDROCHLORIDE 500 MG/1
500 TABLET, EXTENDED RELEASE ORAL
Qty: 90 TABLET | Refills: 1 | Status: SHIPPED | OUTPATIENT
Start: 2024-04-01

## 2024-04-01 NOTE — TELEPHONE ENCOUNTER
Message from Plan  System was not able to process the request because the previous Prior Authorization Request was Denied.

## 2024-04-01 NOTE — PROGRESS NOTES
Female Physical Note      Date: 2024   Patient Name: Carlos Heller  : 1980   MRN: [id number]     Chief Complaint:    Chief Complaint   Patient presents with    Urinary Tract Infection    Weight Loss       History of Present Illness: Carlos Heller is a 43 y.o. female who is here today for their annual health maintenance and physical.  History of Present Illness  The patient presents for evaluation of multiple medical concerns.    The patient reports difficulty in losing weight, despite her insurance not covering the cost of Mounjaro. She resumed taking Ozempic 0.5 mg two weeks ago, but discontinued its use due to adverse effects, including headaches. She has abstained from soda for the past few weeks.    The patient has been experiencing persistent body aches, predominantly in the lower back and radiating down both legs, for the past two months. She has been self-medicating with Tylenol and Motrin. She speculates that prolonged walking on concrete exacerbates her symptoms.    The patient has observed a change in the color of her leg cover over the past two months, which now turns purple.    The patient suspects a urinary tract infection (UTI) and suspects an internal issue in her pelvis. Despite receiving a phone call from urology, she missed two appointments.    The patient's hidradenitis has been unresponsive to doxycycline. She reports a recurrent boil in the crease, which she has been applying Band-Aids. The boil has decreased in size following a rupture, leaving it for 1 to 2 weeks before resolving. She does not believe it to be a boil, but rather an infection due to the unpleasant odor.    The patient queries the necessity of cholesterol medication due to her smoking habit. She successfully quit smoking for 6 years but resumed a year ago. She expresses interest in smoking cessation once her stress levels decrease.    The patient maintains an ophthalmologist, but requires a  referral to a dentist that accepts her insurance. She denies feeling unsafe in any situations or feeling down, depressed, hopeless, or like she has lost interest in things that she used to have interest in.         Subjective      Review of Systems:   Review of Systems   All other systems reviewed and are negative.      Past Medical History, Social History, Family History and Care Team were all reviewed with patient and updated as appropriate.     Medications:     Current Outpatient Medications:     buPROPion XL (WELLBUTRIN XL) 150 MG 24 hr tablet, Take 1 tablet by mouth Every Morning., Disp: 30 tablet, Rfl: 1    doxycycline (VIBRAMYCIN) 100 MG capsule, Take 1 capsule by mouth 2 (Two) Times a Day., Disp: 180 capsule, Rfl: 0    famotidine (Pepcid) 20 MG tablet, Take 1 tablet by mouth 2 (Two) Times a Day., Disp: 60 tablet, Rfl: 1    mupirocin (BACTROBAN) 2 % ointment, Apply 1 application topically to the appropriate area as directed 3 (Three) Times a Day., Disp: 15 g, Rfl: 3    traZODone (DESYREL) 100 MG tablet, Take 1 tablet by mouth Every Night., Disp: 60 tablet, Rfl: 1    vitamin D (ERGOCALCIFEROL) 1.25 MG (16213 UT) capsule capsule, Take 1 capsule by mouth 1 (One) Time Per Week., Disp: 6 capsule, Rfl: 0    fluticasone (FLONASE) 50 MCG/ACT nasal spray, 2 sprays into the nostril(s) as directed by provider Daily for 30 days., Disp: 16 g, Rfl: 0    metFORMIN ER (GLUCOPHAGE-XR) 500 MG 24 hr tablet, Take 1 tablet by mouth Daily With Breakfast., Disp: 90 tablet, Rfl: 1    metroNIDAZOLE (Flagyl) 500 MG tablet, Take 1 tablet by mouth 2 (Two) Times a Day for 7 days., Disp: 14 tablet, Rfl: 0    Semaglutide,0.25 or 0.5MG/DOS, (OZEMPIC) 2 MG/3ML solution pen-injector, Inject 0.25 mg under the skin into the appropriate area as directed 1 (One) Time Per Week., Disp: 3 mL, Rfl: 0    Current Facility-Administered Medications:     cyanocobalamin injection 1,000 mcg, 1,000 mcg, Intramuscular, Weekly, Virgil Barkley MD, 1,000 mcg  at 04/08/24 1430    Allergies:   Allergies   Allergen Reactions    Influenza Vaccines GI Intolerance     Other reaction(s): GI Intolerance    Sulfamethoxazole-Trimethoprim Unknown - Low Severity    Wellbutrin [Bupropion] Palpitations       Immunizations:  Immunization History   Administered Date(s) Administered    Influenza TIV (IM) 10/24/2007    Tdap 03/17/2023       Colorectal Screening:   Up-To-Date   Last Completed Colonoscopy       This patient has no relevant Health Maintenance data.          Pap:  N/A, s/p total hysterectomy  Last Completed Pap Smear            Discontinued - PAP SMEAR  Discontinued      No completion history exists for this topic.                   Mammogram:  Ordered  Last Completed Mammogram       This patient has no relevant Health Maintenance data.             CT for Smoker (Age 50-80, 20 pk yr):  N/A  Bone Density/DEXA (Age 65 or high risk): DEXA scan to be completed at age 65  Hep C (Age 18-79 once):  previously negative  HIV (Age 15-65 once): previously negative  A1c: completed  Lipid panel: completed      Dermatology: ordered  Ophthalmologist: established  Dentist: ordered    Tobacco Use: High Risk (4/1/2024)    Patient History     Smoking Tobacco Use: Every Day     Smokeless Tobacco Use: Never     Passive Exposure: Not on file       Social History     Substance and Sexual Activity   Alcohol Use No        Social History     Substance and Sexual Activity   Drug Use No        Diet/Physical activity: counseled on 04/08/24    PHQ-2 Depression Screening  Little interest or pleasure in doing things? 0-->not at all   Feeling down, depressed, or hopeless? 0-->not at all   PHQ-2 Total Score 0     PHQ-9 Total Score: 0     Intimate partner violence: (Screen on initial visit, pregnant women, women with injuries, older adult with injury or evidence of neglect): no concerns  Violence can be a problem in many people's lives, so I now ask every patient about trauma or abuse they may have  experienced in a relationship.  Stress/Safety - Do you feel safe in your relationship?  Afraid/Abused - Have you ever been in a relationship where you were threatened, hurt, or afraid?  Friend/Family - Are your friends aware you have been hurt?  Emergency Plan - Do you have a safe place to go and the resources you need in an emergency?    Osteoporosis: no concerns  Ost menopausal women < 65 with RF (advancing age, previous fracture, glucocorticoid therapy, parental hip fracture, low body weight, current cigarette smoking, excessive alcohol consumption, rheumatoid arthritis, secondary osteoporosis [hypogonadism/premature menopause, malabsorption, chronic liver disease, IBD]).  All women 65 or older    Objective     Physical Exam:  Vital Signs:   Vitals:    04/01/24 1156   BP: 122/84   BP [facility]: Right arm   Patient Position: Sitting   Cuff Size: Adult   Pulse: 76   Resp: 18   Temp: 97.3 °F (36.3 °C)   TempSrc: Temporal   Weight: 105 kg (231 lb 9.6 oz)   PainSc: 0-No pain     Body mass index is 36.27 kg/m².     Physical Exam  Vitals and nursing note reviewed.   Constitutional:       General: She is not in acute distress.     Appearance: Normal appearance. She is obese. She is not ill-appearing or toxic-appearing.   HENT:      Nose: No congestion or rhinorrhea.   Eyes:      General:         Right eye: No discharge.         Left eye: No discharge.      Conjunctiva/sclera: Conjunctivae normal.   Cardiovascular:      Rate and Rhythm: Normal rate and regular rhythm.      Heart sounds: Normal heart sounds. No murmur heard.     No friction rub.   Pulmonary:      Effort: Pulmonary effort is normal. No respiratory distress.      Breath sounds: Normal breath sounds. No wheezing or rhonchi.   Abdominal:      General: Abdomen is flat. Bowel sounds are normal. There is no distension.      Palpations: Abdomen is soft. There is no mass.      Tenderness: There is no abdominal tenderness. There is no guarding or rebound.    Musculoskeletal:      Cervical back: Normal range of motion.      Right lower leg: No edema.      Left lower leg: No edema.   Skin:     Findings: No lesion or rash.   Neurological:      General: No focal deficit present.      Mental Status: She is alert. Mental status is at baseline.      Coordination: Coordination normal.      Gait: Gait normal.   Psychiatric:         Mood and Affect: Mood normal.         Behavior: Behavior normal.         Thought Content: Thought content normal.         Judgment: Judgment normal.         The 10-year ASCVD risk score (Jayson YANES, et al., 2019) is: 4.5%    Values used to calculate the score:      Age: 43 years      Sex: Female      Is Non- : Yes      Diabetic: Yes      Tobacco smoker: Yes      Systolic Blood Pressure: 122 mmHg      Is BP treated: No      HDL Cholesterol: 40 mg/dL      Total Cholesterol: 183 mg/dL      Procedures    Assessment / Plan      Assessment/Plan:   Problem List Items Addressed This Visit       Type 2 diabetes mellitus with hyperglycemia, without long-term current use of insulin    Overview     Previously had worsened GI symptoms with ozempic         Relevant Medications    Semaglutide,0.25 or 0.5MG/DOS, (OZEMPIC) 2 MG/3ML solution pen-injector    metFORMIN ER (GLUCOPHAGE-XR) 500 MG 24 hr tablet    Tobacco use disorder    Hyperlipidemia    Class 2 obesity due to excess calories with body mass index (BMI) of 38.0 to 38.9 in adult    Hidradenitis suppurativa    Relevant Medications    doxycycline (VIBRAMYCIN) 100 MG capsule    metFORMIN ER (GLUCOPHAGE-XR) 500 MG 24 hr tablet    Other Relevant Orders    Ambulatory Referral to Dermatology (Completed)    Iron deficiency anemia    Relevant Orders    CBC & Differential (Completed)    Ferritin (Completed)    Reticulocytes (Completed)    B12 deficiency    Relevant Orders    Vitamin B12 (Completed)    Benign essential microscopic hematuria    Relevant Orders    Urine Culture - , Urine, Clean  Catch (Completed)    Urinalysis With Microscopic If Indicated (No Culture) - Urine, Clean Catch (Completed)    CBC & Differential (Completed)    Ambulatory Referral to Urology    Urinalysis, Microscopic Only - Urine, Clean Catch (Completed)    Vitamin D deficiency    Relevant Medications    vitamin D (ERGOCALCIFEROL) 1.25 MG (07010 UT) capsule capsule    Other Relevant Orders    Vitamin D,25-Hydroxy (Completed)    Chronic bilateral low back pain with bilateral sciatica    Relevant Orders    Ambulatory Referral to Physical Therapy Evaluate and treat, Ortho     Other Visit Diagnoses       Healthcare maintenance    -  Primary    Encounter for vaccination        Cystitis        Relevant Orders    Urine Culture - , Urine, Clean Catch (Completed)    Urinalysis With Microscopic If Indicated (No Culture) - Urine, Clean Catch (Completed)    Urinalysis, Microscopic Only - Urine, Clean Catch (Completed)    Urine Culture - , Urine, Clean Catch    Urinalysis With Microscopic If Indicated (No Culture) - Urine, Clean Catch    Pelvic pain        Relevant Orders    NuSwab VG+, Candida 6sp (Completed)    US Non-ob Transvaginal    Encounter for screening        Relevant Orders    Ambulatory Referral to Dentistry    Encounter for screening mammogram for malignant neoplasm of breast        Relevant Orders    Mammo Screening Digital Tomosynthesis Bilateral With CAD    Bacterial vaginosis        Relevant Medications    doxycycline (VIBRAMYCIN) 100 MG capsule    metroNIDAZOLE (Flagyl) 500 MG tablet          Assessment & Plan  1. Diabetes Mellitus.  The patient's A1c levels have shown significant improvement, and her weight has decreased from 235 to 231. A prescription for Ozempic has been issued.    2. Body Aches.  The patient has been advised to wear supportive footwear. Additional weight loss may alleviate her back pain, hip pain, and knee pain. Lidocaine patches may be applied to different areas of her legs and back.  Physical therapy  referral has been sent.    3. Varicose Veins.  The patient was reassured that the color changes in her legs is a normal occurrence.    4. Hidradenitis.  An increase the potency of her current antibiotic regimen will be prescribed in addition to metformin. A referral to dermatology has been made.    5. Microscopic Hematuria  A referral to urology has been made. Urine studies and lab studies will be ordered. A transvaginal ultrasound will be ordered to rule out any structural concerns. A self-swab will be obtained.    6. Vitamin D and B12 Deficiency.  Vitamin D and B12 levels will be rechecked.    7. Elevated Cholesterol.  Her ASCVD risk score is 4.5 percent. The patient has been advised to quit smoking. Nicotine replacement patches, gum, or lozenges will be prescribed.    8. Iron Deficiency Anemia.  Iron levels will be rechecked.    9. Health maintenance.  Given her hysterectomy, additional Pap smears are not necessary. A mammogram has been ordered.    Follow-up  The patient is scheduled for a follow-up visit in 1 month.    Results  Laboratory Studies  A1c is 5.3. Vitamin D and B12 deficiencies detected. LDL cholesterol slightly elevated.    Healthcare Maintenance:  Counseling provided based on age appropriate USPSTF guidelines.       Carlos Heller voices understanding and acceptance of this advice and will call back with any further questions or concerns. AVS with preventive healthcare tips printed for patient.     “Discussed risks/benefits to vaccination, reviewed components of the vaccine, discussed VIS, discussed informed consent, informed consent obtained. Patient/Parent was allowed to accept or refuse vaccine. Questions answered to satisfactory state of patient/Parent. We reviewed typical age appropriate and seasonally appropriate vaccinations. Reviewed immunization history and updated state vaccination form as needed. Patient was counseled on COVID-19    Follow Up:   Return in about 4 weeks (around  4/29/2024) for Recheck.    Patient or patient representative verbalized consent for the use of Ambient Listening during the visit with  Virgil Barkley MD for chart documentation. 4/8/2024  12:33 EDT      Virgil Barkley MD  [facility] TER[facility] [place]

## 2024-04-01 NOTE — PATIENT INSTRUCTIONS
Health Maintenance, Female  Adopting a healthy lifestyle and getting preventive care can go a long way to promote health and wellness. Talk with your health care provider about what schedule of regular examinations is right for you. This is a good chance for you to check in with your provider about disease prevention and staying healthy.  In between checkups, there are plenty of things you can do on your own. Experts have done a lot of research about which lifestyle changes and preventive measures are most likely to keep you healthy. Ask your health care provider for more information.  Weight and diet  Eat a healthy diet  Be sure to include plenty of vegetables, fruits, low-fat dairy products, and lean protein.  Do not eat a lot of foods high in solid fats, added sugars, or salt.  Get regular exercise. This is one of the most important things you can do for your health.  Most adults should exercise for at least 150 minutes each week. The exercise should increase your heart rate and make you sweat (moderate-intensity exercise).  Most adults should also do strengthening exercises at least twice a week. This is in addition to the moderate-intensity exercise.     Maintain a healthy weight  Body mass index (BMI) is a measurement that can be used to identify possible weight problems. It estimates body fat based on height and weight. Your health care provider can help determine your BMI and help you achieve or maintain a healthy weight.  For females 20 years of age and older:  A BMI below 18.5 is considered underweight.  A BMI of 18.5 to 24.9 is normal.  A BMI of 25 to 29.9 is considered overweight.  A BMI of 30 and above is considered obese.     Watch levels of cholesterol and blood lipids  You should start having your blood tested for lipids and cholesterol at 20 years of age, then have this test every 5 years.  You may need to have your cholesterol levels checked more often if:  Your lipid or cholesterol levels are  high.  You are older than 50 years of age.  You are at high risk for heart disease.     Cancer screening  Lung Cancer  Lung cancer screening is recommended for adults 55-80 years old who are at high risk for lung cancer because of a history of smoking.  A yearly low-dose CT scan of the lungs is recommended for people who:  Currently smoke.  Have quit within the past 15 years.  Have at least a 30-pack-year history of smoking. A pack year is smoking an average of one pack of cigarettes a day for 1 year.  Yearly screening should continue until it has been 15 years since you quit.  Yearly screening should stop if you develop a health problem that would prevent you from having lung cancer treatment.     Breast Cancer  Practice breast self-awareness. This means understanding how your breasts normally appear and feel.  It also means doing regular breast self-exams. Let your health care provider know about any changes, no matter how small.  If you are in your 20s or 30s, you should have a clinical breast exam (CBE) by a health care provider every 1-3 years as part of a regular health exam.  If you are 40 or older, have a CBE every year. Also consider having a breast X-ray (mammogram) every year.  If you have a family history of breast cancer, talk to your health care provider about genetic screening.  If you are at high risk for breast cancer, talk to your health care provider about having an MRI and a mammogram every year.  Breast cancer gene (BRCA) assessment is recommended for women who have family members with BRCA-related cancers. BRCA-related cancers include:  Breast.  Ovarian.  Tubal.  Peritoneal cancers.  Results of the assessment will determine the need for genetic counseling and BRCA1 and BRCA2 testing.     Cervical Cancer  Your health care provider may recommend that you be screened regularly for cancer of the pelvic organs (ovaries, uterus, and vagina). This screening involves a pelvic examination, including  checking for microscopic changes to the surface of your cervix (Pap test). You may be encouraged to have this screening done every 3 years, beginning at age 21.  For women ages 30-65, health care providers may recommend pelvic exams and Pap testing every 3 years, or they may recommend the Pap and pelvic exam, combined with testing for human papilloma virus (HPV), every 5 years. Some types of HPV increase your risk of cervical cancer. Testing for HPV may also be done on women of any age with unclear Pap test results.  Other health care providers may not recommend any screening for nonpregnant women who are considered low risk for pelvic cancer and who do not have symptoms. Ask your health care provider if a screening pelvic exam is right for you.  If you have had past treatment for cervical cancer or a condition that could lead to cancer, you need Pap tests and screening for cancer for at least 20 years after your treatment. If Pap tests have been discontinued, your risk factors (such as having a new sexual partner) need to be reassessed to determine if screening should resume. Some women have medical problems that increase the chance of getting cervical cancer. In these cases, your health care provider may recommend more frequent screening and Pap tests.     Colorectal Cancer  This type of cancer can be detected and often prevented.  Routine colorectal cancer screening usually begins at 50 years of age and continues through 75 years of age.  Your health care provider may recommend screening at an earlier age if you have risk factors for colon cancer.  Your health care provider may also recommend using home test kits to check for hidden blood in the stool.  A small camera at the end of a tube can be used to examine your colon directly (sigmoidoscopy or colonoscopy). This is done to check for the earliest forms of colorectal cancer.  Routine screening usually begins at age 50.  Direct examination of the colon should  be repeated every 5-10 years through 75 years of age. However, you may need to be screened more often if early forms of precancerous polyps or small growths are found.     Skin Cancer  Check your skin from head to toe regularly.  Tell your health care provider about any new moles or changes in moles, especially if there is a change in a mole's shape or color.  Also tell your health care provider if you have a mole that is larger than the size of a pencil eraser.  Always use sunscreen. Apply sunscreen liberally and repeatedly throughout the day.  Protect yourself by wearing long sleeves, pants, a wide-brimmed hat, and sunglasses whenever you are outside.     Heart disease, diabetes, and high blood pressure  High blood pressure causes heart disease and increases the risk of stroke. High blood pressure is more likely to develop in:  People who have blood pressure in the high end of the normal range (130-139/85-89 mm Hg).  People who are overweight or obese.  People who are .  If you are 18-39 years of age, have your blood pressure checked every 3-5 years. If you are 40 years of age or older, have your blood pressure checked every year. You should have your blood pressure measured twice--once when you are at a hospital or clinic, and once when you are not at a hospital or clinic. Record the average of the two measurements. To check your blood pressure when you are not at a hospital or clinic, you can use:  An automated blood pressure machine at a pharmacy.  A home blood pressure monitor.  If you are between 55 years and 79 years old, ask your health care provider if you should take aspirin to prevent strokes.  Have regular diabetes screenings. This involves taking a blood sample to check your fasting blood sugar level.  If you are at a normal weight and have a low risk for diabetes, have this test once every three years after 45 years of age.  If you are overweight and have a high risk for diabetes,  consider being tested at a younger age or more often.  Preventing infection  Hepatitis B  If you have a higher risk for hepatitis B, you should be screened for this virus. You are considered at high risk for hepatitis B if:  You were born in a country where hepatitis B is common. Ask your health care provider which countries are considered high risk.  Your parents were born in a high-risk country, and you have not been immunized against hepatitis B (hepatitis B vaccine).  You have HIV or AIDS.  You use needles to inject street drugs.  You live with someone who has hepatitis B.  You have had sex with someone who has hepatitis B.  You get hemodialysis treatment.  You take certain medicines for conditions, including cancer, organ transplantation, and autoimmune conditions.     Hepatitis C  Blood testing is recommended for:  Everyone born from 1945 through 1965.  Anyone with known risk factors for hepatitis C.     Sexually transmitted infections (STIs)  You should be screened for sexually transmitted infections (STIs) including gonorrhea and chlamydia if:  You are sexually active and are younger than 24 years of age.  You are older than 24 years of age and your health care provider tells you that you are at risk for this type of infection.  Your sexual activity has changed since you were last screened and you are at an increased risk for chlamydia or gonorrhea. Ask your health care provider if you are at risk.  If you do not have HIV, but are at risk, it may be recommended that you take a prescription medicine daily to prevent HIV infection. This is called pre-exposure prophylaxis (PrEP). You are considered at risk if:  You are sexually active and do not regularly use condoms or know the HIV status of your partner(s).  You take drugs by injection.  You are sexually active with a partner who has HIV.     Talk with your health care provider about whether you are at high risk of being infected with HIV. If you choose to  begin PrEP, you should first be tested for HIV. You should then be tested every 3 months for as long as you are taking PrEP.  Pregnancy  If you are premenopausal and you may become pregnant, ask your health care provider about preconception counseling.  If you may become pregnant, take 400 to 800 micrograms (mcg) of folic acid every day.  If you want to prevent pregnancy, talk to your health care provider about birth control (contraception).  Osteoporosis and menopause  Osteoporosis is a disease in which the bones lose minerals and strength with aging. This can result in serious bone fractures. Your risk for osteoporosis can be identified using a bone density scan.  If you are 65 years of age or older, or if you are at risk for osteoporosis and fractures, ask your health care provider if you should be screened.  Ask your health care provider whether you should take a calcium or vitamin D supplement to lower your risk for osteoporosis.  Menopause may have certain physical symptoms and risks.  Hormone replacement therapy may reduce some of these symptoms and risks.  Talk to your health care provider about whether hormone replacement therapy is right for you.  Follow these instructions at home:  Schedule regular health, dental, and eye exams.  Stay current with your immunizations.  Do not use any tobacco products including cigarettes, chewing tobacco, or electronic cigarettes.  If you are pregnant, do not drink alcohol.  If you are breastfeeding, limit how much and how often you drink alcohol.  Limit alcohol intake to no more than 1 drink per day for nonpregnant women. One drink equals 12 ounces of beer, 5 ounces of wine, or 1½ ounces of hard liquor.  Do not use street drugs.  Do not share needles.  Ask your health care provider for help if you need support or information about quitting drugs.  Tell your health care provider if you often feel depressed.  Tell your health care provider if you have ever been abused or do  not feel safe at home.  This information is not intended to replace advice given to you by your health care provider. Make sure you discuss any questions you have with your health care provider.  Document Released: 07/02/2012 Document Revised: 05/25/2017 Document Reviewed: 09/20/2016  [employer] Interactive Patient Education © 2018 Elsevier Inc.         Healthy Delicious Recipes from [employer]: https://[employer]/  [place] Plant Based Meal Recipes: https://Locately/  Heart Healthy Recipes from [employer]: https://abcardio.org/wp-content/uploads/2020/06/ABC_Cookbook.pdf  [place] Healthy Living Guide: https://www.[facility].[place].edu/nutritionsource/2022/01/06/healthy-living-guide-7946-8955/  SNAP Cookbook for Budgets: http://[employer]/dss/documents/good-and-cheap.pdf     Diabetes Mellitus and Nutrition, Adult  When you have diabetes, or diabetes mellitus, it is very important to have healthy eating habits because your blood sugar (glucose) levels are greatly affected by what you eat and drink. Eating healthy foods in the right amounts, at about the same times every day, can help you:  Manage your blood glucose.  Lower your risk of heart disease.  Improve your blood pressure.  Reach or maintain a healthy weight.  What can affect my meal plan?  Every person with diabetes is different, and each person has different needs for a meal plan. Your health care provider may recommend that you work with a dietitian to make a meal plan that is best for you. Your meal plan may vary depending on factors such as:  The calories you need.  The medicines you take.  Your weight.  Your blood glucose, blood pressure, and cholesterol levels.  Your activity level.  Other health conditions you have, such as heart or kidney disease.  How do carbohydrates affect me?  Carbohydrates, also called carbs, affect your blood glucose level more than any other type of food. Eating carbs raises the amount of glucose  "in your blood.  It is important to know how many carbs you can safely have in each meal. This is different for every person. Your dietitian can help you calculate how many carbs you should have at each meal and for each snack.  How does alcohol affect me?  Alcohol can cause a decrease in blood glucose (hypoglycemia), especially if you use insulin or take certain diabetes medicines by mouth. Hypoglycemia can be a life-threatening condition. Symptoms of hypoglycemia, such as sleepiness, dizziness, and confusion, are similar to symptoms of having too much alcohol.  Do not drink alcohol if:  Your health care provider tells you not to drink.  You are pregnant, may be pregnant, or are planning to become pregnant.  If you drink alcohol:  Limit how much you have to:  0-1 drink a day for women.  0-2 drinks a day for men.  Know how much alcohol is in your drink. In the [place] one drink equals one 12 oz bottle of beer (355 mL), one 5 oz glass of wine (148 mL), or one 1½ oz glass of hard liquor (44 mL).  Keep yourself hydrated with water, diet soda, or unsweetened iced tea. Keep in mind that regular soda, juice, and other mixers may contain a lot of sugar and must be counted as carbs.  What are tips for following this plan?  Reading food labels  Start by checking the serving size on the Nutrition Facts label of packaged foods and drinks. The number of calories and the amount of carbs, fats, and other nutrients listed on the label are based on one serving of the item. Many items contain more than one serving per package.  Check the total grams (g) of carbs in one serving.  Check the number of grams of saturated fats and trans fats in one serving. Choose foods that have a low amount or none of these fats.  Check the number of milligrams (mg) of salt (sodium) in one serving. Most people should limit total sodium intake to less than 2,300 mg per day.  Always check the nutrition information of foods labeled as \"low-fat\" or \"nonfat.\" " These foods may be higher in added sugar or refined carbs and should be avoided.  Talk to your dietitian to identify your daily goals for nutrients listed on the label.  Shopping  Avoid buying canned, pre-made, or processed foods. These foods tend to be high in fat, sodium, and added sugar.  Shop around the outside edge of the grocery store. This is where you will most often find fresh fruits and vegetables, bulk grains, fresh meats, and fresh dairy products.  Cooking  Use low-heat cooking methods, such as baking, instead of high-heat cooking methods, such as deep frying.  Cook using healthy oils, such as olive, canola, or sunflower oil.  Avoid cooking with butter, cream, or high-fat meats.  Meal planning  Eat meals and snacks regularly, preferably at the same times every day. Avoid going long periods of time without eating.  Eat foods that are high in fiber, such as fresh fruits, vegetables, beans, and whole grains.  Eat 4-6 oz (112-168 g) of lean protein each day, such as lean meat, chicken, fish, eggs, or tofu. One ounce (oz) (28 g) of lean protein is equal to:  1 oz (28 g) of meat, chicken, or fish.  1 egg.  ¼ cup (62 g) of tofu.  Eat some foods each day that contain healthy fats, such as avocado, nuts, seeds, and fish.  What foods should I eat?  Fruits  Berries. Apples. Oranges. Peaches. Apricots. Plums. Grapes. Mangoes. Papayas. Pomegranates. Kiwi. Cherries.  Vegetables  Leafy greens, including lettuce, spinach, kale, chard, dada greens, mustard greens, and cabbage. Beets. Cauliflower. Broccoli. Carrots. Green beans. Tomatoes. Peppers. Onions. Cucumbers. [place] sprouts.  Grains  Whole grains, such as whole-wheat or whole-grain bread, crackers, tortillas, cereal, and pasta. Unsweetened oatmeal. Quinoa. Brown or wild rice.  Meats and other proteins  Seafood. Poultry without skin. Lean cuts of poultry and beef. Tofu. Nuts. Seeds.  Dairy  Low-fat or fat-free dairy products such as milk, yogurt, and  cheese.  The items listed above may not be a complete list of foods and beverages you can eat and drink. Contact a dietitian for more information.  What foods should I avoid?  Fruits  Fruits canned with syrup.  Vegetables  Canned vegetables. Frozen vegetables with butter or cream sauce.  Grains  Refined white flour and flour products such as bread, pasta, snack foods, and cereals. Avoid all processed foods.  Meats and other proteins  Fatty cuts of meat. Poultry with skin. Breaded or fried meats. Processed meat. Avoid saturated fats.  Dairy  Full-fat yogurt, cheese, or milk.  Beverages  Sweetened drinks, such as soda or iced tea.  The items listed above may not be a complete list of foods and beverages you should avoid. Contact a dietitian for more information.  Questions to ask a health care provider  Do I need to meet with a certified diabetes care and ?  Do I need to meet with a dietitian?  What number can I call if I have questions?  When are the best times to check my blood glucose?  Where to find more information:  American Diabetes Association: diabetes.org  Academy of Nutrition and Dietetics: eatright.org  National [place] of Diabetes and Digestive and Kidney Diseases: niddk.nih.gov  Association of Diabetes Care & Education Specialists: diabeteseducator.org  Summary  It is important to have healthy eating habits because your blood sugar (glucose) levels are greatly affected by what you eat and drink. It is important to use alcohol carefully.  A healthy meal plan will help you manage your blood glucose and lower your risk of heart disease.  Your health care provider may recommend that you work with a dietitian to make a meal plan that is best for you.  This information is not intended to replace advice given to you by your health care provider. Make sure you discuss any questions you have with your health care provider.  Document Revised: 07/21/2021 Document Reviewed: 07/21/2021  Tony  Patient Education © 2022 Elsevier Inc.

## 2024-04-02 LAB
AMORPH URATE CRY URNS QL MICRO: ABNORMAL /HPF
BACTERIA SPEC AEROBE CULT: NORMAL
BACTERIA UR QL AUTO: ABNORMAL /HPF
HYALINE CASTS UR QL AUTO: ABNORMAL /LPF
RBC # UR STRIP: ABNORMAL /HPF
REF LAB TEST METHOD: ABNORMAL
SQUAMOUS #/AREA URNS HPF: ABNORMAL /HPF
WBC # UR STRIP: ABNORMAL /HPF

## 2024-04-02 RX ORDER — ERGOCALCIFEROL 1.25 MG/1
50000 CAPSULE ORAL WEEKLY
Qty: 6 CAPSULE | Refills: 0 | Status: SHIPPED | OUTPATIENT
Start: 2024-04-02

## 2024-04-02 NOTE — TELEPHONE ENCOUNTER
It looks like whoever did the PA initially used prediabetes as the diagnosis, I have resubmitted the PA for Type 2 diabetes today. I will let you know when I get the determination back.

## 2024-04-02 NOTE — TELEPHONE ENCOUNTER
[address], [address] [facility], [address] [place] [address]  PHONE: [phone]    SUBJECTIVE:   Carin Griffin is a 54 y.o. female 1971   seen for a consultation visit regarding the following:     Chief Complaint   Patient presents with    Consultation    Chest Pain          Consultation is requested by Ramon Fragoso MD  for evaluation of Consultation and Chest Pain   . History of present illness: 54 y.o. female presented for evaluation of substernal chest discomfort patient described pain in her left lateral chest wall. She has plantar fasciitis as well as chronic knee pain. Works at [email or web address]. Patient stated she has discomfort has improved after taking treatment for reflux. Lipids reviewed from [facility] last . Total cholesterol 259. Cardiac History:  2/20/2023 EKG sinus rhythm nonspecific ST abnormalities  Assessment:  Chest pain substernal recurrent episodes. Risk factors include profoundly elevated lipid levels. She is unable to ambulate a treadmill due to plantar fasciitis and chronic knee pain. She has an abnormal ECG at baseline. Patient will be further assessed with cardiac CTA. Of note patient bradycardic at the time of exam.  She is prescribed timolol eyedrops which she takes on a regular basis. We discussed continuation of this therapy especially in days prior to CTA. Patient was given a prescription of metoprolol and instructed to bring medication with her at the time of her CT scan. Hyperlipidemia  Uncontrolled  Patient currently on statin therapy which was recently initiated  She may be a candidate for injectable therapy such as Leqvio or PCSK9 therapy in the future. Abnormal EKG      Disposition patient prefers to be notified if results are normal via MyChart or phone call.   If no abnormalities are noted on CTA patient preserved to follow-up with her primary care physician    Past Medical History, Past Surgical History, Family history, Social PA has been resubmitted through [employer] via fax, Will let you know when we get determination back.    History, and Medications were all reviewed with the patient today and updated as necessary. Allergies   Allergen Reactions    Prednisone Other (See Comments)     Stomach spasms    Tramadol Nausea And Vomiting     Past Medical History:   Diagnosis Date    Abnormal Papanicolaou smear of cervix     age 23    Fibroid, uterine     Gastrointestinal disorder     high cholesterol; acid reflux    Hypercholesterolemia     Hypertension      Past Surgical History:   Procedure Laterality Date    GYN      HYSTERECTOMY, VAGINAL  2017    still has ovaries     Family History   Problem Relation Age of Onset    Hypertension Maternal Grandmother     Breast Cancer Maternal Aunt     Diabetes Mother     Thyroid Disease Mother     Breast Cancer Maternal Grandmother     Thyroid Disease Maternal Aunt     Diabetes Maternal Aunt      Social History     Tobacco Use    Smoking status: Never    Smokeless tobacco: Never   Substance Use Topics    Alcohol use: No       ROS:    Review of Systems   Constitutional: Negative for fever. HENT:  Negative for stridor. Eyes:  Negative for pain. Cardiovascular:  Positive for chest pain. Respiratory:  Negative for cough. Endocrine: Negative for cold intolerance. Skin:  Negative for nail changes. Musculoskeletal:  Negative for arthritis. Gastrointestinal:  Negative for abdominal pain. Genitourinary:  Negative for dysuria. Neurological:  Negative for aphonia. Psychiatric/Behavioral:  Negative for altered mental status. Allergic/Immunologic: Negative for hives. PHYSICAL EXAM:   /84   Pulse (!) 6   Ht 5' 5\" (1.651 m)   Wt 163 lb 9.6 oz (74.2 kg)   BMI 27.22 kg/m²      Physical Exam  Vitals reviewed. HENT:      Head: Normocephalic. Right Ear: External ear normal.      Left Ear: External ear normal.      Nose: Nose normal.   Eyes:      General: No scleral icterus.   Pulmonary:      Effort: Pulmonary effort is normal.   Abdominal:      General: There is no distension. Musculoskeletal:      Cervical back: Neck supple. Skin:     General: Skin is warm. Neurological:      Mental Status: She is alert. Mental status is at baseline. Medical problems and test results were reviewed with the patient today. No results found for any visits on 02/20/23. No results found for this or any previous visit (from the past 672 hour(s)). No results found for: CHOL, CHOLPOCT, CHOLX, CHLST, CHOLV, HDL, HDLPOC, HDLC, LDL, LDLC, VLDLC, VLDL, TGLX, TRIGL    No results found for any visits on 02/20/23. Leon Raines was seen today for consultation and chest pain. Diagnoses and all orders for this visit:    Establishing care with new doctor, encounter for  -     EKG 12 Lead    Substernal chest pain  -     EKG 12 Lead  -     CTA CORON EJECT FRAC WALL MOTION; Future    Other orders  -     metoprolol tartrate (LOPRESSOR) 25 MG tablet; Bring  medication with you for CT scan. Return in about 6 weeks (around 4/3/2023). Thank you for allowing me to participate in this patient's care. Please call or contact me if there are any questions or concerns regarding the above.       Heidi Jackson MD  02/20/23  4:11 PM      Proofread, but unrecognized errors may exist.

## 2024-04-03 ENCOUNTER — TELEPHONE (OUTPATIENT)
Dept: INTERNAL MEDICINE | Facility: CLINIC | Age: 44
End: 2024-04-03
Payer: COMMERCIAL

## 2024-04-03 DIAGNOSIS — E53.8 B12 DEFICIENCY: Primary | ICD-10-CM

## 2024-04-03 RX ORDER — CYANOCOBALAMIN 1000 UG/ML
1000 INJECTION, SOLUTION INTRAMUSCULAR; SUBCUTANEOUS WEEKLY
Status: SHIPPED | OUTPATIENT
Start: 2024-04-03 | End: 2024-05-29

## 2024-04-03 NOTE — TELEPHONE ENCOUNTER
----- Message from Virgil Barkley MD sent at 4/2/2024  2:43 PM EDT -----  Sent.  Thanks.  ----- Message -----  From: Mayra Pyle  Sent: 4/2/2024   2:42 PM EDT  To: Virgil Barkley MD    Pt notified of message. Good understanding verbalized. Pt stated she will be in office to leave urine sample after a couple days of taking doxycycline. Please place order as future.

## 2024-04-03 NOTE — TELEPHONE ENCOUNTER
We can complete those in the office if needed.  She can get a nurse visit if she is interested.  Thanks.

## 2024-04-03 NOTE — TELEPHONE ENCOUNTER
Patient stated that pharmacy told her the B12 tablets are on back order and she was wondering if you could order her to have the b12 injections in office.

## 2024-04-04 LAB
A VAGINAE DNA VAG QL NAA+PROBE: ABNORMAL SCORE
BVAB2 DNA VAG QL NAA+PROBE: ABNORMAL SCORE
C ALBICANS DNA VAG QL NAA+PROBE: NEGATIVE
C GLABRATA DNA VAG QL NAA+PROBE: NEGATIVE
C KRUSEI DNA VAG QL NAA+PROBE: NEGATIVE
C LUSITANIAE DNA VAG QL NAA+PROBE: NEGATIVE
C TRACH DNA VAG QL NAA+PROBE: NEGATIVE
CANDIDA DNA VAG QL NAA+PROBE: NEGATIVE
MEGA1 DNA VAG QL NAA+PROBE: ABNORMAL SCORE
N GONORRHOEA DNA VAG QL NAA+PROBE: NEGATIVE
T VAGINALIS DNA VAG QL NAA+PROBE: NEGATIVE

## 2024-04-08 ENCOUNTER — CLINICAL SUPPORT (OUTPATIENT)
Dept: INTERNAL MEDICINE | Facility: CLINIC | Age: 44
End: 2024-04-08
Payer: COMMERCIAL

## 2024-04-08 DIAGNOSIS — E53.8 B12 DEFICIENCY: Primary | ICD-10-CM

## 2024-04-08 PROCEDURE — 96372 THER/PROPH/DIAG INJ SC/IM: CPT | Performed by: STUDENT IN AN ORGANIZED HEALTH CARE EDUCATION/TRAINING PROGRAM

## 2024-04-08 RX ORDER — METRONIDAZOLE 500 MG/1
500 TABLET ORAL 2 TIMES DAILY
Qty: 14 TABLET | Refills: 0 | Status: SHIPPED | OUTPATIENT
Start: 2024-04-08 | End: 2024-04-15

## 2024-04-08 RX ADMIN — CYANOCOBALAMIN 1000 MCG: 1000 INJECTION, SOLUTION INTRAMUSCULAR; SUBCUTANEOUS at 14:30

## 2024-04-10 ENCOUNTER — CLINICAL SUPPORT (OUTPATIENT)
Dept: INTERNAL MEDICINE | Facility: CLINIC | Age: 44
End: 2024-04-10
Payer: COMMERCIAL

## 2024-04-10 DIAGNOSIS — E53.8 B12 DEFICIENCY: Primary | ICD-10-CM

## 2024-04-10 PROCEDURE — 96372 THER/PROPH/DIAG INJ SC/IM: CPT | Performed by: STUDENT IN AN ORGANIZED HEALTH CARE EDUCATION/TRAINING PROGRAM

## 2024-04-10 RX ADMIN — CYANOCOBALAMIN 1000 MCG: 1000 INJECTION, SOLUTION INTRAMUSCULAR; SUBCUTANEOUS at 14:12

## 2024-04-12 DIAGNOSIS — B37.31 VULVOVAGINAL CANDIDIASIS: Primary | ICD-10-CM

## 2024-04-12 RX ORDER — FLUCONAZOLE 150 MG/1
150 TABLET ORAL ONCE
Qty: 2 TABLET | Refills: 0 | Status: SHIPPED | OUTPATIENT
Start: 2024-04-12 | End: 2024-04-12

## 2024-04-17 ENCOUNTER — CLINICAL SUPPORT (OUTPATIENT)
Dept: INTERNAL MEDICINE | Facility: CLINIC | Age: 44
End: 2024-04-17
Payer: COMMERCIAL

## 2024-04-17 DIAGNOSIS — E53.8 B12 DEFICIENCY: Primary | ICD-10-CM

## 2024-04-17 PROCEDURE — 96372 THER/PROPH/DIAG INJ SC/IM: CPT | Performed by: STUDENT IN AN ORGANIZED HEALTH CARE EDUCATION/TRAINING PROGRAM

## 2024-04-17 RX ADMIN — CYANOCOBALAMIN 1000 MCG: 1000 INJECTION, SOLUTION INTRAMUSCULAR; SUBCUTANEOUS at 14:36

## 2024-04-26 ENCOUNTER — CLINICAL SUPPORT (OUTPATIENT)
Dept: INTERNAL MEDICINE | Facility: CLINIC | Age: 44
End: 2024-04-26
Payer: COMMERCIAL

## 2024-04-26 DIAGNOSIS — E53.8 B12 DEFICIENCY: Primary | ICD-10-CM

## 2024-04-26 PROCEDURE — 96372 THER/PROPH/DIAG INJ SC/IM: CPT | Performed by: STUDENT IN AN ORGANIZED HEALTH CARE EDUCATION/TRAINING PROGRAM

## 2024-04-26 RX ADMIN — CYANOCOBALAMIN 1000 MCG: 1000 INJECTION, SOLUTION INTRAMUSCULAR; SUBCUTANEOUS at 14:50

## 2024-05-10 ENCOUNTER — CLINICAL SUPPORT (OUTPATIENT)
Dept: INTERNAL MEDICINE | Facility: CLINIC | Age: 44
End: 2024-05-10
Payer: COMMERCIAL

## 2024-05-10 PROCEDURE — 96372 THER/PROPH/DIAG INJ SC/IM: CPT | Performed by: STUDENT IN AN ORGANIZED HEALTH CARE EDUCATION/TRAINING PROGRAM

## 2024-05-10 RX ADMIN — CYANOCOBALAMIN 1000 MCG: 1000 INJECTION, SOLUTION INTRAMUSCULAR; SUBCUTANEOUS at 14:05

## 2024-05-15 DIAGNOSIS — L73.2 HIDRADENITIS SUPPURATIVA: ICD-10-CM

## 2024-05-15 DIAGNOSIS — N76.0 BACTERIAL VAGINOSIS: ICD-10-CM

## 2024-05-15 DIAGNOSIS — B96.89 BACTERIAL VAGINOSIS: ICD-10-CM

## 2024-05-15 RX ORDER — DOXYCYCLINE HYCLATE 100 MG/1
100 CAPSULE ORAL 2 TIMES DAILY
Qty: 180 CAPSULE | Refills: 0 | OUTPATIENT
Start: 2024-05-15

## 2024-05-15 RX ORDER — METRONIDAZOLE 500 MG/1
500 TABLET ORAL 2 TIMES DAILY
Qty: 14 TABLET | Refills: 0 | OUTPATIENT
Start: 2024-05-15 | End: 2024-05-22

## 2024-05-17 ENCOUNTER — OFFICE VISIT (OUTPATIENT)
Dept: INTERNAL MEDICINE | Facility: CLINIC | Age: 44
End: 2024-05-17
Payer: COMMERCIAL

## 2024-05-17 VITALS
TEMPERATURE: 97.3 F | SYSTOLIC BLOOD PRESSURE: 118 MMHG | BODY MASS INDEX: 35.3 KG/M2 | DIASTOLIC BLOOD PRESSURE: 84 MMHG | RESPIRATION RATE: 18 BRPM | WEIGHT: 225.4 LBS

## 2024-05-17 DIAGNOSIS — R10.2 PELVIC PAIN: ICD-10-CM

## 2024-05-17 DIAGNOSIS — N30.90 CYSTITIS: ICD-10-CM

## 2024-05-17 DIAGNOSIS — F17.200 TOBACCO USE DISORDER: ICD-10-CM

## 2024-05-17 DIAGNOSIS — E11.65 TYPE 2 DIABETES MELLITUS WITH HYPERGLYCEMIA, WITHOUT LONG-TERM CURRENT USE OF INSULIN: Primary | ICD-10-CM

## 2024-05-17 DIAGNOSIS — K21.00 GASTROESOPHAGEAL REFLUX DISEASE WITH ESOPHAGITIS WITHOUT HEMORRHAGE: ICD-10-CM

## 2024-05-17 PROBLEM — R31.1 BENIGN ESSENTIAL MICROSCOPIC HEMATURIA: Status: RESOLVED | Noted: 2023-04-12 | Resolved: 2024-05-17

## 2024-05-17 PROBLEM — L02.91 ABSCESS: Status: RESOLVED | Noted: 2023-04-12 | Resolved: 2024-05-17

## 2024-05-17 LAB
BILIRUB UR QL STRIP: NEGATIVE
CLARITY UR: CLEAR
COLOR UR: YELLOW
EXPIRATION DATE: NORMAL
GLUCOSE UR STRIP-MCNC: NEGATIVE MG/DL
HBA1C MFR BLD: 5.4 % (ref 4.5–5.7)
HGB UR QL STRIP.AUTO: NEGATIVE
KETONES UR QL STRIP: NEGATIVE
LEUKOCYTE ESTERASE UR QL STRIP.AUTO: NEGATIVE
Lab: NORMAL
NITRITE UR QL STRIP: NEGATIVE
PH UR STRIP.AUTO: 6 [PH] (ref 5–8)
PROT UR QL STRIP: NEGATIVE
SP GR UR STRIP: 1.01 (ref 1–1.03)
UROBILINOGEN UR QL STRIP: NORMAL

## 2024-05-17 PROCEDURE — 87801 DETECT AGNT MULT DNA AMPLI: CPT | Performed by: STUDENT IN AN ORGANIZED HEALTH CARE EDUCATION/TRAINING PROGRAM

## 2024-05-17 PROCEDURE — 87591 N.GONORRHOEAE DNA AMP PROB: CPT | Performed by: STUDENT IN AN ORGANIZED HEALTH CARE EDUCATION/TRAINING PROGRAM

## 2024-05-17 PROCEDURE — 87661 TRICHOMONAS VAGINALIS AMPLIF: CPT | Performed by: STUDENT IN AN ORGANIZED HEALTH CARE EDUCATION/TRAINING PROGRAM

## 2024-05-17 PROCEDURE — 87086 URINE CULTURE/COLONY COUNT: CPT | Performed by: STUDENT IN AN ORGANIZED HEALTH CARE EDUCATION/TRAINING PROGRAM

## 2024-05-17 PROCEDURE — 82043 UR ALBUMIN QUANTITATIVE: CPT | Performed by: STUDENT IN AN ORGANIZED HEALTH CARE EDUCATION/TRAINING PROGRAM

## 2024-05-17 PROCEDURE — 87491 CHLMYD TRACH DNA AMP PROBE: CPT | Performed by: STUDENT IN AN ORGANIZED HEALTH CARE EDUCATION/TRAINING PROGRAM

## 2024-05-17 PROCEDURE — 81003 URINALYSIS AUTO W/O SCOPE: CPT | Performed by: STUDENT IN AN ORGANIZED HEALTH CARE EDUCATION/TRAINING PROGRAM

## 2024-05-17 PROCEDURE — 82570 ASSAY OF URINE CREATININE: CPT | Performed by: STUDENT IN AN ORGANIZED HEALTH CARE EDUCATION/TRAINING PROGRAM

## 2024-05-17 PROCEDURE — 87798 DETECT AGENT NOS DNA AMP: CPT | Performed by: STUDENT IN AN ORGANIZED HEALTH CARE EDUCATION/TRAINING PROGRAM

## 2024-05-17 RX ORDER — OMEPRAZOLE 40 MG/1
40 CAPSULE, DELAYED RELEASE ORAL DAILY
Qty: 30 CAPSULE | Refills: 1 | Status: SHIPPED | OUTPATIENT
Start: 2024-05-17

## 2024-05-17 RX ORDER — BUPROPION HYDROCHLORIDE 150 MG/1
150 TABLET ORAL EVERY MORNING
Qty: 30 TABLET | Refills: 1 | Status: SHIPPED | OUTPATIENT
Start: 2024-05-17

## 2024-05-17 RX ORDER — FLUCONAZOLE 150 MG/1
TABLET ORAL
COMMUNITY
Start: 2024-04-12 | End: 2024-05-17

## 2024-05-17 RX ORDER — NICOTINE 21 MG/24HR
1 PATCH, TRANSDERMAL 24 HOURS TRANSDERMAL EVERY 24 HOURS
Qty: 28 EACH | Refills: 3 | Status: SHIPPED | OUTPATIENT
Start: 2024-05-17

## 2024-05-17 RX ADMIN — CYANOCOBALAMIN 1000 MCG: 1000 INJECTION, SOLUTION INTRAMUSCULAR; SUBCUTANEOUS at 12:33

## 2024-05-17 NOTE — ASSESSMENT & PLAN NOTE
Carlos Heller  reports that she has been smoking cigarettes. She started smoking about 22 years ago. She has a 10 pack-year smoking history. She has never used smokeless tobacco. I have educated her on the risk of diseases from using tobacco products such as cancer, COPD, and heart disease.     I advised her to quit and she is willing to quit. We have discussed the following method/s for tobacco cessation:  Counseling Prescription Medicaiton.  Together we have set a quit date for 1 month from today.  She will follow up with me in 3 months or sooner to check on her progress.    I spent 5 minutes counseling the patient.

## 2024-05-17 NOTE — PROGRESS NOTES
Acute Office Visit      Date: 2024   Patient Name: Carlos Heller  : 1980   MRN: [id number]     Chief Complaint:    Chief Complaint   Patient presents with    Medication Problem       History of Present Illness: Carlos Heller is a 43 y.o. female.    History of Present Illness  The patient presents for evaluation of multiple medical concerns.    The patient expresses a desire to proceed with bariatric surgery, having previously communicated with the bariatric team via phone. Despite the administration of injections, her weight remains stable. She is currently on a regimen of Ozempic, initially at a dosage of 0.2 mg, with a plan to increase to 0.5 mg. She reports experiencing nausea when a higher dosage of Ozempic is increased. However, she expresses a preference against increasing the dosage due to its lack of appetite coverage.    The patient has been on heartburn medication for over a year and is seeking a refill. She was previously on omeprazole for GERD, but discontinued it due to perceived cardiac side effects. Currently, she is taking Pepcid and consumes approximately three bottles of Tums per week. Despite trying various medications for GERD, she reports no relief. She also experiences nocturnal nausea and a burning sensation.    The patient is uncertain about the necessity of continuing metformin.    The patient believes her bacterial infection has not resolved due to incomplete course of doxycycline. She reports severe gastrointestinal upset from Diflucan. She is planning a vacation in 6 days. She has not undergone a transvaginal ultrasound due to pelvic pain.    The patient is attempting to cease smoking and is inquiring about the use of nicotine patches and vaping. She estimates her previous use of 21 mg of nicotine patches when she ceased smoking. She is unable to take Chantix.    Supplemental Information  She still has blood in her urine, but she has not had it for  over a year.        Subjective      Review of Systems:   Review of Systems   All other systems reviewed and are negative.      I have reviewed the patients family history, social history, past medical history, past surgical history and have updated it as appropriate.     Medications:     Current Outpatient Medications:     mupirocin (BACTROBAN) 2 % ointment, Apply 1 application topically to the appropriate area as directed 3 (Three) Times a Day., Disp: 15 g, Rfl: 3    Semaglutide,0.25 or 0.5MG/DOS, (OZEMPIC) 2 MG/3ML solution pen-injector, Inject 0.5 mg under the skin into the appropriate area as directed 1 (One) Time Per Week., Disp: 3 mL, Rfl: 0    traZODone (DESYREL) 100 MG tablet, Take 1 tablet by mouth Every Night., Disp: 60 tablet, Rfl: 1    vitamin D (ERGOCALCIFEROL) 1.25 MG (26509 UT) capsule capsule, Take 1 capsule by mouth 1 (One) Time Per Week., Disp: 6 capsule, Rfl: 0    buPROPion XL (WELLBUTRIN XL) 150 MG 24 hr tablet, Take 1 tablet by mouth Every Morning., Disp: 30 tablet, Rfl: 1    nicotine (NICODERM CQ) 21 MG/24HR patch, Place 1 patch on the skin as directed by provider Daily., Disp: 28 each, Rfl: 3    omeprazole (priLOSEC) 40 MG capsule, Take 1 capsule by mouth Daily., Disp: 30 capsule, Rfl: 1    Current Facility-Administered Medications:     cyanocobalamin injection 1,000 mcg, 1,000 mcg, Intramuscular, Weekly, Virgil Barkley MD, 1,000 mcg at 05/17/24 1233    Allergies:   Allergies   Allergen Reactions    Influenza Vaccines GI Intolerance     Other reaction(s): GI Intolerance    Sulfamethoxazole-Trimethoprim Unknown - Low Severity    Doxycycline GI Intolerance    Wellbutrin [Bupropion] Palpitations       Objective     Physical Exam: Please see above  Vital Signs:   Vitals:    05/17/24 1146   BP: 118/84   BP [facility]: Right arm   Patient Position: Sitting   Cuff Size: Large Adult   Resp: 18   Temp: 97.3 °F (36.3 °C)   TempSrc: Temporal   Weight: 102 kg (225 lb 6.4 oz)   PainSc:   2     Body mass  index is 35.3 kg/m².    Physical Exam  Vitals and nursing note reviewed.   Constitutional:       General: She is not in acute distress.     Appearance: Normal appearance. She is obese. She is not ill-appearing or toxic-appearing.   HENT:      Nose: No congestion or rhinorrhea.   Eyes:      General:         Right eye: No discharge.         Left eye: No discharge.      Conjunctiva/sclera: Conjunctivae normal.   Pulmonary:      Effort: Pulmonary effort is normal. No respiratory distress.   Abdominal:      General: Abdomen is flat. There is no distension.   Musculoskeletal:      Cervical back: Normal range of motion.   Skin:     Coloration: Skin is not jaundiced.      Findings: No rash.   Neurological:      General: No focal deficit present.      Mental Status: She is alert. Mental status is at baseline.      Coordination: Coordination normal.      Gait: Gait normal.   Psychiatric:         Mood and Affect: Mood normal.         Behavior: Behavior normal.         Thought Content: Thought content normal.         Judgment: Judgment normal.         Procedures    Results:   Labs:   Hemoglobin A1C   Date Value Ref Range Status   01/25/2024 5.3 4.5 - 5.7 % Final   07/17/2018 8.10 (H) 4.80 - 5.60 % Final     TSH   Date Value Ref Range Status   03/29/2023 1.190 0.450 - 4.500 uIU/mL Final        Imaging:   No valid procedures specified.     Assessment / Plan      Assessment/Plan:   Problem List Items Addressed This Visit       Type 2 diabetes mellitus with hyperglycemia, without long-term current use of insulin - Primary    Overview     Previously had worsened GI symptoms with ozempic         Relevant Medications    Semaglutide,0.25 or 0.5MG/DOS, (OZEMPIC) 2 MG/3ML solution pen-injector    Other Relevant Orders    POC Glycosylated Hemoglobin (Hb A1C)    Microalbumin / Creatinine Urine Ratio - Urine, Clean Catch    Tobacco use disorder    Current Assessment & Plan     Carlos Heller  reports that she has been smoking  cigarettes. She started smoking about 22 years ago. She has a 10 pack-year smoking history. She has never used smokeless tobacco. I have educated her on the risk of diseases from using tobacco products such as cancer, COPD, and heart disease.     I advised her to quit and she is willing to quit. We have discussed the following method/s for tobacco cessation:  Counseling Prescription Medicaiton.  Together we have set a quit date for 1 month from today.  She will follow up with me in 3 months or sooner to check on her progress.    I spent 5 minutes counseling the patient.                Relevant Medications    nicotine (NICODERM CQ) 21 MG/24HR patch    buPROPion XL (WELLBUTRIN XL) 150 MG 24 hr tablet    Gastroesophageal reflux disease with esophagitis without hemorrhage    Relevant Medications    omeprazole (priLOSEC) 40 MG capsule    Pelvic pain    Relevant Orders    NuSwab VG+, Candida 6sp       Assessment & Plan  1.  Diabetes mellitus type 2  Class II obesity  The patient has experienced a significant weight reduction from 235 pounds to 225 pounds. The dosage of Ozempic will be escalated to 0.5 mg, and a refill will be provided. The use of metformin will be discontinued. Should the patient experience adverse effects from increased Ozempic dosage, the dosage will be reduced to 0.25 mg, and the possibility of bariatric surgery will be explored.    2. Gastroesophageal Reflux Disease (GERD).  The patient's medication will be switched to a higher potency omeprazole, and the use of famotidine will be discontinued.    3. Pelvic pain.  The presence of blood in her urine has resolved. An active order for a transvaginal ultrasound has been placed. A self-swab will be conducted today. Should the patient continue to experience pelvic pain, a transvaginal ultrasound may be considered. If the bacteria or yeast remain present, a treatment other than doxycycline will be initiated.    4. Tobacco use.  A prescription for 21 mg  nicotine patches will be provided. Bupropion will be prescribed, to be taken early in the morning.    5. Vitamin B12 deficiency.  The patient will receive a B12 injection today.    Follow-up  The patient is scheduled for a follow-up visit in 3 months, or sooner if necessary.    Results  Laboratory Studies  A1c is 5.3%.       Follow Up:   Return in about 3 months (around 8/17/2024) for Recheck.    Patient or patient representative verbalized consent for the use of Ambient Listening during the visit with  Virgil Barkley MD for chart documentation. 5/17/2024  12:40 EDT        Virgil Barkley MD  [facility] TERI Davis

## 2024-05-18 LAB
ALBUMIN UR-MCNC: <1.2 MG/DL
CREAT UR-MCNC: 102.8 MG/DL
MICROALBUMIN/CREAT UR: NORMAL MG/G{CREAT}

## 2024-05-19 LAB — BACTERIA SPEC AEROBE CULT: NO GROWTH

## 2024-05-20 LAB
A VAGINAE DNA VAG QL NAA+PROBE: NORMAL SCORE
BVAB2 DNA VAG QL NAA+PROBE: NORMAL SCORE
C ALBICANS DNA VAG QL NAA+PROBE: NEGATIVE
C GLABRATA DNA VAG QL NAA+PROBE: NEGATIVE
C KRUSEI DNA VAG QL NAA+PROBE: NEGATIVE
C LUSITANIAE DNA VAG QL NAA+PROBE: NEGATIVE
C TRACH DNA VAG QL NAA+PROBE: NEGATIVE
CANDIDA DNA VAG QL NAA+PROBE: NEGATIVE
MEGA1 DNA VAG QL NAA+PROBE: NORMAL SCORE
N GONORRHOEA DNA VAG QL NAA+PROBE: NEGATIVE
T VAGINALIS DNA VAG QL NAA+PROBE: NEGATIVE

## 2024-05-24 ENCOUNTER — CLINICAL SUPPORT (OUTPATIENT)
Dept: INTERNAL MEDICINE | Facility: CLINIC | Age: 44
End: 2024-05-24
Payer: COMMERCIAL

## 2024-05-24 PROCEDURE — 96372 THER/PROPH/DIAG INJ SC/IM: CPT | Performed by: STUDENT IN AN ORGANIZED HEALTH CARE EDUCATION/TRAINING PROGRAM

## 2024-05-24 RX ADMIN — CYANOCOBALAMIN 1000 MCG: 1000 INJECTION, SOLUTION INTRAMUSCULAR; SUBCUTANEOUS at 14:20

## 2024-05-27 DIAGNOSIS — G47.00 INSOMNIA, UNSPECIFIED TYPE: ICD-10-CM

## 2024-05-28 NOTE — TELEPHONE ENCOUNTER
LOV 05/17/2024  NOV     Tried to reach patient no answer left voicemail to return call    RELAY:  We recently received your message to refill your medication(s).  Our records indicate that you did not schedule a follow up appointment with your provider at your last visit on 05/17/2024. The medication that you are on requires a follow up appointment for additional refills. Patient was to schedule on or around 08/17/2024 for 3 month follow up    If she is unable to keep her appointment we will not be able to provider further refills.  Once appointment has been scheduled please update message with date and time so we can process the request.  We will forward the message to the provider to review the refill request.

## 2024-05-29 NOTE — TELEPHONE ENCOUNTER
2nd attempt     LOV 05/17/2024  NOV      Tried to reach patient no answer left voicemail to return call     RELAY:  We recently received your message to refill your medication(s).  Our records indicate that you did not schedule a follow up appointment with your provider at your last visit on 05/17/2024. The medication that you are on requires a follow up appointment for additional refills. Patient was to schedule on or around 08/17/2024 for 3 month follow up     If she is unable to keep her appointment we will not be able to provider further refills.  Once appointment has been scheduled please update message with date and time so we can process the request.  We will forward the message to the provider to review the refill request.

## 2024-05-30 RX ORDER — TRAZODONE HYDROCHLORIDE 100 MG/1
100 TABLET ORAL
Qty: 60 TABLET | Refills: 1 | Status: SHIPPED | OUTPATIENT
Start: 2024-05-30

## 2024-05-30 NOTE — TELEPHONE ENCOUNTER
3rd attempt      LOV 05/17/2024  NOV      Tried to reach patient no answer left voicemail to return call     RELAY:  We recently received your message to refill your medication(s).  Our records indicate that you did not schedule a follow up appointment with your provider at your last visit on 05/17/2024. The medication that you are on requires a follow up appointment for additional refills. Patient was to schedule on or around 08/17/2024 for 3 month follow up     If she is unable to keep her appointment we will not be able to provider further refills.  Once appointment has been scheduled please update message with date and time so we can process the request.  We will forward the message to the provider to review the refill request.

## 2024-06-30 DIAGNOSIS — L73.2 HIDRADENITIS SUPPURATIVA: ICD-10-CM

## 2024-06-30 DIAGNOSIS — E11.65 TYPE 2 DIABETES MELLITUS WITH HYPERGLYCEMIA, WITHOUT LONG-TERM CURRENT USE OF INSULIN: ICD-10-CM

## 2024-07-06 RX ORDER — METFORMIN HYDROCHLORIDE 500 MG/1
500 TABLET, EXTENDED RELEASE ORAL
Qty: 90 TABLET | Refills: 1 | OUTPATIENT
Start: 2024-07-06

## 2024-07-24 ENCOUNTER — OFFICE VISIT (OUTPATIENT)
Dept: INTERNAL MEDICINE | Facility: CLINIC | Age: 44
End: 2024-07-24
Payer: COMMERCIAL

## 2024-07-24 VITALS
RESPIRATION RATE: 18 BRPM | DIASTOLIC BLOOD PRESSURE: 60 MMHG | SYSTOLIC BLOOD PRESSURE: 110 MMHG | HEIGHT: 67 IN | HEART RATE: 72 BPM | WEIGHT: 237 LBS | TEMPERATURE: 97.5 F | BODY MASS INDEX: 37.2 KG/M2

## 2024-07-24 DIAGNOSIS — K21.9 GASTROESOPHAGEAL REFLUX DISEASE, UNSPECIFIED WHETHER ESOPHAGITIS PRESENT: ICD-10-CM

## 2024-07-24 DIAGNOSIS — R10.30 LOWER ABDOMINAL PAIN: Primary | ICD-10-CM

## 2024-07-24 DIAGNOSIS — E53.8 B12 DEFICIENCY: ICD-10-CM

## 2024-07-24 DIAGNOSIS — E11.65 TYPE 2 DIABETES MELLITUS WITH HYPERGLYCEMIA, WITHOUT LONG-TERM CURRENT USE OF INSULIN: ICD-10-CM

## 2024-07-24 DIAGNOSIS — J40 BRONCHITIS: ICD-10-CM

## 2024-07-24 DIAGNOSIS — K59.00 CONSTIPATION, UNSPECIFIED CONSTIPATION TYPE: ICD-10-CM

## 2024-07-24 LAB
BILIRUB BLD-MCNC: NEGATIVE MG/DL
CLARITY, POC: ABNORMAL
COLOR UR: YELLOW
EXPIRATION DATE: ABNORMAL
GLUCOSE UR STRIP-MCNC: NEGATIVE MG/DL
KETONES UR QL: NEGATIVE
LEUKOCYTE EST, POC: NEGATIVE
Lab: ABNORMAL
NITRITE UR-MCNC: NEGATIVE MG/ML
PH UR: 7 [PH] (ref 5–8)
PROT UR STRIP-MCNC: NEGATIVE MG/DL
RBC # UR STRIP: ABNORMAL /UL
SP GR UR: 1 (ref 1–1.03)
UROBILINOGEN UR QL: NORMAL

## 2024-07-24 PROCEDURE — 99214 OFFICE O/P EST MOD 30 MIN: CPT | Performed by: NURSE PRACTITIONER

## 2024-07-24 PROCEDURE — 96372 THER/PROPH/DIAG INJ SC/IM: CPT | Performed by: NURSE PRACTITIONER

## 2024-07-24 PROCEDURE — 87086 URINE CULTURE/COLONY COUNT: CPT | Performed by: NURSE PRACTITIONER

## 2024-07-24 PROCEDURE — 81003 URINALYSIS AUTO W/O SCOPE: CPT | Performed by: NURSE PRACTITIONER

## 2024-07-24 RX ORDER — CEFDINIR 300 MG/1
300 CAPSULE ORAL 2 TIMES DAILY
Qty: 20 CAPSULE | Refills: 0 | Status: SHIPPED | OUTPATIENT
Start: 2024-07-24

## 2024-07-24 RX ORDER — CYANOCOBALAMIN 1000 UG/ML
1000 INJECTION, SOLUTION INTRAMUSCULAR; SUBCUTANEOUS
Status: SHIPPED | OUTPATIENT
Start: 2024-07-24

## 2024-07-24 RX ORDER — ONDANSETRON 8 MG/1
8 TABLET, ORALLY DISINTEGRATING ORAL EVERY 8 HOURS PRN
Qty: 20 TABLET | Refills: 0 | Status: SHIPPED | OUTPATIENT
Start: 2024-07-24

## 2024-07-24 RX ORDER — FAMOTIDINE 20 MG/1
40 TABLET, FILM COATED ORAL 2 TIMES DAILY
Qty: 30 TABLET | Refills: 2 | Status: SHIPPED | OUTPATIENT
Start: 2024-07-24

## 2024-07-24 RX ADMIN — CYANOCOBALAMIN 1000 MCG: 1000 INJECTION, SOLUTION INTRAMUSCULAR; SUBCUTANEOUS at 11:52

## 2024-07-24 NOTE — PROGRESS NOTES
"Chief Complaint  Headache, Cough (X1 month . ), Pain (Lower abdomen), and Dizziness (X1 month. )    Subjective          Carlos Heller presents to [facility]  History of Present Illness  History of Present Illness  Takes ozempic but does not take as ordered due to N so she takes qoweek needs wants up dose even with N    Sinus pnd dry cough one month no fever mucinex not heping. Has HA taking tylenol      She has urine odor as well as lower abdominal pain.      Has reflux but cannot tolerate omeprazole due to side effects.  She would like to try taking Pepcid 40 mg.      She has had constipation not on current medication to treat constipation but reports.  Juice is helping with bowel movements.            Objective   Vital Signs:   /60 (BP [facility]: Right arm, Patient Position: Sitting, Cuff Size: Adult)   Pulse 72   Temp 97.5 °F (36.4 °C) (Infrared)   Resp 18   Ht 170.2 cm (67\")   Wt 108 kg (237 lb)   BMI 37.12 kg/m²     Physical Exam  Vitals and nursing note reviewed.   Constitutional:       General: She is not in acute distress.     Appearance: Normal appearance. She is well-developed. She is not ill-appearing.   HENT:      Head: Normocephalic and atraumatic.      Ears:      Comments: Bilateral TMs are dull     Nose: Congestion present.      Mouth/Throat:      Mouth: Mucous membranes are moist.      Pharynx: Oropharynx is clear.   Eyes:      General: No scleral icterus.  Neck:      Thyroid: No thyromegaly.   Cardiovascular:      Rate and Rhythm: Normal rate and regular rhythm.   Pulmonary:      Effort: Pulmonary effort is normal.      Breath sounds: Normal breath sounds.   Abdominal:      General: Bowel sounds are normal. There is no distension.      Palpations: Abdomen is soft.      Tenderness: There is no abdominal tenderness.   Lymphadenopathy:      Cervical: No cervical adenopathy.   Skin:     Capillary Refill: Capillary refill takes 2 to 3 " seconds.      Coloration: Skin is not pale.   Neurological:      Mental Status: She is alert and oriented to person, place, and time.   Psychiatric:         Mood and Affect: Mood normal.         Behavior: Behavior normal.        Result Review :                 Assessment and Plan    Diagnoses and all orders for this visit:    1. Lower abdominal pain (Primary)  -     XR Abdomen KUB; Future  -     Urine Culture - Urine, Urine, Random Void; Future  -     POC Urinalysis Dipstick, Automated; Future  -     Urine Culture - Urine, Urine, Random Void  -     POC Urinalysis Dipstick, Automated    2. Type 2 diabetes mellitus with hyperglycemia, without long-term current use of insulin  -     Semaglutide,0.25 or 0.5MG/DOS, (OZEMPIC) 2 MG/3ML solution pen-injector; Inject 0.5 mg under the skin into the appropriate area as directed 1 (One) Time Per Week.  Dispense: 3 mL; Refill: 0    3. Constipation, unspecified constipation type    4. Gastroesophageal reflux disease, unspecified whether esophagitis present    5. Bronchitis    6. B12 deficiency  -     cyanocobalamin injection 1,000 mcg    Other orders  -     ondansetron ODT (ZOFRAN-ODT) 8 MG disintegrating tablet; Place 1 tablet on the tongue Every 8 (Eight) Hours As Needed for Nausea or Vomiting.  Dispense: 20 tablet; Refill: 0  -     cefdinir (OMNICEF) 300 MG capsule; Take 1 capsule by mouth 2 (Two) Times a Day.  Dispense: 20 capsule; Refill: 0  -     famotidine (Pepcid) 20 MG tablet; Take 2 tablets by mouth 2 (Two) Times a Day.  Dispense: 30 tablet; Refill: 2    Will assess urinalysis for UTI will start on Omnicef which would cover upper respiratory and UTI.  Will follow-up with urine is made available.  Long discussion in regards to risk and benefits of Ozempic use and importance to treat constipation.  Patient verbalized understanding and constipation recurs she will let us know.    Follow Up   Return if symptoms worsen or fail to improve, for 0/1/24 pascual f/u   Filippo.  Patient was given instructions and counseling regarding her condition or for health maintenance advice. Please see specific information pulled into the AVS if appropriate.     RTC/call  If symptoms worsen  Meds DOT and 's reviewed and pt v/u    07/24/24   11:20 EDT

## 2024-07-25 LAB — BACTERIA SPEC AEROBE CULT: NORMAL

## 2024-07-28 DIAGNOSIS — R31.9 HEMATURIA, UNSPECIFIED TYPE: Primary | ICD-10-CM

## 2024-07-30 ENCOUNTER — OFFICE VISIT (OUTPATIENT)
Dept: INTERNAL MEDICINE | Facility: CLINIC | Age: 44
End: 2024-07-30
Payer: COMMERCIAL

## 2024-07-30 VITALS
SYSTOLIC BLOOD PRESSURE: 138 MMHG | RESPIRATION RATE: 18 BRPM | HEART RATE: 74 BPM | TEMPERATURE: 98.7 F | BODY MASS INDEX: 36.81 KG/M2 | WEIGHT: 235 LBS | DIASTOLIC BLOOD PRESSURE: 90 MMHG

## 2024-07-30 DIAGNOSIS — R31.9 HEMATURIA, UNSPECIFIED TYPE: ICD-10-CM

## 2024-07-30 DIAGNOSIS — Z53.21 PATIENT LEFT BEFORE EVALUATION BY PHYSICIAN: Primary | ICD-10-CM

## 2024-07-30 LAB
BACTERIA UR QL AUTO: ABNORMAL /HPF
BILIRUB UR QL STRIP: NEGATIVE
CLARITY UR: ABNORMAL
COLOR UR: YELLOW
GLUCOSE UR STRIP-MCNC: NEGATIVE MG/DL
HGB UR QL STRIP.AUTO: ABNORMAL
HYALINE CASTS UR QL AUTO: ABNORMAL /LPF
KETONES UR QL STRIP: NEGATIVE
LEUKOCYTE ESTERASE UR QL STRIP.AUTO: ABNORMAL
NITRITE UR QL STRIP: NEGATIVE
PH UR STRIP.AUTO: 6.5 [PH] (ref 5–8)
PROT UR QL STRIP: NEGATIVE
RBC # UR STRIP: ABNORMAL /HPF
REF LAB TEST METHOD: ABNORMAL
SP GR UR STRIP: 1.01 (ref 1–1.03)
SQUAMOUS #/AREA URNS HPF: ABNORMAL /HPF
UROBILINOGEN UR QL STRIP: ABNORMAL
WBC # UR STRIP: ABNORMAL /HPF

## 2024-07-30 PROCEDURE — 81001 URINALYSIS AUTO W/SCOPE: CPT | Performed by: NURSE PRACTITIONER

## 2024-08-05 ENCOUNTER — TELEPHONE (OUTPATIENT)
Dept: INTERNAL MEDICINE | Facility: CLINIC | Age: 44
End: 2024-08-05
Payer: COMMERCIAL

## 2024-08-05 NOTE — TELEPHONE ENCOUNTER
Please call patient to obtain patient current symptoms.  She was seen for possible urinary tract infection and was prescribed Omnicef.  The urine culture did not grow bacteria and if her symptoms are still persisting or she has new symptoms she should be seen either here in the office or walk-in clinic.

## 2024-08-05 NOTE — TELEPHONE ENCOUNTER
Caller: Carlos Heller    Relationship: Self    Best call back number: [phone]     Which medication are you concerned about: ANTI BIOTICS    Who prescribed you this medication: KAREN ROD     When did you start taking this medication:  ? 2 WEEKS AGO    What are your concerns: DOESN'T THINK IT IS WORKING

## 2024-08-06 NOTE — TELEPHONE ENCOUNTER
Pt notified of message from PATRICIA Tidwell   Please call patient to obtain patient current symptoms.  She was seen for possible urinary tract infection and was prescribed Omnicef.  The urine culture did not grow bacteria and if her symptoms are still persisting or she has new symptoms she should be seen either here in the office or walk-in clinic.   GOOD UNDERSTANDING VERBALIZED.    negative

## 2024-08-18 DIAGNOSIS — E11.65 TYPE 2 DIABETES MELLITUS WITH HYPERGLYCEMIA, WITHOUT LONG-TERM CURRENT USE OF INSULIN: ICD-10-CM

## 2024-08-20 ENCOUNTER — HOSPITAL ENCOUNTER (EMERGENCY)
Facility: HOSPITAL | Age: 44
Discharge: HOME | End: 2024-08-20
Attending: EMERGENCY MEDICINE
Payer: COMMERCIAL

## 2024-08-20 ENCOUNTER — APPOINTMENT (OUTPATIENT)
Dept: CT IMAGING | Facility: HOSPITAL | Age: 44
End: 2024-08-20
Payer: COMMERCIAL

## 2024-08-20 ENCOUNTER — OFFICE VISIT (OUTPATIENT)
Dept: INTERNAL MEDICINE | Facility: CLINIC | Age: 44
End: 2024-08-20
Payer: COMMERCIAL

## 2024-08-20 ENCOUNTER — TELEPHONE (OUTPATIENT)
Dept: INTERNAL MEDICINE | Facility: CLINIC | Age: 44
End: 2024-08-20
Payer: COMMERCIAL

## 2024-08-20 VITALS
OXYGEN SATURATION: 100 % | WEIGHT: 240 LBS | DIASTOLIC BLOOD PRESSURE: 100 MMHG | RESPIRATION RATE: 20 BRPM | BODY MASS INDEX: 38.57 KG/M2 | HEART RATE: 87 BPM | TEMPERATURE: 98.5 F | SYSTOLIC BLOOD PRESSURE: 143 MMHG | HEIGHT: 66 IN

## 2024-08-20 VITALS
HEART RATE: 77 BPM | TEMPERATURE: 98 F | BODY MASS INDEX: 37.18 KG/M2 | SYSTOLIC BLOOD PRESSURE: 132 MMHG | RESPIRATION RATE: 20 BRPM | WEIGHT: 237.38 LBS | DIASTOLIC BLOOD PRESSURE: 80 MMHG | OXYGEN SATURATION: 98 %

## 2024-08-20 DIAGNOSIS — R10.84 GENERALIZED ABDOMINAL PAIN: Primary | ICD-10-CM

## 2024-08-20 DIAGNOSIS — E11.65 TYPE 2 DIABETES MELLITUS WITH HYPERGLYCEMIA, WITHOUT LONG-TERM CURRENT USE OF INSULIN: ICD-10-CM

## 2024-08-20 DIAGNOSIS — R30.0 DYSURIA: Primary | ICD-10-CM

## 2024-08-20 DIAGNOSIS — R10.30 SEVERE GROIN PAIN: ICD-10-CM

## 2024-08-20 LAB
ALBUMIN SERPL-MCNC: 4 G/DL (ref 3.5–5.2)
ALBUMIN/GLOB SERPL: 1.2 G/DL
ALP SERPL-CCNC: 50 U/L (ref 39–117)
ALT SERPL W P-5'-P-CCNC: 15 U/L (ref 1–33)
ANION GAP SERPL CALCULATED.3IONS-SCNC: 9 MMOL/L (ref 5–15)
AST SERPL-CCNC: 17 U/L (ref 1–32)
BACTERIA UR QL AUTO: ABNORMAL /HPF
BASOPHILS # BLD AUTO: 0.04 10*3/MM3 (ref 0–0.2)
BASOPHILS NFR BLD AUTO: 0.4 % (ref 0–1.5)
BILIRUB SERPL-MCNC: 1.1 MG/DL (ref 0–1.2)
BILIRUB UR QL STRIP: NEGATIVE
BUN SERPL-MCNC: 5 MG/DL (ref 6–20)
BUN/CREAT SERPL: 6.2 (ref 7–25)
CALCIUM SPEC-SCNC: 9.4 MG/DL (ref 8.6–10.5)
CHLORIDE SERPL-SCNC: 104 MMOL/L (ref 98–107)
CLARITY UR: CLEAR
CO2 SERPL-SCNC: 26 MMOL/L (ref 22–29)
COLOR UR: YELLOW
CREAT SERPL-MCNC: 0.81 MG/DL (ref 0.57–1)
DEPRECATED RDW RBC AUTO: 40.4 FL (ref 37–54)
EGFRCR SERPLBLD CKD-EPI 2021: 93.1 ML/MIN/1.73
EOSINOPHIL # BLD AUTO: 0.3 10*3/MM3 (ref 0–0.4)
EOSINOPHIL NFR BLD AUTO: 3.1 % (ref 0.3–6.2)
ERYTHROCYTE [DISTWIDTH] IN BLOOD BY AUTOMATED COUNT: 13 % (ref 12.3–15.4)
GLOBULIN UR ELPH-MCNC: 3.4 GM/DL
GLUCOSE SERPL-MCNC: 89 MG/DL (ref 65–99)
GLUCOSE UR STRIP-MCNC: NEGATIVE MG/DL
HCT VFR BLD AUTO: 44.7 % (ref 34–46.6)
HGB BLD-MCNC: 15.2 G/DL (ref 12–15.9)
HGB UR QL STRIP.AUTO: ABNORMAL
HOLD SPECIMEN: NORMAL
HYALINE CASTS UR QL AUTO: ABNORMAL /LPF
IMM GRANULOCYTES # BLD AUTO: 0.04 10*3/MM3 (ref 0–0.05)
IMM GRANULOCYTES NFR BLD AUTO: 0.4 % (ref 0–0.5)
KETONES UR QL STRIP: NEGATIVE
LEUKOCYTE ESTERASE UR QL STRIP.AUTO: ABNORMAL
LIPASE SERPL-CCNC: 22 U/L (ref 13–60)
LYMPHOCYTES # BLD AUTO: 2.94 10*3/MM3 (ref 0.7–3.1)
LYMPHOCYTES NFR BLD AUTO: 30.8 % (ref 19.6–45.3)
MCH RBC QN AUTO: 29.1 PG (ref 26.6–33)
MCHC RBC AUTO-ENTMCNC: 34 G/DL (ref 31.5–35.7)
MCV RBC AUTO: 85.6 FL (ref 79–97)
MONOCYTES # BLD AUTO: 0.95 10*3/MM3 (ref 0.1–0.9)
MONOCYTES NFR BLD AUTO: 9.9 % (ref 5–12)
NEUTROPHILS NFR BLD AUTO: 5.28 10*3/MM3 (ref 1.7–7)
NEUTROPHILS NFR BLD AUTO: 55.4 % (ref 42.7–76)
NITRITE UR QL STRIP: NEGATIVE
NRBC BLD AUTO-RTO: 0 /100 WBC (ref 0–0.2)
PH UR STRIP.AUTO: 6.5 [PH] (ref 5–8)
PLATELET # BLD AUTO: 249 10*3/MM3 (ref 140–450)
PMV BLD AUTO: 9.3 FL (ref 6–12)
POTASSIUM SERPL-SCNC: 3.9 MMOL/L (ref 3.5–5.2)
PROT SERPL-MCNC: 7.4 G/DL (ref 6–8.5)
PROT UR QL STRIP: NEGATIVE
RBC # BLD AUTO: 5.22 10*6/MM3 (ref 3.77–5.28)
RBC # UR STRIP: ABNORMAL /HPF
REF LAB TEST METHOD: ABNORMAL
SODIUM SERPL-SCNC: 139 MMOL/L (ref 136–145)
SP GR UR STRIP: 1.01 (ref 1–1.03)
SQUAMOUS #/AREA URNS HPF: ABNORMAL /HPF
UROBILINOGEN UR QL STRIP: ABNORMAL
WBC # UR STRIP: ABNORMAL /HPF
WBC NRBC COR # BLD AUTO: 9.55 10*3/MM3 (ref 3.4–10.8)
WHOLE BLOOD HOLD COAG: NORMAL
WHOLE BLOOD HOLD SPECIMEN: NORMAL
YEAST URNS QL MICRO: ABNORMAL /HPF

## 2024-08-20 PROCEDURE — 25010000002 ONDANSETRON PER 1 MG: Performed by: PHYSICIAN ASSISTANT

## 2024-08-20 PROCEDURE — 99284 EMERGENCY DEPT VISIT MOD MDM: CPT

## 2024-08-20 PROCEDURE — 25010000002 KETOROLAC TROMETHAMINE PER 15 MG: Performed by: PHYSICIAN ASSISTANT

## 2024-08-20 PROCEDURE — 81001 URINALYSIS AUTO W/SCOPE: CPT | Performed by: EMERGENCY MEDICINE

## 2024-08-20 PROCEDURE — 99214 OFFICE O/P EST MOD 30 MIN: CPT | Performed by: STUDENT IN AN ORGANIZED HEALTH CARE EDUCATION/TRAINING PROGRAM

## 2024-08-20 PROCEDURE — 85025 COMPLETE CBC W/AUTO DIFF WBC: CPT | Performed by: EMERGENCY MEDICINE

## 2024-08-20 PROCEDURE — 74176 CT ABD & PELVIS W/O CONTRAST: CPT

## 2024-08-20 PROCEDURE — 25010000002 MORPHINE PER 10 MG: Performed by: EMERGENCY MEDICINE

## 2024-08-20 PROCEDURE — 83690 ASSAY OF LIPASE: CPT | Performed by: EMERGENCY MEDICINE

## 2024-08-20 PROCEDURE — 96374 THER/PROPH/DIAG INJ IV PUSH: CPT

## 2024-08-20 PROCEDURE — 80053 COMPREHEN METABOLIC PANEL: CPT | Performed by: EMERGENCY MEDICINE

## 2024-08-20 PROCEDURE — 96375 TX/PRO/DX INJ NEW DRUG ADDON: CPT

## 2024-08-20 RX ORDER — KETOROLAC TROMETHAMINE 30 MG/ML
15 INJECTION, SOLUTION INTRAMUSCULAR; INTRAVENOUS ONCE
Status: COMPLETED | OUTPATIENT
Start: 2024-08-20 | End: 2024-08-20

## 2024-08-20 RX ORDER — DICYCLOMINE HCL 20 MG
20 TABLET ORAL EVERY 6 HOURS
Qty: 12 TABLET | Refills: 0 | Status: SHIPPED | OUTPATIENT
Start: 2024-08-20

## 2024-08-20 RX ORDER — SODIUM CHLORIDE 9 MG/ML
10 INJECTION, SOLUTION INTRAMUSCULAR; INTRAVENOUS; SUBCUTANEOUS AS NEEDED
Status: DISCONTINUED | OUTPATIENT
Start: 2024-08-20 | End: 2024-08-20 | Stop reason: HOSPADM

## 2024-08-20 RX ORDER — FLUCONAZOLE 150 MG/1
150 TABLET ORAL ONCE
Qty: 1 TABLET | Refills: 0 | Status: SHIPPED | OUTPATIENT
Start: 2024-08-20 | End: 2024-08-20

## 2024-08-20 RX ORDER — PHENAZOPYRIDINE HYDROCHLORIDE 200 MG/1
200 TABLET, FILM COATED ORAL 3 TIMES DAILY PRN
Qty: 6 TABLET | Refills: 0 | Status: SHIPPED | OUTPATIENT
Start: 2024-08-20

## 2024-08-20 RX ORDER — MORPHINE SULFATE 4 MG/ML
4 INJECTION, SOLUTION INTRAMUSCULAR; INTRAVENOUS ONCE
Status: COMPLETED | OUTPATIENT
Start: 2024-08-20 | End: 2024-08-20

## 2024-08-20 RX ORDER — ONDANSETRON 2 MG/ML
4 INJECTION INTRAMUSCULAR; INTRAVENOUS ONCE
Status: COMPLETED | OUTPATIENT
Start: 2024-08-20 | End: 2024-08-20

## 2024-08-20 RX ORDER — SEMAGLUTIDE 0.68 MG/ML
INJECTION, SOLUTION SUBCUTANEOUS
OUTPATIENT
Start: 2024-08-20

## 2024-08-20 RX ADMIN — MORPHINE SULFATE 4 MG: 4 INJECTION, SOLUTION INTRAMUSCULAR; INTRAVENOUS at 11:27

## 2024-08-20 RX ADMIN — KETOROLAC TROMETHAMINE 15 MG: 30 INJECTION, SOLUTION INTRAMUSCULAR; INTRAVENOUS at 10:56

## 2024-08-20 RX ADMIN — ONDANSETRON 4 MG: 2 INJECTION INTRAMUSCULAR; INTRAVENOUS at 11:27

## 2024-08-20 NOTE — Clinical Note
[facility] EMERGENCY DEPARTMENT  [address]  [facility] [address]  Phone: [phone]    Carlos Heller was seen and treated in our emergency department on 8/20/2024.  She may return to work on 08/22/2024.         Thank you for choosing [facility].    Kiran Stock MD

## 2024-08-20 NOTE — TELEPHONE ENCOUNTER
Patient was seen in office today, she stated she needs an increase for ozempic. She stated she has not taken medication in about 3 weeks due to her pain. She is currently on the 0.5 dose.

## 2024-08-20 NOTE — PROGRESS NOTES
"    Follow Up Office Visit      Date: 2024   Patient Name: Carlos Heller  : 1980   MRN: [id number]     Chief Complaint:    Chief Complaint   Patient presents with    Urinary Frequency       History of Present Illness: Carlos Heller is a 44 y.o. female who is here today for severe abdominal pain.    Urinary Frequency   Associated symptoms include frequency.     History of Present Illness  The patient presents for evaluation of abdominal pain.    She reports experiencing severe abdominal pain, which has worsened since her last visit to Dr. Barkley in 2024. The pain is so intense that it causes her to nearly fall off the toilet during urination or bowel movements. She describes the sensation as if \"someone is ripping the inside\" of her. The pain is exacerbated when she walks or uses the bathroom but is manageable when she stands still. Also noted pain when hitting bumps on the drive here.     She does not report any presence of diarrhea, nausea, vomiting, fevers, or vaginal discharge. She has a history of hysterectomy with one ovary remaining, although she is unsure of which side. She recalls a similar episode of severe pain approximately 3 years ago, which was treated with intravenous antibiotics over 4 days. She suspects her current pain may be related to her kidneys.    She has been managing the pain with ibuprofen 600 mg.        Subjective      Review of Systems:   Review of Systems   Genitourinary:  Positive for frequency.       I have reviewed the patients family history, social history, past medical history, past surgical history and have updated it as appropriate.     Medications:     Current Outpatient Medications:     famotidine (Pepcid) 20 MG tablet, Take 2 tablets by mouth 2 (Two) Times a Day., Disp: 30 tablet, Rfl: 2    ondansetron ODT (ZOFRAN-ODT) 8 MG disintegrating tablet, Place 1 tablet on the tongue Every 8 (Eight) Hours As Needed for Nausea or Vomiting., Disp: 20 " tablet, Rfl: 0    Semaglutide,0.25 or 0.5MG/DOS, (OZEMPIC) 2 MG/3ML solution pen-injector, Inject 0.5 mg under the skin into the appropriate area as directed 1 (One) Time Per Week., Disp: 3 mL, Rfl: 0    vitamin D (ERGOCALCIFEROL) 1.25 MG (40319 UT) capsule capsule, Take 1 capsule by mouth 1 (One) Time Per Week., Disp: 6 capsule, Rfl: 0    Current Facility-Administered Medications:     cyanocobalamin injection 1,000 mcg, 1,000 mcg, Intramuscular, Q28 Days, [facility], APRN, 1,000 mcg at 07/24/24 1152    Allergies:   Allergies   Allergen Reactions    Influenza Vaccines GI Intolerance     Other reaction(s): GI Intolerance    Sulfamethoxazole-Trimethoprim Unknown - Low Severity    Doxycycline GI Intolerance    Wellbutrin [Bupropion] Palpitations       Objective     Physical Exam: Please see above  Vital Signs:   Vitals:    08/20/24 0906   BP: 132/80   BP [facility]: Left arm   Patient Position: Sitting   Cuff Size: Adult   Pulse: 77   Resp: 20   Temp: 98 °F (36.7 °C)   TempSrc: Temporal   SpO2: 98%   Weight: 108 kg (237 lb 6 oz)   PainSc:   8   PainLoc: Groin     Body mass index is 37.18 kg/m².    Physical Exam  Vitals reviewed.   Constitutional:       Appearance: Normal appearance. She is obese. She is ill-appearing. She is not toxic-appearing.      Comments: Appears uncomfortable   HENT:      Head: Normocephalic and atraumatic.      Right Ear: External ear normal.      Left Ear: External ear normal.      Nose: Nose normal. No congestion.      Mouth/Throat:      Mouth: Mucous membranes are moist.      Pharynx: No oropharyngeal exudate or posterior oropharyngeal erythema.   Eyes:      General: No scleral icterus.     Extraocular Movements: Extraocular movements intact.   Cardiovascular:      Rate and Rhythm: Normal rate and regular rhythm.      Pulses: Normal pulses.      Heart sounds: Normal heart sounds.   Pulmonary:      Effort: Pulmonary effort is normal. No respiratory distress.      Breath sounds: Normal breath  sounds. No stridor. No wheezing, rhonchi or rales.   Abdominal:      General: Abdomen is flat. Bowel sounds are normal. There is no distension.      Palpations: Abdomen is soft. There is no mass.      Tenderness: There is abdominal tenderness. There is guarding and rebound. There is no right CVA tenderness or left CVA tenderness.      Comments: Pain localized to RLQ, positive rovsing sign   Musculoskeletal:         General: No swelling or tenderness. Normal range of motion.      Cervical back: Normal range of motion. No rigidity.   Skin:     General: Skin is warm and dry.      Capillary Refill: Capillary refill takes less than 2 seconds.   Neurological:      General: No focal deficit present.      Mental Status: She is alert and oriented to person, place, and time.   Psychiatric:         Mood and Affect: Mood normal.         Behavior: Behavior normal.         Judgment: Judgment normal.       Physical Exam        Procedures    Results:   Labs:   Hemoglobin A1C   Date Value Ref Range Status   05/17/2024 5.4 4.5 - 5.7 % Final   07/17/2018 8.10 (H) 4.80 - 5.60 % Final     TSH   Date Value Ref Range Status   03/29/2023 1.190 0.450 - 4.500 uIU/mL Final      Results        Imaging:   No valid procedures specified.     Assessment / Plan      Assessment/Plan:   Problem List Items Addressed This Visit    None  Visit Diagnoses       Dysuria    -  Primary    Severe groin pain                Assessment & Plan  1. Abdominal pain.  Uncertain prognosis.   The patient reports severe abdominal pain, which has worsened over the past day. The pain is described as intense, especially when using the bathroom, and has occurred intermittently over the past month. There is no associated diarrhea, nausea, vomiting, or fever. She has a history of blood in her urine, but recent tests did not show any on microscopy. Differential diagnoses include appendicitis, ovarian torsion, ovarian cyst, cystitis/pyelonephritis, pid, kidney stones. Given the  severity of her symptoms, she is advised to go to the emergency room for immediate evaluation, including lab work and a CT scan. The patient was also advised to have her  drive her to the ER for safety. Spoke with NPDerek at [facility] for hand off.     2. Medication Management.  She has been taking ibuprofen 600 mg for pain relief. She should continue this as needed until further evaluation at the ER.           Follow Up:   Return if symptoms worsen or fail to improve.      Jesse Ramirez MD  [facility]    Patient or patient representative verbalized consent for the use of Ambient Listening during the visit with  Jesse Ramirez MD for chart documentation. 8/20/2024  09:29 EDT

## 2024-08-20 NOTE — TELEPHONE ENCOUNTER
Lets continue the same dose since she has not been taking the medication for 3 weeks.  We can increase her dose after 4 doses at 0.5 mg.  Thanks.

## 2024-08-20 NOTE — ED PROVIDER NOTES
Subjective   History of Present Illness  44-year-old female presents emergency department today with abdominal cramping.  This been going on since about Saturday.  She reports she had similar episode back in May but they never found the cause.  She is never had a colonoscopy.  She does report dysuria and frequency.  She states that she is currently on an oral antibiotic.  She denies any vaginal itching.  She has had no fevers no chills.  Denies any melena medic easier hematemesis.    History provided by:  Patient  Abdominal Pain  Pain location:  Suprapubic  Pain quality: cramping and dull    Pain radiates to:  Does not radiate  Pain severity:  Moderate  Onset quality:  Gradual  Duration:  3 days  Timing:  Intermittent  Progression:  Waxing and waning  Chronicity:  New  Context: not alcohol use, not awakening from sleep, not eating, not previous surgeries, not recent sexual activity, not sick contacts, not suspicious food intake and not trauma    Relieved by:  Nothing  Worsened by:  Nothing  Ineffective treatments:  None tried  Associated symptoms: no belching, no constipation, no dysuria, no fatigue, no flatus, no hematemesis, no hematochezia, no hematuria, no nausea, no shortness of breath, no vaginal bleeding, no vaginal discharge and no vomiting    Risk factors: no alcohol abuse and no NSAID use        Review of Systems   Constitutional:  Negative for fatigue.   Respiratory:  Negative for chest tightness and shortness of breath.    Gastrointestinal:  Positive for abdominal pain. Negative for constipation, flatus, hematemesis, hematochezia, nausea and vomiting.   Genitourinary:  Negative for dysuria, hematuria, vaginal bleeding and vaginal discharge.       Past Medical History:   Diagnosis Date    Abscess of vulva 05/19/2016    Benign essential microscopic hematuria 04/12/2023    Carpal tunnel syndrome     NSAIDS daily, also gets steroid injection of right hand, last1/2018    Depression     Elevated ALT  measurement     Fatigue     Fibromyalgia     patient thinks related to obesity    Fibromyalgia     Former smoker     GERD (gastroesophageal reflux disease)     omeprazole BID and tums prn.  EGD GDW  40 cm, no HH, path sugg IGNACIO. serum h. pyl neg    GERD (gastroesophageal reflux disease)     History of bariatric surgery 2018    Hx MRSA infection     on (L) leg, treated w/ I&D, IV abx.       Hyperlipidemia     Joint pain     back, shoulder, hips. Recented Rx mobic. Steroid Injection of  L knee 3/2018    Morbid obesity     ADDI (obstructive sleep apnea)     semi compliant with CPAP    Psoriasis of scalp     Pulmonary embolism     unknown etiology, tx w/ coumadin    Spinal headache     Type 2 diabetes mellitus     dx , follows w/ Endocrinology, on insulin since  checks sugar once a week    Wears glasses     Wears glasses        Allergies   Allergen Reactions    Influenza Vaccines GI Intolerance     Other reaction(s): GI Intolerance    Sulfamethoxazole-Trimethoprim Unknown - Low Severity    Doxycycline GI Intolerance    Wellbutrin [Bupropion] Palpitations       Past Surgical History:   Procedure Laterality Date    ABDOMINAL HYSTERECTOMY      d/t postpartum bleeding  with ,  partial     BILATERAL BREAST REDUCTION  2006    CARPAL TUNNEL RELEASE Left 2015     SECTION  , 2009    x2, horizontal    ENDOSCOPY N/A 2017    Procedure: ESOPHAGOGASTRODUODENOSCOPY;  Surgeon: Jean Cardozo MD;  [facility]:  [facility];  Service:     ENDOSCOPY N/A 2018    Procedure: ESOPHAGOGASTRODUODENOSCOPY;  Surgeon: Jean Cardozo MD;  [facility]:  [facility];  Service: Bariatric    GASTRIC SLEEVE LAPAROSCOPIC N/A 2018    Procedure: GASTRIC SLEEVE LAPAROSCOPICHIATAL;  Surgeon: Jean Cardozo MD;  [facility]:  [facility];  Service: Bariatric    LAPAROSCOPIC SALPINGOOPHERECTOMY      for tubal pregnancy    OTHER SURGICAL HISTORY      denies anesthesia issues    WISDOM TOOTH  EXTRACTION         Family History   Problem Relation Age of Onset    Diabetes Mother 35    Hypertension Brother     Sleep apnea Brother     No Known Problems Father     No Known Problems Sister     No Known Problems Maternal Grandmother     No Known Problems Maternal Grandfather     No Known Problems Paternal Grandmother     No Known Problems Paternal Grandfather        Social History     Socioeconomic History    Marital status: Single    Number of children: 3    Years of education: GED   Tobacco Use    Smoking status: Every Day     Current packs/day: 0.00     Average packs/day: 0.5 packs/day for 20.0 years (10.0 ttl pk-yrs)     Types: Cigarettes     Start date: 2002     Last attempt to quit: 2022     Years since quittin.4    Smokeless tobacco: Never   Vaping Use    Vaping status: Never Used   Substance and Sexual Activity    Alcohol use: No    Drug use: No    Sexual activity: Defer     Comment: no hormones           Objective   Physical Exam  Vitals and nursing note reviewed.   Constitutional:       General: She is not in acute distress.     Appearance: She is well-developed. She is not diaphoretic.   HENT:      Head: Normocephalic and atraumatic.      Nose: Nose normal.   Eyes:      General: No scleral icterus.     Conjunctiva/sclera: Conjunctivae normal.   Cardiovascular:      Rate and Rhythm: Normal rate and regular rhythm.      Heart sounds: Normal heart sounds. No murmur heard.  Pulmonary:      Effort: Pulmonary effort is normal. No respiratory distress.      Breath sounds: Normal breath sounds.   Abdominal:      General: Bowel sounds are normal.      Palpations: Abdomen is soft.      Tenderness: There is generalized abdominal tenderness. There is no right CVA tenderness, left CVA tenderness, guarding or rebound. Negative signs include Orantes's sign, Rovsing's sign, McBurney's sign, psoas sign and obturator sign.      Hernia: No hernia is present.   Musculoskeletal:         General: Normal  range of motion.      Cervical back: Normal range of motion and neck supple.   Skin:     General: Skin is warm and dry.   Neurological:      Mental Status: She is alert and oriented to person, place, and time.   Psychiatric:         Behavior: Behavior normal.         Procedures           ED Course                                 Recent Results (from the past 24 hour(s))   Comprehensive Metabolic Panel    Collection Time: 08/20/24 10:27 AM    Specimen: Blood   Result Value Ref Range    Glucose 89 65 - 99 mg/dL    BUN 5 (L) 6 - 20 mg/dL    Creatinine 0.81 0.57 - 1.00 mg/dL    Sodium 139 136 - 145 mmol/L    Potassium 3.9 3.5 - 5.2 mmol/L    Chloride 104 98 - 107 mmol/L    CO2 26.0 22.0 - 29.0 mmol/L    Calcium 9.4 8.6 - 10.5 mg/dL    Total Protein 7.4 6.0 - 8.5 g/dL    Albumin 4.0 3.5 - 5.2 g/dL    ALT (SGPT) 15 1 - 33 U/L    AST (SGOT) 17 1 - 32 U/L    Alkaline Phosphatase 50 39 - 117 U/L    Total Bilirubin 1.1 0.0 - 1.2 mg/dL    Globulin 3.4 gm/dL    A/G Ratio 1.2 g/dL    BUN/Creatinine Ratio 6.2 (L) 7.0 - 25.0    Anion Gap 9.0 5.0 - 15.0 mmol/L    eGFR 93.1 >60.0 mL/min/1.73   Lipase    Collection Time: 08/20/24 10:27 AM    Specimen: Blood   Result Value Ref Range    Lipase 22 13 - 60 U/L   Green Top (Gel)    Collection Time: 08/20/24 10:27 AM   Result Value Ref Range    Extra Tube Hold for add-ons.    Lavender Top    Collection Time: 08/20/24 10:27 AM   Result Value Ref Range    Extra Tube hold for add-on    Gold Top - SST    Collection Time: 08/20/24 10:27 AM   Result Value Ref Range    Extra Tube Hold for add-ons.    Gray Top    Collection Time: 08/20/24 10:27 AM   Result Value Ref Range    Extra Tube Hold for add-ons.    Light Blue Top    Collection Time: 08/20/24 10:27 AM   Result Value Ref Range    Extra Tube Hold for add-ons.    CBC Auto Differential    Collection Time: 08/20/24 10:27 AM    Specimen: Blood   Result Value Ref Range    WBC 9.55 3.40 - 10.80 10*3/mm3    RBC 5.22 3.77 - 5.28 10*6/mm3     Hemoglobin 15.2 12.0 - 15.9 g/dL    Hematocrit 44.7 34.0 - 46.6 %    MCV 85.6 79.0 - 97.0 fL    MCH 29.1 26.6 - 33.0 pg    MCHC 34.0 31.5 - 35.7 g/dL    RDW 13.0 12.3 - 15.4 %    RDW-SD 40.4 37.0 - 54.0 fl    MPV 9.3 6.0 - 12.0 fL    Platelets 249 140 - 450 10*3/mm3    Neutrophil % 55.4 42.7 - 76.0 %    Lymphocyte % 30.8 19.6 - 45.3 %    Monocyte % 9.9 5.0 - 12.0 %    Eosinophil % 3.1 0.3 - 6.2 %    Basophil % 0.4 0.0 - 1.5 %    Immature Grans % 0.4 0.0 - 0.5 %    Neutrophils, Absolute 5.28 1.70 - 7.00 10*3/mm3    Lymphocytes, Absolute 2.94 0.70 - 3.10 10*3/mm3    Monocytes, Absolute 0.95 (H) 0.10 - 0.90 10*3/mm3    Eosinophils, Absolute 0.30 0.00 - 0.40 10*3/mm3    Basophils, Absolute 0.04 0.00 - 0.20 10*3/mm3    Immature Grans, Absolute 0.04 0.00 - 0.05 10*3/mm3    nRBC 0.0 0.0 - 0.2 /100 WBC   Urinalysis With Microscopic If Indicated (No Culture) - Urine, Clean Catch    Collection Time: 08/20/24 11:52 AM    Specimen: Urine, Clean Catch   Result Value Ref Range    Color, UA Yellow Yellow, Straw    Appearance, UA Clear Clear    pH, UA 6.5 5.0 - 8.0    Specific Gravity, UA 1.007 1.001 - 1.030    Glucose, UA Negative Negative    Ketones, UA Negative Negative    Bilirubin, UA Negative Negative    Blood, UA Small (1+) (A) Negative    Protein, UA Negative Negative    Leuk Esterase, UA Moderate (2+) (A) Negative    Nitrite, UA Negative Negative    Urobilinogen, UA 0.2 E.U./dL 0.2 - 1.0 E.U./dL   Urinalysis, Microscopic Only - Urine, Clean Catch    Collection Time: 08/20/24 11:52 AM    Specimen: Urine, Clean Catch   Result Value Ref Range    RBC, UA 3-5 (A) None Seen, 0-2 /HPF    WBC, UA 6-10 (A) None Seen, 0-2 /HPF    Bacteria, UA 1+ (A) None Seen, Trace /HPF    Squamous Epithelial Cells, UA 7-12 (A) None Seen, 0-2 /HPF    Yeast, UA Small/1+ Yeast (A) None Seen /HPF    Hyaline Casts, UA Unable to determine due to loaded field 0 - 6 /LPF    Methodology Manual Light Microscopy      Note: In addition to lab results from  "this visit, the labs listed above may include labs taken at another facility or during a different encounter within the last 24 hours. Please correlate lab times with ED admission and discharge times for further clarification of the services performed during this visit.    CT Abdomen Pelvis Without Contrast   Final Result   Impression:   1.Examination is somewhat limited due to lack of IV contrast administration.   2.Given this limitation, no acute abnormality is identified within the abdomen or pelvis.   3.Additional findings as detailed above.               Electronically Signed: Ari Carvalho MD     8/20/2024 11:23 AM EDT     Workstation ID: [id number]        Vitals:    08/20/24 0949   BP: 143/100   BP [facility]: Right arm   Patient Position: Standing   Pulse: 87   Resp: 20   Temp: 98.5 °F (36.9 °C)   TempSrc: Oral   SpO2: 100%   Weight: 109 kg (240 lb)   Height: 167.6 cm (66\")     Medications   Sodium Chloride (PF) 0.9 % 10 mL (has no administration in time range)   ketorolac (TORADOL) injection 15 mg (15 mg Intravenous Given 8/20/24 1056)   Morphine sulfate (PF) injection 4 mg (4 mg Intravenous Given 8/20/24 1127)   ondansetron (ZOFRAN) injection 4 mg (4 mg Intravenous Given 8/20/24 1127)     ECG/EMG Results (last 24 hours)       ** No results found for the last 24 hours. **          No orders to display                   Medical Decision Making  Problems Addressed:  Generalized abdominal pain: complicated acute illness or injury    Amount and/or Complexity of Data Reviewed  Labs: ordered.  Radiology: ordered.    Risk  Prescription drug management.        Final diagnoses:   Generalized abdominal pain       ED Disposition  ED Disposition       ED Disposition   Discharge    Condition   Stable    Comment   --               Virgil Barkley MD  [address] [address] [phone]      For suture removal         Medication List        New Prescriptions      dicyclomine 20 MG " tablet  Commonly known as: BENTYL  Take 1 tablet by mouth Every 6 (Six) Hours.     fluconazole 150 MG tablet  Commonly known as: DIFLUCAN  Take 1 tablet by mouth 1 (One) Time for 1 dose.     phenazopyridine 200 MG tablet  Commonly known as: PYRIDIUM  Take 1 tablet by mouth 3 (Three) Times a Day As Needed for Bladder Spasms.               Where to Get Your Medications        These medications were sent to [facility]/pharmacy #[id number] - [place], [place] - [address] [facility] - [phone]  - [phone]   [address] [address] [facility] [address]      Phone: [phone]   dicyclomine 20 MG tablet  fluconazole 150 MG tablet  phenazopyridine 200 MG tablet            Leonides Nowak PA  08/23/24 114

## 2024-09-06 ENCOUNTER — CLINICAL SUPPORT (OUTPATIENT)
Dept: INTERNAL MEDICINE | Facility: CLINIC | Age: 44
End: 2024-09-06
Payer: COMMERCIAL

## 2024-09-06 PROCEDURE — 96372 THER/PROPH/DIAG INJ SC/IM: CPT | Performed by: NURSE PRACTITIONER

## 2024-09-06 RX ADMIN — CYANOCOBALAMIN 1000 MCG: 1000 INJECTION, SOLUTION INTRAMUSCULAR; SUBCUTANEOUS at 16:10

## 2024-09-18 ENCOUNTER — PRIOR AUTHORIZATION (OUTPATIENT)
Dept: INTERNAL MEDICINE | Facility: CLINIC | Age: 44
End: 2024-09-18
Payer: COMMERCIAL

## 2024-09-18 DIAGNOSIS — E11.65 TYPE 2 DIABETES MELLITUS WITH HYPERGLYCEMIA, WITHOUT LONG-TERM CURRENT USE OF INSULIN: ICD-10-CM

## 2024-09-18 DIAGNOSIS — E55.9 VITAMIN D DEFICIENCY: ICD-10-CM

## 2024-09-18 RX ORDER — ERGOCALCIFEROL 1.25 MG/1
50000 CAPSULE, LIQUID FILLED ORAL
Qty: 6 CAPSULE | Refills: 0 | Status: SHIPPED | OUTPATIENT
Start: 2024-09-18

## 2024-11-11 ENCOUNTER — PATIENT ROUNDING (BHMG ONLY) (OUTPATIENT)
Dept: URGENT CARE | Facility: CLINIC | Age: 44
End: 2024-11-11
Payer: COMMERCIAL

## 2024-12-18 RX ORDER — FAMOTIDINE 20 MG/1
40 TABLET, FILM COATED ORAL 2 TIMES DAILY
Qty: 30 TABLET | Refills: 2 | Status: SHIPPED | OUTPATIENT
Start: 2024-12-18

## 2025-01-13 DIAGNOSIS — R35.0 FREQUENT URINATION: Primary | ICD-10-CM

## 2025-01-13 DIAGNOSIS — Z23 COVID-19 VACCINE ADMINISTERED: ICD-10-CM

## 2025-01-14 ENCOUNTER — OFFICE VISIT (OUTPATIENT)
Dept: INTERNAL MEDICINE | Facility: CLINIC | Age: 45
End: 2025-01-14
Payer: COMMERCIAL

## 2025-01-14 VITALS
TEMPERATURE: 97.3 F | SYSTOLIC BLOOD PRESSURE: 116 MMHG | WEIGHT: 245.8 LBS | DIASTOLIC BLOOD PRESSURE: 70 MMHG | BODY MASS INDEX: 39.67 KG/M2 | RESPIRATION RATE: 20 BRPM | HEART RATE: 74 BPM

## 2025-01-14 DIAGNOSIS — R09.82 POST-NASAL DRAINAGE: ICD-10-CM

## 2025-01-14 DIAGNOSIS — R35.0 URINE FREQUENCY: Primary | ICD-10-CM

## 2025-01-14 DIAGNOSIS — N39.0 FREQUENT UTI: ICD-10-CM

## 2025-01-14 DIAGNOSIS — R31.9 HEMATURIA, UNSPECIFIED TYPE: ICD-10-CM

## 2025-01-14 LAB
BILIRUB BLD-MCNC: NEGATIVE MG/DL
CLARITY, POC: CLEAR
COLOR UR: YELLOW
EXPIRATION DATE: ABNORMAL
GLUCOSE UR STRIP-MCNC: NEGATIVE MG/DL
KETONES UR QL: NEGATIVE
LEUKOCYTE EST, POC: NEGATIVE
Lab: ABNORMAL
NITRITE UR-MCNC: NEGATIVE MG/ML
PH UR: 5 [PH] (ref 5–8)
PROT UR STRIP-MCNC: NEGATIVE MG/DL
RBC # UR STRIP: ABNORMAL /UL
SP GR UR: 1.01 (ref 1–1.03)
UROBILINOGEN UR QL: NORMAL

## 2025-01-14 PROCEDURE — 87086 URINE CULTURE/COLONY COUNT: CPT

## 2025-01-14 PROCEDURE — 81003 URINALYSIS AUTO W/O SCOPE: CPT

## 2025-01-14 PROCEDURE — 99214 OFFICE O/P EST MOD 30 MIN: CPT

## 2025-01-14 PROCEDURE — 81015 MICROSCOPIC EXAM OF URINE: CPT

## 2025-01-14 RX ORDER — ONDANSETRON 8 MG/1
8 TABLET, ORALLY DISINTEGRATING ORAL EVERY 8 HOURS PRN
Qty: 20 TABLET | Refills: 0 | Status: SHIPPED | OUTPATIENT
Start: 2025-01-14

## 2025-01-14 RX ORDER — NITROFURANTOIN 25; 75 MG/1; MG/1
100 CAPSULE ORAL 2 TIMES DAILY
Qty: 14 CAPSULE | Refills: 0 | Status: SHIPPED | OUTPATIENT
Start: 2025-01-14 | End: 2025-01-21

## 2025-01-14 RX ORDER — FLUCONAZOLE 150 MG/1
TABLET ORAL
Qty: 2 TABLET | Refills: 1 | Status: SHIPPED | OUTPATIENT
Start: 2025-01-14

## 2025-01-14 RX ORDER — ONDANSETRON 8 MG/1
8 TABLET, ORALLY DISINTEGRATING ORAL EVERY 8 HOURS PRN
Qty: 20 TABLET | Refills: 0 | Status: CANCELLED | OUTPATIENT
Start: 2025-01-14

## 2025-01-14 NOTE — PROGRESS NOTES
Chief Complaint  Urinary Tract Infection (Possible UTI/Frequent urination/Feels like there is mucous that won't go away in her throat)    Rodrigo Heller presents to [facility]  Urinary Tract Infection   Associated symptoms include frequency and urgency. Pertinent negatives include no chills or hematuria.       Patient presents to the office today with concerns for possible UTI.  She states that she feels like she always has odor as well as itching.  She states that she works 12-hour shifts and does not go to the bathroom as often as she should.  She reports urgency and frequency.  She states that her urinalysis results often will show hematuria.  She was referred to urology in the past but has not followed up.     She states she is also concerned about excessive mucus production in her throat and reports sometimes it is hard for her to breathe due to the phlegm production.  She thinks that she needs her tonsils removed.      Objective   Vital Signs:   /70 (BP [facility]: Right arm, Patient Position: Sitting, Cuff Size: Adult)   Pulse 74   Temp 97.3 °F (36.3 °C) (Temporal)   Resp 20   Wt 111 kg (245 lb 12.8 oz)   BMI 39.67 kg/m²     Body mass index is 39.67 kg/m².    Review of Systems   Constitutional:  Negative for chills and fever.   HENT:  Positive for postnasal drip.    Genitourinary:  Positive for frequency and urgency. Negative for dysuria and hematuria.       Past History:  Medical History: has a past medical history of Abscess of vulva (05/19/2016), Benign essential microscopic hematuria (04/12/2023), Carpal tunnel syndrome, Depression, Elevated ALT measurement, Fatigue, Fibromyalgia, Fibromyalgia, Former smoker, GERD (gastroesophageal reflux disease), GERD (gastroesophageal reflux disease), History of bariatric surgery (09/18/2018), MRSA infection, Hyperlipidemia, Joint pain, Morbid obesity, ADDI (obstructive sleep apnea),  Psoriasis of scalp, Pulmonary embolism (), Spinal headache, Type 2 diabetes mellitus, Wears glasses, and Wears glasses.   Surgical History: has a past surgical history that includes Breast Reduction (); Carpal tunnel release (Left, 2015);  section (, ); Laparoscopic salpingoopherectomy (); Other surgical history; Abdominal hysterectomy (); Wittman tooth extraction (); Esophagogastroduodenoscopy (N/A, 2017); Gastric Sleeve (N/A, 2018); and Esophagogastroduodenoscopy (N/A, 2018).   Family History: family history includes Diabetes (age of onset: 35) in her mother; Hypertension in her brother; No Known Problems in her father, maternal grandfather, maternal grandmother, paternal grandfather, paternal grandmother, and sister; Sleep apnea in her brother.   Social History: reports that she has been smoking cigarettes. She started smoking about 22 years ago. She has a 10 pack-year smoking history. She has never used smokeless tobacco. She reports that she does not drink alcohol and does not use drugs.      Current Outpatient Medications:     famotidine (PEPCID) 20 MG tablet, TAKE 2 TABLETS BY MOUTH TWICE A DAY, Disp: 30 tablet, Rfl: 2    ondansetron ODT (ZOFRAN-ODT) 8 MG disintegrating tablet, Place 1 tablet on the tongue Every 8 (Eight) Hours As Needed for Nausea or Vomiting., Disp: 20 tablet, Rfl: 0    Semaglutide, 1 MG/DOSE, (OZEMPIC) 4 MG/3ML solution pen-injector, Inject 1 mg under the skin into the appropriate area as directed 1 (One) Time Per Week., Disp: 3 mL, Rfl: 0    erythromycin (ROMYCIN) 5 MG/GM ophthalmic ointment, Administer  to the right eye Every Night. Apply thin ribbon of medication along lower eyelid (Patient not taking: Reported on 2025), Disp: 3.5 g, Rfl: 0    fluconazole (Diflucan) 150 MG tablet, Take 1 by mouth now the repeat in 3 days, Disp: 2 tablet, Rfl: 1    nitrofurantoin, macrocrystal-monohydrate, (Macrobid) 100 MG capsule, Take 1 capsule by  mouth 2 (Two) Times a Day for 7 days., Disp: 14 capsule, Rfl: 0    vitamin D (ERGOCALCIFEROL) 1.25 MG (71374 UT) capsule capsule, TAKE 1 CAPSULE BY MOUTH 1 TIME PER WEEK. (Patient not taking: Reported on 1/14/2025), Disp: 6 capsule, Rfl: 0    Current Facility-Administered Medications:     cyanocobalamin injection 1,000 mcg, 1,000 mcg, Intramuscular, Q28 Days, Geno House, APRN, 1,000 mcg at 09/06/24 1610    Allergies: Influenza vaccines, Sulfamethoxazole-trimethoprim, Doxycycline, and Wellbutrin [bupropion]    Physical Exam  Vitals and nursing note reviewed.   Constitutional:       General: She is not in acute distress.     Appearance: She is not ill-appearing or toxic-appearing.   HENT:      Head: Normocephalic and atraumatic.      Mouth/Throat:      Pharynx: Posterior oropharyngeal erythema and postnasal drip (Cobblestoning) present.      Tonsils: No tonsillar exudate. 3+ on the right. 3+ on the left.   Cardiovascular:      Rate and Rhythm: Normal rate and regular rhythm.      Pulses: Normal pulses.      Heart sounds: Normal heart sounds. No murmur heard.  Pulmonary:      Effort: Pulmonary effort is normal. No respiratory distress.      Breath sounds: Normal breath sounds. No wheezing, rhonchi or rales.   Abdominal:      General: There is no distension.      Tenderness: There is abdominal tenderness (Suprapubic).   Skin:     General: Skin is warm.   Neurological:      General: No focal deficit present.      Mental Status: She is alert and oriented to person, place, and time. Mental status is at baseline.   Psychiatric:         Mood and Affect: Mood normal.         Behavior: Behavior normal.         Thought Content: Thought content normal.         Judgment: Judgment normal.          Result Review :               Latest Reference Range & Units 01/14/25 15:18   Color, UA Yellow, Straw, Dark Yellow, Ling  Yellow   Appearance, UA Clear  Clear   Specific Gravity, UA 1.005 - 1.030  1.010   pH, UA 5.0 - 8.0  5.0    Glucose Negative mg/dL Negative   Ketones, UA Negative  Negative   Blood, UA Negative  50 Carlos/ul !   Nitrite, UA Negative  Negative   Leukocytes, UA Negative  Negative   Protein, UA Negative mg/dL Negative   Bilirubin, UA Negative  Negative   Urobilinogen, UA Normal, 0.2 E.U./dL  Normal   Expiration Date  11/30/2025   Lot Number  [id number]   !: Data is abnormal       Assessment and Plan    Assessment & Plan  Urine frequency  Point-of-care urinalysis positive for blood today.  Given that she is symptomatic, we will treat with Macrobid 100 mg twice daily for a week.  She is afebrile in office today.  She is not tachycardic nor is she hypotensive or hypertensive.  She is not ill-appearing or toxic-appearing.  Recommend she take with food to prevent GI upset.  Will send her urine off for a culture and will contact her with results when they become available.  She states that she often deals with yeast infections and given her symptom profile, this appears to be consistent with that as well.  Will treat with Diflucan 150 mg for 1 dose and then for her to repeat the dose 72 hours later.    Orders:    POCT urinalysis dipstick, automated    Urine Culture - Urine, Urine, Clean Catch; Future    Urine Culture - Urine, Urine, Clean Catch    fluconazole (Diflucan) 150 MG tablet; Take 1 by mouth now the repeat in 3 days    nitrofurantoin, macrocrystal-monohydrate, (Macrobid) 100 MG capsule; Take 1 capsule by mouth 2 (Two) Times a Day for 7 days.    Hematuria, unspecified type  Will obtain microscopic urinalysis as well.  Will contact with results when they become available.  Will refer to urology given history of hematuria.  Orders:    Urinalysis, Microscopic Only - Urine, Clean Catch; Future    Urinalysis, Microscopic Only - Urine, Clean Catch    fluconazole (Diflucan) 150 MG tablet; Take 1 by mouth now the repeat in 3 days    nitrofurantoin, macrocrystal-monohydrate, (Macrobid) 100 MG capsule; Take 1 capsule by mouth 2 (Two)  Times a Day for 7 days.    Ambulatory Referral to Urology    Frequent UTI  Will refer to urology as patient is requesting any referral as it is been quite sometime since she was previously referred.  Advised this can take up to 2 weeks for scheduling and for her to monitor her MyChart and phone call for updates regarding this appointment.  Orders:    Ambulatory Referral to Urology    Post-nasal drainage  Her tonsils are enlarged on exam.  Recommend that  if she is wanting to have this surgically evaluated, to notify office so an ENT referral could be placed.  Recommend use of over-the-counter Mucinex to help with symptoms of postnasal drainage.       Recommend if symptoms worsen or fail to improve, she return to clinic for further evaluation.  Patient is agreeable to this and verbalized understanding of plan of care.    Follow Up   Return if symptoms worsen or fail to improve, for Next scheduled follow up.  Patient was given instructions and counseling regarding her condition or for health maintenance advice. Please see specific information pulled into the AVS if appropriate.     Leticia Solorzano, PATRICIA

## 2025-01-15 LAB
BACTERIA UR QL AUTO: NORMAL /HPF
HYALINE CASTS UR QL AUTO: NORMAL /LPF
RBC # UR STRIP: NORMAL /HPF
REF LAB TEST METHOD: NORMAL
SQUAMOUS #/AREA URNS HPF: NORMAL /HPF
WBC # UR STRIP: NORMAL /HPF

## 2025-01-15 NOTE — PROGRESS NOTES
Microscopic urinalysis did not demonstrate any abnormalities. I will contact you with the urine culture results when they become available.

## 2025-01-15 NOTE — PROGRESS NOTES
Urine culture with no growth. Recommend continuing medications as prescribed and keep all scheduled follow ups. It looks like our  sent you a message regarding your urology referral so I would recommend following up with urology as discussed.

## 2025-01-16 LAB — BACTERIA SPEC AEROBE CULT: NO GROWTH

## 2025-01-24 DIAGNOSIS — E11.65 TYPE 2 DIABETES MELLITUS WITH HYPERGLYCEMIA, WITHOUT LONG-TERM CURRENT USE OF INSULIN: ICD-10-CM

## 2025-01-24 RX ORDER — SEMAGLUTIDE 1.34 MG/ML
1 INJECTION, SOLUTION SUBCUTANEOUS
Qty: 3 ML | Refills: 0 | Status: SHIPPED | OUTPATIENT
Start: 2025-01-24

## 2025-01-24 NOTE — TELEPHONE ENCOUNTER
"[employer] message sent     Tried to reach patient no answer left voicemail to return call    RELAY:  Please schedule patient for physical on or around 04/01/2025

## 2025-01-24 NOTE — TELEPHONE ENCOUNTER
LOV 07/24/2024  NOV Not scheduled yet    We recently received your message to refill your medication(s).  Our records indicate that you did not schedule a follow up appointment with your provider at your last visit on 07/24/2024. The medication that you are on requires a follow up appointment for additional refills. Patient was to schedule on or around 04/01/2025 for Annual Physical

## 2025-02-07 ENCOUNTER — PATIENT ROUNDING (BHMG ONLY) (OUTPATIENT)
Dept: URGENT CARE | Facility: CLINIC | Age: 45
End: 2025-02-07
Payer: COMMERCIAL

## 2025-02-07 NOTE — ED NOTES
Thank you for letting us care for you in your recent visit to our urgent care center. We would love to hear about your experience with us. Was this the first time you have visited our location?    We’re always looking for ways to make our patients’ experiences even better. Do you have any recommendations on ways we may improve?     I appreciate you taking the time to respond. Please be on the lookout for a survey about your recent visit from [employer] via text or email. We would greatly appreciate if you could fill that out and turn it back in. We want your voice to be heard and we value your feedback.   Thank you for choosing [facility] for your healthcare needs.

## 2025-02-07 NOTE — ED NOTES
Thank you for letting us care for you in your recent visit to our urgent care center. We would love to hear about your experience with us. Was this the first time you have visited our location?    We’re always looking for ways to make our patients’ experiences even better. Do you have any recommendations on ways we may improve?     I appreciate you taking the time to respond. Please be on the lookout for a survey about your recent visit from [employer] via text or email. We would greatly appreciate if you could fill that out and turn it back in. We want your voice to be heard and we value your feedback.   Thank you for choosing [facility] for your healthcare needs.

## 2025-02-28 DIAGNOSIS — E11.65 TYPE 2 DIABETES MELLITUS WITH HYPERGLYCEMIA, WITHOUT LONG-TERM CURRENT USE OF INSULIN: ICD-10-CM

## 2025-03-03 NOTE — TELEPHONE ENCOUNTER
[employer] message sent     Tried to reach patient no answer left voicemail to return call    RELAY:  Please schedule a physical

## 2025-03-03 NOTE — TELEPHONE ENCOUNTER
LOV 07/24/2024  NOV Not scheduled yet    Follow Up   Return if symptoms worsen or fail to improve, for 0/1/24 pascual f/u Dr Barkley.    We recently received your message to refill your medication(s).  Our records indicate that you did not schedule a follow up appointment with your provider at your last visit on 07/24/2024. The medication that you are on requires a follow up appointment for additional refills. Patient was to schedule on or around 10/01/2024 for PHysical

## 2025-03-04 ENCOUNTER — PRIOR AUTHORIZATION (OUTPATIENT)
Dept: INTERNAL MEDICINE | Facility: CLINIC | Age: 45
End: 2025-03-04
Payer: COMMERCIAL

## 2025-05-13 ENCOUNTER — PATIENT ROUNDING (BHMG ONLY) (OUTPATIENT)
Dept: URGENT CARE | Facility: CLINIC | Age: 45
End: 2025-05-13
Payer: COMMERCIAL

## 2025-05-13 NOTE — ED NOTES
Thank you for letting us care for you in your recent visit to our urgent care center. We would love to hear about your experience with us. Was this the first time you have visited our location?    We’re always looking for ways to make our patients’ experiences even better. Do you have any recommendations on ways we may improve?     I appreciate you taking the time to respond. Please be on the lookout for a survey about your recent visit from [email or web address] via text or email. We would greatly appreciate if you could fill that out and turn it back in. We want your voice to be heard and we value your feedback.   Thank you for choosing [facility] for your healthcare needs.

## 2025-05-21 DIAGNOSIS — E11.65 TYPE 2 DIABETES MELLITUS WITH HYPERGLYCEMIA, WITHOUT LONG-TERM CURRENT USE OF INSULIN: ICD-10-CM

## 2025-05-22 RX ORDER — SEMAGLUTIDE 2.68 MG/ML
INJECTION, SOLUTION SUBCUTANEOUS
Qty: 6 ML | Refills: 3 | Status: SHIPPED | OUTPATIENT
Start: 2025-05-22

## 2025-06-19 PROCEDURE — 87086 URINE CULTURE/COLONY COUNT: CPT | Performed by: PHYSICIAN ASSISTANT

## 2025-06-19 PROCEDURE — 87801 DETECT AGNT MULT DNA AMPLI: CPT | Performed by: PHYSICIAN ASSISTANT

## 2025-06-19 PROCEDURE — 87798 DETECT AGENT NOS DNA AMP: CPT | Performed by: PHYSICIAN ASSISTANT

## 2025-06-20 RX ORDER — FAMOTIDINE 20 MG/1
40 TABLET, FILM COATED ORAL 2 TIMES DAILY
Qty: 30 TABLET | Refills: 2 | Status: SHIPPED | OUTPATIENT
Start: 2025-06-20

## 2025-08-18 ENCOUNTER — OFFICE VISIT (OUTPATIENT)
Dept: INTERNAL MEDICINE | Facility: CLINIC | Age: 45
End: 2025-08-18
Payer: COMMERCIAL

## 2025-08-18 ENCOUNTER — RESULTS FOLLOW-UP (OUTPATIENT)
Dept: INTERNAL MEDICINE | Facility: CLINIC | Age: 45
End: 2025-08-18

## 2025-08-18 VITALS
RESPIRATION RATE: 18 BRPM | TEMPERATURE: 98.4 F | HEART RATE: 74 BPM | SYSTOLIC BLOOD PRESSURE: 122 MMHG | WEIGHT: 238.6 LBS | HEIGHT: 64 IN | BODY MASS INDEX: 40.74 KG/M2 | DIASTOLIC BLOOD PRESSURE: 84 MMHG

## 2025-08-18 DIAGNOSIS — Z00.00 HEALTHCARE MAINTENANCE: Primary | ICD-10-CM

## 2025-08-18 DIAGNOSIS — E55.9 VITAMIN D DEFICIENCY: ICD-10-CM

## 2025-08-18 DIAGNOSIS — F51.01 PRIMARY INSOMNIA: ICD-10-CM

## 2025-08-18 DIAGNOSIS — K21.00 GASTROESOPHAGEAL REFLUX DISEASE WITH ESOPHAGITIS WITHOUT HEMORRHAGE: ICD-10-CM

## 2025-08-18 DIAGNOSIS — F31.62 BIPOLAR DISORDER, CURRENT EPISODE MIXED, MODERATE: ICD-10-CM

## 2025-08-18 DIAGNOSIS — E06.1 SUBACUTE THYROIDITIS: ICD-10-CM

## 2025-08-18 DIAGNOSIS — E11.65 TYPE 2 DIABETES MELLITUS WITH HYPERGLYCEMIA, WITHOUT LONG-TERM CURRENT USE OF INSULIN: ICD-10-CM

## 2025-08-18 DIAGNOSIS — S56.911A STRAIN OF RIGHT FOREARM, INITIAL ENCOUNTER: ICD-10-CM

## 2025-08-18 DIAGNOSIS — E53.8 B12 DEFICIENCY: ICD-10-CM

## 2025-08-18 DIAGNOSIS — F17.200 TOBACCO USE DISORDER: ICD-10-CM

## 2025-08-18 DIAGNOSIS — Z12.31 ENCOUNTER FOR SCREENING MAMMOGRAM FOR MALIGNANT NEOPLASM OF BREAST: ICD-10-CM

## 2025-08-18 DIAGNOSIS — E78.5 HYPERLIPIDEMIA, UNSPECIFIED HYPERLIPIDEMIA TYPE: ICD-10-CM

## 2025-08-18 DIAGNOSIS — E55.9 VITAMIN D DEFICIENCY: Primary | ICD-10-CM

## 2025-08-18 DIAGNOSIS — D50.9 IRON DEFICIENCY ANEMIA, UNSPECIFIED IRON DEFICIENCY ANEMIA TYPE: ICD-10-CM

## 2025-08-18 PROBLEM — R53.82 CHRONIC FATIGUE: Status: RESOLVED | Noted: 2023-03-29 | Resolved: 2025-08-18

## 2025-08-18 PROBLEM — R10.2 PELVIC PAIN: Status: RESOLVED | Noted: 2024-05-17 | Resolved: 2025-08-18

## 2025-08-18 LAB
25(OH)D3 SERPL-MCNC: 14.3 NG/ML (ref 30–100)
ALBUMIN SERPL-MCNC: 4 G/DL (ref 3.5–5.2)
ALBUMIN UR-MCNC: <1.2 MG/DL
ALBUMIN/GLOB SERPL: 1.2 G/DL
ALP SERPL-CCNC: 48 U/L (ref 39–117)
ALT SERPL W P-5'-P-CCNC: 11 U/L (ref 1–33)
ANION GAP SERPL CALCULATED.3IONS-SCNC: 10.2 MMOL/L (ref 5–15)
AST SERPL-CCNC: 16 U/L (ref 1–32)
BASOPHILS # BLD AUTO: 0.03 10*3/MM3 (ref 0–0.2)
BASOPHILS NFR BLD AUTO: 0.4 % (ref 0–1.5)
BILIRUB SERPL-MCNC: 0.7 MG/DL (ref 0–1.2)
BUN SERPL-MCNC: 4 MG/DL (ref 6–20)
BUN/CREAT SERPL: 4.5 (ref 7–25)
CALCIUM SPEC-SCNC: 10 MG/DL (ref 8.6–10.5)
CHLORIDE SERPL-SCNC: 106 MMOL/L (ref 98–107)
CHOLEST SERPL-MCNC: 193 MG/DL (ref 0–200)
CO2 SERPL-SCNC: 25.8 MMOL/L (ref 22–29)
CREAT SERPL-MCNC: 0.89 MG/DL (ref 0.57–1)
CREAT UR-MCNC: 99.2 MG/DL
DEPRECATED RDW RBC AUTO: 42.2 FL (ref 37–54)
EGFRCR SERPLBLD CKD-EPI 2021: 82.6 ML/MIN/1.73
EOSINOPHIL # BLD AUTO: 0.2 10*3/MM3 (ref 0–0.4)
EOSINOPHIL NFR BLD AUTO: 2.7 % (ref 0.3–6.2)
ERYTHROCYTE [DISTWIDTH] IN BLOOD BY AUTOMATED COUNT: 13.6 % (ref 12.3–15.4)
EXPIRATION DATE: ABNORMAL
FERRITIN SERPL-MCNC: 81.1 NG/ML (ref 13–150)
GLOBULIN UR ELPH-MCNC: 3.4 GM/DL
GLUCOSE SERPL-MCNC: 73 MG/DL (ref 65–99)
HBA1C MFR BLD: 5.8 % (ref 4.5–5.7)
HCT VFR BLD AUTO: 44.4 % (ref 34–46.6)
HDLC SERPL-MCNC: 38 MG/DL (ref 40–60)
HGB BLD-MCNC: 15.1 G/DL (ref 12–15.9)
IMM GRANULOCYTES # BLD AUTO: 0.02 10*3/MM3 (ref 0–0.05)
IMM GRANULOCYTES NFR BLD AUTO: 0.3 % (ref 0–0.5)
LDLC SERPL CALC-MCNC: 122 MG/DL (ref 0–100)
LDLC/HDLC SERPL: 3.12 {RATIO}
LYMPHOCYTES # BLD AUTO: 2.94 10*3/MM3 (ref 0.7–3.1)
LYMPHOCYTES NFR BLD AUTO: 39.6 % (ref 19.6–45.3)
Lab: ABNORMAL
MCH RBC QN AUTO: 29.8 PG (ref 26.6–33)
MCHC RBC AUTO-ENTMCNC: 34 G/DL (ref 31.5–35.7)
MCV RBC AUTO: 87.6 FL (ref 79–97)
MICROALBUMIN/CREAT UR: NORMAL MG/G{CREAT}
MONOCYTES # BLD AUTO: 0.64 10*3/MM3 (ref 0.1–0.9)
MONOCYTES NFR BLD AUTO: 8.6 % (ref 5–12)
NEUTROPHILS NFR BLD AUTO: 3.59 10*3/MM3 (ref 1.7–7)
NEUTROPHILS NFR BLD AUTO: 48.4 % (ref 42.7–76)
NRBC BLD AUTO-RTO: 0 /100 WBC (ref 0–0.2)
PLATELET # BLD AUTO: 274 10*3/MM3 (ref 140–450)
PMV BLD AUTO: 9.4 FL (ref 6–12)
POTASSIUM SERPL-SCNC: 3.8 MMOL/L (ref 3.5–5.2)
PROT SERPL-MCNC: 7.4 G/DL (ref 6–8.5)
RBC # BLD AUTO: 5.07 10*6/MM3 (ref 3.77–5.28)
SODIUM SERPL-SCNC: 142 MMOL/L (ref 136–145)
T4 SERPL-MCNC: 7.61 MCG/DL (ref 4.5–11.7)
TRIGL SERPL-MCNC: 183 MG/DL (ref 0–150)
TSH SERPL DL<=0.05 MIU/L-ACNC: 0.99 UIU/ML (ref 0.27–4.2)
VIT B12 BLD-MCNC: <150 PG/ML (ref 211–946)
VLDLC SERPL-MCNC: 33 MG/DL (ref 5–40)
WBC NRBC COR # BLD AUTO: 7.42 10*3/MM3 (ref 3.4–10.8)

## 2025-08-18 PROCEDURE — 80061 LIPID PANEL: CPT | Performed by: STUDENT IN AN ORGANIZED HEALTH CARE EDUCATION/TRAINING PROGRAM

## 2025-08-18 PROCEDURE — 82570 ASSAY OF URINE CREATININE: CPT | Performed by: STUDENT IN AN ORGANIZED HEALTH CARE EDUCATION/TRAINING PROGRAM

## 2025-08-18 PROCEDURE — 84443 ASSAY THYROID STIM HORMONE: CPT | Performed by: STUDENT IN AN ORGANIZED HEALTH CARE EDUCATION/TRAINING PROGRAM

## 2025-08-18 PROCEDURE — 82728 ASSAY OF FERRITIN: CPT | Performed by: STUDENT IN AN ORGANIZED HEALTH CARE EDUCATION/TRAINING PROGRAM

## 2025-08-18 PROCEDURE — 82172 ASSAY OF APOLIPOPROTEIN: CPT | Performed by: STUDENT IN AN ORGANIZED HEALTH CARE EDUCATION/TRAINING PROGRAM

## 2025-08-18 PROCEDURE — 82306 VITAMIN D 25 HYDROXY: CPT | Performed by: STUDENT IN AN ORGANIZED HEALTH CARE EDUCATION/TRAINING PROGRAM

## 2025-08-18 PROCEDURE — 85025 COMPLETE CBC W/AUTO DIFF WBC: CPT | Performed by: STUDENT IN AN ORGANIZED HEALTH CARE EDUCATION/TRAINING PROGRAM

## 2025-08-18 PROCEDURE — 84436 ASSAY OF TOTAL THYROXINE: CPT | Performed by: STUDENT IN AN ORGANIZED HEALTH CARE EDUCATION/TRAINING PROGRAM

## 2025-08-18 PROCEDURE — 82043 UR ALBUMIN QUANTITATIVE: CPT | Performed by: STUDENT IN AN ORGANIZED HEALTH CARE EDUCATION/TRAINING PROGRAM

## 2025-08-18 PROCEDURE — 83695 ASSAY OF LIPOPROTEIN(A): CPT | Performed by: STUDENT IN AN ORGANIZED HEALTH CARE EDUCATION/TRAINING PROGRAM

## 2025-08-18 PROCEDURE — 80053 COMPREHEN METABOLIC PANEL: CPT | Performed by: STUDENT IN AN ORGANIZED HEALTH CARE EDUCATION/TRAINING PROGRAM

## 2025-08-18 PROCEDURE — 82607 VITAMIN B-12: CPT | Performed by: STUDENT IN AN ORGANIZED HEALTH CARE EDUCATION/TRAINING PROGRAM

## 2025-08-18 RX ORDER — OMEPRAZOLE 20 MG/1
20 CAPSULE, DELAYED RELEASE ORAL DAILY
Qty: 90 CAPSULE | Refills: 1 | Status: SHIPPED | OUTPATIENT
Start: 2025-08-18

## 2025-08-18 RX ORDER — MELOXICAM 15 MG/1
15 TABLET ORAL DAILY
Qty: 14 TABLET | Refills: 0 | Status: SHIPPED | OUTPATIENT
Start: 2025-08-18

## 2025-08-18 RX ORDER — ROSUVASTATIN CALCIUM 10 MG/1
10 TABLET, COATED ORAL NIGHTLY
Qty: 90 TABLET | Refills: 3 | Status: SHIPPED | OUTPATIENT
Start: 2025-08-18

## 2025-08-18 RX ORDER — HYDROXYZINE PAMOATE 25 MG/1
25 CAPSULE ORAL
Qty: 30 CAPSULE | Refills: 1 | Status: SHIPPED | OUTPATIENT
Start: 2025-08-18 | End: 2025-08-20

## 2025-08-18 RX ORDER — PROPRANOLOL HCL 20 MG
20 TABLET ORAL 2 TIMES DAILY
Qty: 60 TABLET | Refills: 1 | Status: SHIPPED | OUTPATIENT
Start: 2025-08-18 | End: 2025-08-20

## 2025-08-19 ENCOUNTER — TELEPHONE (OUTPATIENT)
Dept: INTERNAL MEDICINE | Facility: CLINIC | Age: 45
End: 2025-08-19
Payer: COMMERCIAL

## 2025-08-19 DIAGNOSIS — F41.1 GENERALIZED ANXIETY DISORDER: Primary | ICD-10-CM

## 2025-08-19 LAB — LPA SERPL-SCNC: 140.4 NMOL/L

## 2025-08-19 RX ORDER — LANOLIN ALCOHOL/MO/W.PET/CERES
1000 CREAM (GRAM) TOPICAL DAILY
Qty: 90 TABLET | Refills: 1 | Status: SHIPPED | OUTPATIENT
Start: 2025-08-19

## 2025-08-19 RX ORDER — ERGOCALCIFEROL 1.25 MG/1
50000 CAPSULE, LIQUID FILLED ORAL WEEKLY
Qty: 6 CAPSULE | Refills: 0 | Status: SHIPPED | OUTPATIENT
Start: 2025-08-19

## 2025-08-20 PROBLEM — F41.1 GENERALIZED ANXIETY DISORDER: Status: ACTIVE | Noted: 2025-08-20

## 2025-08-20 RX ORDER — BUSPIRONE HYDROCHLORIDE 5 MG/1
5 TABLET ORAL 3 TIMES DAILY
Qty: 90 TABLET | Refills: 1 | Status: SHIPPED | OUTPATIENT
Start: 2025-08-20

## 2025-08-23 LAB — APO B SERPL-MCNC: 115 MG/DL

## (undated) DEVICE — COVER,LIGHT HANDLE,FLX,1/PK: Brand: MEDLINE INDUSTRIES, INC.

## (undated) DEVICE — ENDOPATH XCEL UNIVERSAL TROCAR STABLILITY SLEEVES: Brand: ENDOPATH XCEL

## (undated) DEVICE — CONTN GRAD MEAS TRIANG 32OZ BLK

## (undated) DEVICE — MEDI-VAC NON-CONDUCTIVE SUCTION TUBING: Brand: CARDINAL HEALTH

## (undated) DEVICE — FLTR PLUMEPORT LAP W/CONN STRL

## (undated) DEVICE — "MH-443 SUCTION VALVE F/EVIS140 EVIS160": Brand: SUCTION VALVE

## (undated) DEVICE — ST EXT MICROBORE FIX M LL 38IN

## (undated) DEVICE — SUT MONOCRYL PLS ANTIB UND 3/0  PS1 27IN

## (undated) DEVICE — POWER SHELL: Brand: SIGNIA

## (undated) DEVICE — DUAL LUMEN STOMACH TUBE,ANTI-REFLUX VALVE: Brand: SALEM SUMP

## (undated) DEVICE — GLV SURG SENSICARE MICRO PF LF 9 STRL

## (undated) DEVICE — DRN PENRS 1/2X18IN LTX

## (undated) DEVICE — PK BARIATRIC 10

## (undated) DEVICE — CANN NASL CO2 DIVIDED A/

## (undated) DEVICE — GLV SURG DERMASSURE GRN LF PF 7.0

## (undated) DEVICE — SINGLE-USE BIOPSY FORCEPS: Brand: RADIAL JAW 4

## (undated) DEVICE — SYR LUERLOK 50ML

## (undated) DEVICE — Device: Brand: ENDOGATOR

## (undated) DEVICE — TBG 02 CRUSH RESIST LF CLR 7FT

## (undated) DEVICE — MEDI-VAC YANKAUER SUCTION HANDLE W/BULBOUS TIP: Brand: CARDINAL HEALTH

## (undated) DEVICE — GLV SURG SENSICARE MICRO PF LF 7.5 STRL

## (undated) DEVICE — INTRO ACCSR BLNT TP

## (undated) DEVICE — ST NERV BLCK CONT CONTIPLEX ECHO CLSD 18G 4IN

## (undated) DEVICE — AIRWY SZ11

## (undated) DEVICE — SYS CLS PORTSITE CT CLOSESURE 5AND10/12

## (undated) DEVICE — [HIGH FLOW INSUFFLATOR,  DO NOT USE IF PACKAGE IS DAMAGED,  KEEP DRY,  KEEP AWAY FROM SUNLIGHT,  PROTECT FROM HEAT AND RADIOACTIVE SOURCES.]: Brand: PNEUMOSURE

## (undated) DEVICE — MARYLAND JAW LAPAROSCOPIC SEALER/DIVIDER COATED: Brand: LIGASURE

## (undated) DEVICE — ENDOPATH XCEL BLADELESS TROCARS WITH STABILITY SLEEVES: Brand: ENDOPATH XCEL

## (undated) DEVICE — TROCAR: Brand: KII FIOS FIRST ENTRY

## (undated) DEVICE — GLV SURG SENSICARE MICRO PF LF 8.5 STRL

## (undated) DEVICE — CANNULA,OXY,ADULT,SUPERSOFT,W/7'TUB,UC: Brand: MEDLINE

## (undated) DEVICE — "MH-438 A/W VLVE F/140 EVIS-140": Brand: AIR/WATER VALVE

## (undated) DEVICE — APPL CHLORAPREP W/TINT 26ML BLU

## (undated) DEVICE — ENCORE® LATEX MICRO SIZE 7, STERILE LATEX POWDER-FREE SURGICAL GLOVE: Brand: ENCORE

## (undated) DEVICE — "MH-948 A/W CHANNEL CLEANING ADPTR -VIDEO": Brand: AW CHANNEL CLEANING ADAPTE

## (undated) DEVICE — TISSUE RETRIEVAL SYSTEM: Brand: INZII RETRIEVAL SYSTEM

## (undated) DEVICE — ANTIBACTERIAL VIOLET BRAIDED (POLYGLACTIN 910), SYNTHETIC ABSORBABLE SUTURE: Brand: COATED VICRYL

## (undated) DEVICE — THE BITE BLOCK MAXI, LATEX FREE STRAP IS USED TO PROTECT THE ENDOSCOPE INSERTION TUBE FROM BEING BITTEN BY THE PATIENT.

## (undated) DEVICE — ENDOGATOR HYBRID TUBING KIT FOR USE WITH ENDOGATOR IRRIGATION PUMP, OLYMPUS PUMP, GI4000 ESU, AND TORRENT IRRIGATION PUMP.: Brand: ENDOGATOR KIT

## (undated) DEVICE — GLV SURG SENSICARE MICRO PF LF 6.5 STRL